# Patient Record
Sex: MALE | Race: WHITE | Employment: FULL TIME | URBAN - METROPOLITAN AREA
[De-identification: names, ages, dates, MRNs, and addresses within clinical notes are randomized per-mention and may not be internally consistent; named-entity substitution may affect disease eponyms.]

---

## 2018-12-17 ENCOUNTER — OFFICE VISIT (OUTPATIENT)
Dept: FAMILY MEDICINE | Facility: CLINIC | Age: 28
End: 2018-12-17
Payer: COMMERCIAL

## 2018-12-17 VITALS
OXYGEN SATURATION: 96 % | WEIGHT: 283 LBS | DIASTOLIC BLOOD PRESSURE: 88 MMHG | HEART RATE: 65 BPM | RESPIRATION RATE: 16 BRPM | BODY MASS INDEX: 34.46 KG/M2 | TEMPERATURE: 97.6 F | HEIGHT: 76 IN | SYSTOLIC BLOOD PRESSURE: 158 MMHG

## 2018-12-17 DIAGNOSIS — J40 COMPLICATED BRONCHITIS: Primary | ICD-10-CM

## 2018-12-17 PROCEDURE — 99204 OFFICE O/P NEW MOD 45 MIN: CPT | Performed by: PHYSICIAN ASSISTANT

## 2018-12-17 RX ORDER — AZITHROMYCIN 250 MG/1
TABLET, FILM COATED ORAL
Qty: 6 TABLET | Refills: 0 | Status: SHIPPED | OUTPATIENT
Start: 2018-12-17 | End: 2020-01-13

## 2018-12-17 RX ORDER — PREDNISONE 20 MG/1
20 TABLET ORAL DAILY
Qty: 5 TABLET | Refills: 0 | Status: SHIPPED | OUTPATIENT
Start: 2018-12-17 | End: 2018-12-22

## 2018-12-17 RX ORDER — ALBUTEROL SULFATE 90 UG/1
AEROSOL, METERED RESPIRATORY (INHALATION)
Qty: 1 INHALER | Refills: 0 | Status: SHIPPED | OUTPATIENT
Start: 2018-12-17 | End: 2020-01-13

## 2018-12-17 ASSESSMENT — ENCOUNTER SYMPTOMS
VOMITING: 0
HEADACHES: 0
COUGH: 1
NAUSEA: 0
SORE THROAT: 0
PALPITATIONS: 0
ABDOMINAL PAIN: 0
MYALGIAS: 0
SHORTNESS OF BREATH: 0
EYE DISCHARGE: 0
PSYCHIATRIC NEGATIVE: 1
NEUROLOGICAL NEGATIVE: 1
WHEEZING: 0
DIARRHEA: 0
CHILLS: 0
EYE REDNESS: 0
BLURRED VISION: 0
FEVER: 0

## 2018-12-17 ASSESSMENT — MIFFLIN-ST. JEOR: SCORE: 2360.18

## 2018-12-17 NOTE — PROGRESS NOTES
SUBJECTIVE:   Rogelio Noel is a 28 year old male who presents to clinic today for the following health issues:      Cough       Duration: 1 month     Description (location/character/radiation): Cough     Intensity:  Mild to Moderate     Accompanying signs and symptoms: for 3 weeks the cough was worse at night, no runny nose or congestion, the cough felt deep like it was coming from his chest, no sob or wheezing.     History (similar episodes/previous evaluation): None    Precipitating or alleviating factors: None    Therapies tried and outcome: None     Cough worse in the past week. Dad was recently diagnosed with walking pneumonia and he lives in the same house.dad was treated with antibiotic and inhaler. No history of asthma or reactive airways.     Patient is generally healthy with no significant past medical history, surgical history, or family history.      Problem list and histories reviewed & adjusted, as indicated.  Additional history: as documented    There is no problem list on file for this patient.    History reviewed. No pertinent surgical history.    Social History     Tobacco Use     Smoking status: Never Smoker     Smokeless tobacco: Never Used   Substance Use Topics     Alcohol use: Yes     Comment: Beer once or twice a month      Family History   Problem Relation Age of Onset     Cancer Mother      Diabetes Mother      Hypertension Mother      Diabetes Father      Hypertension Father      Hypertension Brother          Current Outpatient Medications   Medication Sig Dispense Refill     albuterol (PROAIR HFA/PROVENTIL HFA/VENTOLIN HFA) 108 (90 Base) MCG/ACT inhaler Inhale 2 puffs every 4-6 hours as needed for cough, wheezing, or shortness of breath 1 Inhaler 0     azithromycin (ZITHROMAX) 250 MG tablet Two tablets first day, then one tablet daily for four days. 6 tablet 0     predniSONE (DELTASONE) 20 MG tablet Take 20 mg by mouth daily for 5 days. 5 tablet 0     No Known Allergies  Labs reviewed  "in EPIC    Reviewed and updated as needed this visit by clinical staff  Tobacco  Allergies  Meds  Problems  Med Hx  Surg Hx  Fam Hx  Soc Hx        Reviewed and updated as needed this visit by Provider  Tobacco  Allergies  Meds  Problems  Med Hx  Surg Hx  Fam Hx         ROS:  Review of Systems   Constitutional: Negative for chills, fever and malaise/fatigue.   HENT: Negative for congestion, ear pain and sore throat.    Eyes: Negative for blurred vision, discharge and redness.   Respiratory: Positive for cough. Negative for shortness of breath and wheezing.    Cardiovascular: Negative for chest pain and palpitations.   Gastrointestinal: Negative for abdominal pain, diarrhea, nausea and vomiting.   Genitourinary: Negative.    Musculoskeletal: Negative for joint pain and myalgias.   Skin: Negative for rash.   Neurological: Negative.  Negative for headaches.   Endo/Heme/Allergies: Negative.    Psychiatric/Behavioral: Negative.          OBJECTIVE:     /88   Pulse 65   Temp 97.6  F (36.4  C) (Tympanic)   Resp 16   Ht 1.93 m (6' 4\")   Wt 128.4 kg (283 lb)   SpO2 96%   BMI 34.45 kg/m    Body mass index is 34.45 kg/m .    Physical Exam   Constitutional: He appears well-developed and well-nourished. No distress.   HENT:   Head: Normocephalic and atraumatic.   Right Ear: Tympanic membrane and ear canal normal.   Left Ear: Tympanic membrane and ear canal normal.   Mouth/Throat: Oropharynx is clear and moist.   Eyes: Conjunctivae are normal. Pupils are equal, round, and reactive to light.   Neck: Neck supple.   Cardiovascular: Normal rate and regular rhythm.   Pulmonary/Chest: Effort normal and breath sounds normal.   Frequent dry reactive cough   Skin: Skin is warm and dry. No rash noted.   Psychiatric: He has a normal mood and affect. His behavior is normal.       Diagnostic Test Results:  none     ASSESSMENT/PLAN:     1. Complicated bronchitis  Given worsening cough and exposure to walking pneumonia, " will treat with azithromycin 500mg once daily x 1 day, then 250mg once daily x 4 days. Also prescribed prednisone 20mg once daily x 5 days and albuterol 2 puffs every 4-6hrs as needed. Discussed how to take/use these medications and what to expect. Get plenty of rest and push fluids. Can use Tylenol and/or ibuprofen as needed for pain and/or fever control. Return to clinic if symptoms worsen or do not improve; otherwise follow up as needed      - azithromycin (ZITHROMAX) 250 MG tablet; Two tablets first day, then one tablet daily for four days.  Dispense: 6 tablet; Refill: 0  - albuterol (PROAIR HFA/PROVENTIL HFA/VENTOLIN HFA) 108 (90 Base) MCG/ACT inhaler; Inhale 2 puffs every 4-6 hours as needed for cough, wheezing, or shortness of breath  Dispense: 1 Inhaler; Refill: 0  - predniSONE (DELTASONE) 20 MG tablet; Take 20 mg by mouth daily for 5 days.  Dispense: 5 tablet; Refill: 0     Kiersten Chavez PA-C  [facility]

## 2018-12-17 NOTE — LETTER
[facility]  [address] [place] [address]  Phone: [phone]  Fax: [phone]    December 17, 2018        Rogelio Noel  [address] [place] [address]          To whom it may concern:    RE: Rogelio Noel    Patient was seen and treated today at our clinic and missed work 12/18/18 and 12/19/18    Please contact me for questions or concerns.      Sincerely,        Kiersten Chavez PA-C

## 2019-01-14 ENCOUNTER — OFFICE VISIT (OUTPATIENT)
Dept: FAMILY MEDICINE | Facility: CLINIC | Age: 29
End: 2019-01-14
Payer: COMMERCIAL

## 2019-01-14 ENCOUNTER — ANCILLARY PROCEDURE (OUTPATIENT)
Dept: GENERAL RADIOLOGY | Facility: CLINIC | Age: 29
End: 2019-01-14
Payer: COMMERCIAL

## 2019-01-14 VITALS
SYSTOLIC BLOOD PRESSURE: 152 MMHG | RESPIRATION RATE: 14 BRPM | DIASTOLIC BLOOD PRESSURE: 80 MMHG | HEART RATE: 79 BPM | OXYGEN SATURATION: 98 % | HEIGHT: 75 IN | BODY MASS INDEX: 34.74 KG/M2 | TEMPERATURE: 96.9 F | WEIGHT: 279.4 LBS

## 2019-01-14 DIAGNOSIS — I10 BENIGN ESSENTIAL HYPERTENSION: ICD-10-CM

## 2019-01-14 DIAGNOSIS — R05.9 COUGH: ICD-10-CM

## 2019-01-14 DIAGNOSIS — R05.9 COUGH: Primary | ICD-10-CM

## 2019-01-14 PROCEDURE — 71046 X-RAY EXAM CHEST 2 VIEWS: CPT

## 2019-01-14 PROCEDURE — 99214 OFFICE O/P EST MOD 30 MIN: CPT | Performed by: PHYSICIAN ASSISTANT

## 2019-01-14 RX ORDER — ALBUTEROL SULFATE 90 UG/1
AEROSOL, METERED RESPIRATORY (INHALATION)
Qty: 1 INHALER | Refills: 0 | Status: SHIPPED | OUTPATIENT
Start: 2019-01-14 | End: 2020-01-13

## 2019-01-14 RX ORDER — PREDNISONE 20 MG/1
20 TABLET ORAL DAILY
Qty: 7 TABLET | Refills: 0 | Status: SHIPPED | OUTPATIENT
Start: 2019-01-14 | End: 2019-01-21

## 2019-01-14 ASSESSMENT — ENCOUNTER SYMPTOMS
EYE REDNESS: 0
VOMITING: 0
MYALGIAS: 0
EYE DISCHARGE: 0
DIARRHEA: 0
SORE THROAT: 0
HEADACHES: 0
BLURRED VISION: 0
SHORTNESS OF BREATH: 0
CHILLS: 0
NAUSEA: 0
PALPITATIONS: 0
FEVER: 0
ABDOMINAL PAIN: 0

## 2019-01-14 ASSESSMENT — MIFFLIN-ST. JEOR: SCORE: 2320.04

## 2019-01-14 NOTE — LETTER
[facility]  [address] [place] [address]  Phone: [phone]  Fax: [phone]    January 14, 2019        Rogelio Noel  [address] [place] [address]          To whom it may concern:    RE: Rogelio Noel    Patient was seen and treated today at our clinic and missed work 01/14/19    Please contact me for questions or concerns.      Sincerely,        LORA SAME DAY PROVIDER

## 2019-01-14 NOTE — PROGRESS NOTES
SUBJECTIVE:   Rogelio Noel is a 28 year old male who presents to clinic today for the following health issues:      Cough       Duration: over a month- started again about 1.5 weeks ago     Description (location/character/radiation): cough     Intensity:  severe    Accompanying signs and symptoms: shortness of breath, struggling to breathe, runny nose, congestion, sore throat, cough is productive with yellow sputum, loss of sleep, fatigue, loss of energy, felt like throat was closing last night.      History (similar episodes/previous evaluation): exposed to father with illness     Precipitating or alleviating factors: None    Therapies tried and outcome: zpak- taken 12/17/18 and it helped but sx's are getting worse again, prednisone, did not take the inhaler. The prednisone and zpak did help the first time.      Patient reports history of hypertension. He was started on blood pressure medication for several years and then came off of the medication. He does not check blood pressure at home.    Problem list and histories reviewed & adjusted, as indicated.  Additional history: as documented    There is no problem list on file for this patient.    History reviewed. No pertinent surgical history.    Social History     Tobacco Use     Smoking status: Never Smoker     Smokeless tobacco: Never Used   Substance Use Topics     Alcohol use: Yes     Comment: Beer once or twice a month      Family History   Problem Relation Age of Onset     Cancer Mother      Diabetes Mother      Hypertension Mother      Diabetes Father      Hypertension Father      Hypertension Brother          Current Outpatient Medications   Medication Sig Dispense Refill     albuterol (PROAIR HFA/PROVENTIL HFA/VENTOLIN HFA) 108 (90 Base) MCG/ACT inhaler Inhale 2 puffs every 4-6 hours as needed for cough, wheezing, or shortness of breath 1 Inhaler 0     predniSONE (DELTASONE) 20 MG tablet Take 20 mg by mouth daily for 7 days. 7 tablet 0     albuterol  "(PROAIR HFA/PROVENTIL HFA/VENTOLIN HFA) 108 (90 Base) MCG/ACT inhaler Inhale 2 puffs every 4-6 hours as needed for cough, wheezing, or shortness of breath (Patient not taking: Reported on 1/14/2019) 1 Inhaler 0     azithromycin (ZITHROMAX) 250 MG tablet Two tablets first day, then one tablet daily for four days. (Patient not taking: Reported on 1/14/2019) 6 tablet 0     No Known Allergies  Labs reviewed in EPIC    Reviewed and updated as needed this visit by clinical staff  Tobacco  Allergies  Meds  Problems  Med Hx  Surg Hx  Fam Hx  Soc Hx        Reviewed and updated as needed this visit by Provider  Tobacco  Allergies  Meds  Problems  Med Hx  Surg Hx  Fam Hx         ROS:  Review of Systems   Constitutional: Negative for chills, fever and malaise/fatigue.   HENT: Negative for congestion, ear pain and sore throat.    Eyes: Negative for blurred vision, discharge and redness.   Respiratory: Positive for cough and wheezing. Negative for shortness of breath.    Cardiovascular: Negative for chest pain and palpitations.   Gastrointestinal: Negative for abdominal pain, diarrhea, nausea and vomiting.   Musculoskeletal: Negative for joint pain and myalgias.   Skin: Negative for rash.   Neurological: Negative for headaches.         OBJECTIVE:     /80   Pulse 79   Temp 96.9  F (36.1  C) (Tympanic)   Resp 14   Ht 1.892 m (6' 2.5\")   Wt 126.7 kg (279 lb 6.4 oz)   SpO2 98%   BMI 35.39 kg/m    Body mass index is 35.39 kg/m .    Physical Exam   Constitutional: He appears well-developed and well-nourished. No distress.   HENT:   Head: Normocephalic and atraumatic.   Right Ear: Tympanic membrane and ear canal normal.   Left Ear: Tympanic membrane and ear canal normal.   Mouth/Throat: Oropharynx is clear and moist.   Eyes: Conjunctivae are normal. Pupils are equal, round, and reactive to light.   Cardiovascular: Normal rate and regular rhythm.   Pulmonary/Chest: Effort normal and breath sounds normal. "   Skin: Skin is warm and dry. No rash noted.   Psychiatric: He has a normal mood and affect. His behavior is normal.       Diagnostic Test Results:  CXR - unremarkable    ASSESSMENT/PLAN:       1. Cough  Reassurance, chest x-ray is clear. Likely postviral/reactive cough. Will try another course of prednisone 20mg x 7 days and albuterol 2 puffs every 4-6 hrs as needed. Return to clinic if symptoms worsen or do not improve; otherwise follow up as needed      - XR Chest 2 Views; Future  - predniSONE (DELTASONE) 20 MG tablet; Take 20 mg by mouth daily for 7 days.  Dispense: 7 tablet; Refill: 0  - albuterol (PROAIR HFA/PROVENTIL HFA/VENTOLIN HFA) 108 (90 Base) MCG/ACT inhaler; Inhale 2 puffs every 4-6 hours as needed for cough, wheezing, or shortness of breath  Dispense: 1 Inhaler; Refill: 0    2. Benign essential hypertension  Would recommend he log blood pressure once daily at home and follow up with primary care provider in 2 weeks with readings. Aim for <2,000mg of sodium/day. He agrees with plan.    EMMY العراقي SAME DAY PROVIDER  [facility]

## 2019-01-16 ASSESSMENT — ENCOUNTER SYMPTOMS
WHEEZING: 1
COUGH: 1

## 2020-01-13 ENCOUNTER — OFFICE VISIT (OUTPATIENT)
Dept: FAMILY MEDICINE | Facility: CLINIC | Age: 30
End: 2020-01-13
Payer: COMMERCIAL

## 2020-01-13 VITALS
OXYGEN SATURATION: 97 % | TEMPERATURE: 96.6 F | HEART RATE: 81 BPM | BODY MASS INDEX: 33.51 KG/M2 | SYSTOLIC BLOOD PRESSURE: 162 MMHG | HEIGHT: 76 IN | WEIGHT: 275.2 LBS | DIASTOLIC BLOOD PRESSURE: 98 MMHG

## 2020-01-13 DIAGNOSIS — I10 BENIGN ESSENTIAL HYPERTENSION: ICD-10-CM

## 2020-01-13 DIAGNOSIS — Z23 NEED FOR PROPHYLACTIC VACCINATION AND INOCULATION AGAINST INFLUENZA: ICD-10-CM

## 2020-01-13 DIAGNOSIS — J20.9 ACUTE BRONCHITIS WITH SYMPTOMS > 10 DAYS: Primary | ICD-10-CM

## 2020-01-13 PROCEDURE — 90471 IMMUNIZATION ADMIN: CPT | Performed by: NURSE PRACTITIONER

## 2020-01-13 PROCEDURE — 99214 OFFICE O/P EST MOD 30 MIN: CPT | Mod: 25 | Performed by: NURSE PRACTITIONER

## 2020-01-13 PROCEDURE — 90686 IIV4 VACC NO PRSV 0.5 ML IM: CPT | Performed by: NURSE PRACTITIONER

## 2020-01-13 RX ORDER — AZITHROMYCIN 250 MG/1
TABLET, FILM COATED ORAL
Qty: 6 TABLET | Refills: 0 | Status: SHIPPED | OUTPATIENT
Start: 2020-01-13 | End: 2020-02-11

## 2020-01-13 ASSESSMENT — MIFFLIN-ST. JEOR: SCORE: 2314.55

## 2020-01-13 NOTE — PROGRESS NOTES
"Subjective     Rogelio Noel is a 29 year old male who presents to clinic today for the following health issues:    HPI   ENT Symptoms             Symptoms: cc Present Absent Comment   Fever/Chills  X   One week ago   Fatigue   X     Muscle Aches   X     Eye Irritation   X     Sneezing   X     Nasal Blaze/Drg  X      Sinus Pressure/Pain   X     Loss of smell   X     Dental pain   X     Sore Throat  X      Swollen Glands   X     Ear Pain/Fullness   X     Cough X    Dry    Wheeze   X     Chest Pain   X     Shortness of breath   X     Rash   X     Other         Symptom duration:  1.5 weeks   Symptom severity:  cough is severe; worse when laying flat and after exercise.    Treatments tried:  cough drops, nyquil, dayquil, ice cream   Contacts:  exposed to walking pneumonia, non-smoker, no history of asthma.     Initially had fever 99     BP Readings from Last 6 Encounters:   01/13/20 (!) 162/98   01/14/19 152/80   12/17/18 158/88               There is no problem list on file for this patient.    No past surgical history on file.    Social History     Tobacco Use     Smoking status: Never Smoker     Smokeless tobacco: Never Used   Substance Use Topics     Alcohol use: Yes     Comment: Beer once or twice a month      Family History   Problem Relation Age of Onset     Cancer Mother      Diabetes Mother      Hypertension Mother      Diabetes Father      Hypertension Father      Hypertension Brother            Reviewed and updated as needed this visit by Provider         Review of Systems   ROS COMP: Constitutional, HEENT, cardiovascular, pulmonary, gi and gu systems are negative, except as otherwise noted.      Objective    BP (!) 162/98 (BP Location: Right arm, Patient Position: Sitting, Cuff Size: Adult Large)   Pulse 81   Temp 96.6  F (35.9  C) (Tympanic)   Ht 1.922 m (6' 3.67\")   Wt 124.8 kg (275 lb 3.2 oz)   SpO2 97%   BMI 33.79 kg/m    Body mass index is 33.79 kg/m .  Physical Exam   GENERAL: healthy, alert and " "no distress  NECK: no adenopathy, no asymmetry, masses, or scars and thyroid normal to palpation  RESP: lungs clear to auscultation - no rales, rhonchi or wheezes  CV: regular rate and rhythm, normal S1 S2, no S3 or S4, no murmur, click or rub, no peripheral edema and peripheral pulses strong  ABDOMEN: soft, nontender, no hepatosplenomegaly, no masses and bowel sounds normal  MS: no gross musculoskeletal defects noted, no edema    Diagnostic Test Results:  Labs reviewed in Epic  none         Assessment & Plan     1. Acute bronchitis with symptoms > 10 days  Not hypoxic   Exam findinh unremarkable   Will treat with a z pack   Advised to call if not improved would get CXR give hx of pneumonia on Friday and consider prednisone     - azithromycin (ZITHROMAX) 250 MG tablet; Two tablets first day, then one tablet daily for four days.  Dispense: 6 tablet; Refill: 0    2. Benign essential hypertension  High today but ill   Discussed the past 3 readings have been high   He has been scheduled for a follow up appointment with me to recheck in 2 weeks and will likely start medication then   He lives a active lifestyle, yoga daily, skates in a men's league twice a week   Recommend low sodium diet   Reports significant family history     3. Need for prophylactic vaccination and inoculation against influenza  - INFLUENZA VACCINE IM > 6 MONTHS VALENT IIV4 [39539]     BMI:   Estimated body mass index is 33.79 kg/m  as calculated from the following:    Height as of this encounter: 1.922 m (6' 3.67\").    Weight as of this encounter: 124.8 kg (275 lb 3.2 oz).   Weight management plan: Discussed healthy diet and exercise guidelines        Patient Instructions     Will treat with a Z Pack for bronchitis   If not improved on Friday call and will order a chest xray (may consider steroid)       Patient Education     Acute Bronchitis  Your healthcare provider has told you that you have acute bronchitis. Bronchitis is infection or " inflammation of the bronchial tubes (airways in the lungs). Normally, air moves easily in and out of the airways. Bronchitis narrows the airways, making it harder for air to flow in and out of the lungs. This causes symptoms such as shortness of breath, coughing up yellow or green mucus, and wheezing. Bronchitis can be acute or chronic. Acute means the condition comes on quickly and goes away in a short time, usually within 3 to 10 days. Chronic means a condition lasts a long time and often comes back.    What causes acute bronchitis?  Acute bronchitis almost always starts as a viral respiratory infection, such as a cold or the flu. Certain factors make it more likely for a cold or flu to turn into bronchitis. These include being very young, being elderly, having a heart or lung problem, or having a weak immune system. Cigarette smoking also makes bronchitis more likely.  When bronchitis develops, the airways become swollen. The airways may also become infected with bacteria. This is known as a secondary infection.  Diagnosing acute bronchitis  Your healthcare provider will examine you and ask about your symptoms and health history. You may also have a sputum culture to test the fluid in your lungs. Chest X-rays may be done to look for infection in the lungs.  Treating acute bronchitis  Bronchitis usually clears up as the cold or flu goes away. You can help feel better faster by doing the following:    Take medicine as directed. You may be told to take ibuprofen or other over-the-counter medicines. These help relieve inflammation in your bronchial tubes. Your healthcare provider may prescribe an inhaler to help open up the bronchial tubes. Most of the time, acute bronchitis is caused by a viral infection. Antibiotics are usually not prescribed for viral infections.    Drink plenty of fluids, such as water, juice, or warm soup. Fluids loosen mucus so that you can cough it up. This helps you breathe more easily.  Fluids also prevent dehydration.    Make sure you get plenty of rest.    Do not smoke. Do not allow anyone else to smoke in your home.  Recovery and follow-up  Follow up with your doctor as you are told. You will likely feel better in a week or two. But a dry cough can linger beyond that time. Let your doctor know if you still have symptoms (other than a dry cough) after 2 weeks, or if you re prone to getting bronchial infections. Take steps to protect yourself from future infections. These steps include stopping smoking and avoiding tobacco smoke, washing your hands often, and getting a yearly flu shot.  When to call your healthcare provider  Call the healthcare provider if you have any of the following:    Fever of 100.4 F (38.0 C) or higher, or as advised    Symptoms that get worse, or new symptoms    Trouble breathing    Symptoms that don t start to improve within a week, or within 3 days of taking antibiotics   Date Last Reviewed: 12/1/2016 2000-2019 [employer]. [address] [place] [address], [place] [id number]. All rights reserved. This information is not intended as a substitute for professional medical care. Always follow your healthcare professional's instructions.               Return in about 3 weeks (around 2/3/2020) for BP Recheck, Routine Visit.    Susy Kapoor NP  [facility]

## 2020-01-13 NOTE — PATIENT INSTRUCTIONS
Will treat with a Z Pack for bronchitis   If not improved on Friday call and will order a chest xray (may consider steroid)       Patient Education     Acute Bronchitis  Your healthcare provider has told you that you have acute bronchitis. Bronchitis is infection or inflammation of the bronchial tubes (airways in the lungs). Normally, air moves easily in and out of the airways. Bronchitis narrows the airways, making it harder for air to flow in and out of the lungs. This causes symptoms such as shortness of breath, coughing up yellow or green mucus, and wheezing. Bronchitis can be acute or chronic. Acute means the condition comes on quickly and goes away in a short time, usually within 3 to 10 days. Chronic means a condition lasts a long time and often comes back.    What causes acute bronchitis?  Acute bronchitis almost always starts as a viral respiratory infection, such as a cold or the flu. Certain factors make it more likely for a cold or flu to turn into bronchitis. These include being very young, being elderly, having a heart or lung problem, or having a weak immune system. Cigarette smoking also makes bronchitis more likely.  When bronchitis develops, the airways become swollen. The airways may also become infected with bacteria. This is known as a secondary infection.  Diagnosing acute bronchitis  Your healthcare provider will examine you and ask about your symptoms and health history. You may also have a sputum culture to test the fluid in your lungs. Chest X-rays may be done to look for infection in the lungs.  Treating acute bronchitis  Bronchitis usually clears up as the cold or flu goes away. You can help feel better faster by doing the following:    Take medicine as directed. You may be told to take ibuprofen or other over-the-counter medicines. These help relieve inflammation in your bronchial tubes. Your healthcare provider may prescribe an inhaler to help open up the bronchial tubes. Most of the  time, acute bronchitis is caused by a viral infection. Antibiotics are usually not prescribed for viral infections.    Drink plenty of fluids, such as water, juice, or warm soup. Fluids loosen mucus so that you can cough it up. This helps you breathe more easily. Fluids also prevent dehydration.    Make sure you get plenty of rest.    Do not smoke. Do not allow anyone else to smoke in your home.  Recovery and follow-up  Follow up with your doctor as you are told. You will likely feel better in a week or two. But a dry cough can linger beyond that time. Let your doctor know if you still have symptoms (other than a dry cough) after 2 weeks, or if you re prone to getting bronchial infections. Take steps to protect yourself from future infections. These steps include stopping smoking and avoiding tobacco smoke, washing your hands often, and getting a yearly flu shot.  When to call your healthcare provider  Call the healthcare provider if you have any of the following:    Fever of 100.4 F (38.0 C) or higher, or as advised    Symptoms that get worse, or new symptoms    Trouble breathing    Symptoms that don t start to improve within a week, or within 3 days of taking antibiotics   Date Last Reviewed: 12/1/2016 2000-2019 [employer]. [address] [place] [address], [place] [address]. All rights reserved. This information is not intended as a substitute for professional medical care. Always follow your healthcare professional's instructions.

## 2020-02-11 ENCOUNTER — OFFICE VISIT (OUTPATIENT)
Dept: FAMILY MEDICINE | Facility: CLINIC | Age: 30
End: 2020-02-11
Payer: COMMERCIAL

## 2020-02-11 VITALS
DIASTOLIC BLOOD PRESSURE: 90 MMHG | OXYGEN SATURATION: 98 % | HEIGHT: 76 IN | WEIGHT: 275 LBS | TEMPERATURE: 98.7 F | BODY MASS INDEX: 33.49 KG/M2 | HEART RATE: 83 BPM | SYSTOLIC BLOOD PRESSURE: 170 MMHG | RESPIRATION RATE: 16 BRPM

## 2020-02-11 DIAGNOSIS — E78.2 MIXED HYPERLIPIDEMIA: ICD-10-CM

## 2020-02-11 DIAGNOSIS — F43.21 GRIEF: ICD-10-CM

## 2020-02-11 DIAGNOSIS — I10 ESSENTIAL HYPERTENSION: ICD-10-CM

## 2020-02-11 DIAGNOSIS — R07.89 ATYPICAL CHEST PAIN: Primary | ICD-10-CM

## 2020-02-11 LAB
ANION GAP SERPL CALCULATED.3IONS-SCNC: 3 MMOL/L (ref 3–14)
BUN SERPL-MCNC: 24 MG/DL (ref 7–30)
CALCIUM SERPL-MCNC: 8.9 MG/DL (ref 8.5–10.1)
CHLORIDE SERPL-SCNC: 108 MMOL/L (ref 94–109)
CHOLEST SERPL-MCNC: 210 MG/DL
CO2 SERPL-SCNC: 28 MMOL/L (ref 20–32)
CREAT SERPL-MCNC: 1.06 MG/DL (ref 0.66–1.25)
GFR SERPL CREATININE-BSD FRML MDRD: >90 ML/MIN/{1.73_M2}
GLUCOSE SERPL-MCNC: 96 MG/DL (ref 70–99)
HDLC SERPL-MCNC: 48 MG/DL
LDLC SERPL CALC-MCNC: 116 MG/DL
NONHDLC SERPL-MCNC: 162 MG/DL
POTASSIUM SERPL-SCNC: 4.2 MMOL/L (ref 3.4–5.3)
SODIUM SERPL-SCNC: 139 MMOL/L (ref 133–144)
TRIGL SERPL-MCNC: 231 MG/DL
TSH SERPL DL<=0.005 MIU/L-ACNC: 1.24 MU/L (ref 0.4–4)

## 2020-02-11 PROCEDURE — 36415 COLL VENOUS BLD VENIPUNCTURE: CPT | Performed by: NURSE PRACTITIONER

## 2020-02-11 PROCEDURE — 84443 ASSAY THYROID STIM HORMONE: CPT | Performed by: NURSE PRACTITIONER

## 2020-02-11 PROCEDURE — 80048 BASIC METABOLIC PNL TOTAL CA: CPT | Performed by: NURSE PRACTITIONER

## 2020-02-11 PROCEDURE — 99214 OFFICE O/P EST MOD 30 MIN: CPT | Performed by: NURSE PRACTITIONER

## 2020-02-11 PROCEDURE — 80061 LIPID PANEL: CPT | Performed by: NURSE PRACTITIONER

## 2020-02-11 PROCEDURE — 93000 ELECTROCARDIOGRAM COMPLETE: CPT | Performed by: NURSE PRACTITIONER

## 2020-02-11 RX ORDER — LISINOPRIL 10 MG/1
10 TABLET ORAL DAILY
Qty: 30 TABLET | Refills: 0 | Status: SHIPPED | OUTPATIENT
Start: 2020-02-11 | End: 2020-03-09

## 2020-02-11 ASSESSMENT — MIFFLIN-ST. JEOR: SCORE: 2308.65

## 2020-02-11 NOTE — LETTER
February 14, 2020      Rogelio Noel  [address] [place] [address]        Dear ,    We are writing to inform you of your test results.    His labs were normal except cholesterol levels were mildly elevated. Recommend low fat diet and exercise to keep these from worsening.       Resulted Orders   Basic metabolic panel   Result Value Ref Range    Sodium 139 133 - 144 mmol/L    Potassium 4.2 3.4 - 5.3 mmol/L    Chloride 108 94 - 109 mmol/L    Carbon Dioxide 28 20 - 32 mmol/L    Anion Gap 3 3 - 14 mmol/L    Glucose 96 70 - 99 mg/dL      Comment:      Non Fasting    Urea Nitrogen 24 7 - 30 mg/dL    Creatinine 1.06 0.66 - 1.25 mg/dL    GFR Estimate >90 >60 mL/min/[1.73_m2]      Comment:      Non  GFR Calc  Starting 12/18/2018, serum creatinine based estimated GFR (eGFR) will be   calculated using the Chronic Kidney Disease Epidemiology Collaboration   (CKD-EPI) equation.      GFR Estimate If Black >90 >60 mL/min/[1.73_m2]      Comment:       GFR Calc  Starting 12/18/2018, serum creatinine based estimated GFR (eGFR) will be   calculated using the Chronic Kidney Disease Epidemiology Collaboration   (CKD-EPI) equation.      Calcium 8.9 8.5 - 10.1 mg/dL   TSH with free T4 reflex   Result Value Ref Range    TSH 1.24 0.40 - 4.00 mU/L   Lipid Profile   Result Value Ref Range    Cholesterol 210 (H) <200 mg/dL      Comment:      Desirable:       <200 mg/dl    Triglycerides 231 (H) <150 mg/dL      Comment:      Borderline high:  150-199 mg/dl  High:             200-499 mg/dl  Very high:       >499 mg/dl  Non Fasting      HDL Cholesterol 48 >39 mg/dL    LDL Cholesterol Calculated 116 (H) <100 mg/dL      Comment:      Above desirable:  100-129 mg/dl  Borderline High:  130-159 mg/dL  High:             160-189 mg/dL  Very high:       >189 mg/dl      Non HDL Cholesterol 162 (H) <130 mg/dL      Comment:      Above Desirable:  130-159 mg/dl  Borderline high:  160-189 mg/dl  High:              190-219 mg/dl  Very high:       >219 mg/dl         If you have any questions or concerns, please call the clinic at the number listed above.       Sincerely,        Susy Kapoor NP/[facility]

## 2020-02-11 NOTE — PATIENT INSTRUCTIONS
Will start lisinopril 10 mg daily for blood pressure  Low sodium diet       Recheck with nurse in 2 weeks     Consider counseling    Patient Education     Established High Blood Pressure    High blood pressure (hypertension) is a chronic disease. Often, healthcare providers don t know what causes it. But it can be caused by certain health conditions and medicines.  If you have high blood pressure, you may not have any symptoms. If you do have symptoms, they may include headache, dizziness, changes in your vision, chest pain, and shortness of breath. But even without symptoms, high blood pressure that s not treated raises your risk for heart attack, heart failure, and stroke. High blood pressure is a serious health risk and shouldn t be ignored.  Blood pressure measurements are given as 2 numbers. Systolic blood pressure is the upper number. This is the pressure when the heart contracts. Diastolic blood pressure is the lower number. This is the pressure when the heart relaxes between beats. You will see your blood pressure readings written together. For example, a person with a systolic pressure of 118 and a diastolic pressure of 78 will have 118/78 written in the medical record.  Blood pressure is categorized as normal, elevated, or stage 1 or stage 2 high blood pressure:    Normal blood pressure is systolic of less than 120 and diastolic of less than 80 (120/80)    Elevated blood pressure is systolic of 120 to 129 and diastolic less than 80    Stage 1 high blood pressure is systolic is 130 to 139 or diastolic between 80 to 89    Stage 2 high blood pressure is when systolic is 140 or higher or the diastolic is 90 or higher  Home care  If you have high blood pressure, follow these home care guidelines to help lower your blood pressure. If you are taking medicines for high blood pressure, these methods may reduce or end your need for medicines in the future.    Start a weight-loss program if you are overweight.    Cut  back on how much salt you get in your diet. Here s how to do this:  ? Don t eat foods that have a lot of salt. These include olives, pickles, smoked meats, and salted potato chips.  ? Don t add salt to your food at the table.  ? Use only small amounts of salt when cooking.    Start an exercise program. Talk with your healthcare provider about the type of exercise program that would be best for you. It doesn't have to be hard. Even brisk walking for 20 minutes 3 times a week is a good form of exercise.    Don t take medicines that stimulate the heart. This includes many over-the-counter cold and sinus decongestant pills and sprays, as well as diet pills. Check the warnings about high blood pressure on the label. Before buying any over-the-counter medicines or supplements, always ask the pharmacist about the product's potential interaction with your high blood pressure and your high blood pressure medicines.    Stimulants such as amphetamine or cocaine could be deadly for someone with high blood pressure. Never take these.    Limit how much caffeine you get in your diet. Switch to caffeine-free products.    Stop smoking. If you are a long-time smoker, this can be hard. Talk to your healthcare provider about medicines and nicotine replacement options to help you. Also, enroll in a stop-smoking program to make it more likely that you will quit for good.    Learn how to handle stress. This is an important part of any program to lower blood pressure. Learn about relaxation methods like meditation, yoga, or biofeedback.    If your provider prescribed medicines, take them exactly as directed. Missing doses may cause your blood pressure get out of control.    If you miss a dose or doses, check with your healthcare provider or pharmacist about what to do.    Consider buying an automatic blood pressure machine to check your blood pressure at home. Ask your provider for a recommendation. You can get one of these at most  pharmacies.     The American Heart Association recommends the following guidelines for home blood pressure monitoring:    Don't smoke or drink coffee for 30 minutes before taking your blood pressure.    Go to the bathroom before the test.    Relax for 5 minutes before taking the measurement.    Sit with your back supported (don't sit on a couch or soft chair); keep your feet on the floor uncrossed. Place your arm on a solid flat surface (like a table) with the upper part of the arm at heart level. Place the middle of the cuff directly above the bend of the elbow. Check the monitor's instruction manual for an illustration.    Take multiple readings. When you measure, take 2 to 3 readings one minute apart and record all of the results.    Take your blood pressure at the same time every day, or as your healthcare provider recommends.    Record the date, time, and blood pressure reading.    Take the record with you to your next medical appointment. If your blood pressure monitor has a built-in memory, simply take the monitor with you to your next appointment.    Call your provider if you have several high readings. Don't be frightened by a single high blood pressure reading, but if you get several high readings, check in with your healthcare provider.    Note: When blood pressure reaches a systolic (top number) of 180 or higher OR diastolic (bottom number) of 110 or higher, seek emergency medical treatment.  Follow-up care  You will need to see your healthcare provider regularly. This is to check your blood pressure and to make changes to your medicines. Make a follow-up appointment as directed. Bring the record of your home blood pressure readings to the appointment.  When to seek medical advice  Call your healthcare provider right away if any of these occur:    Blood pressure reaches a systolic (upper number) of 180 or higher OR a diastolic (bottom number) of 110 or higher    Chest pain or shortness of  breath    Severe headache    Throbbing or rushing sound in the ears    Nosebleed    Sudden severe pain in your belly (abdomen)    Extreme drowsiness, confusion, or fainting    Dizziness or spinning sensation (vertigo)    Weakness of an arm or leg or one side of the face    You have problems speaking or seeing   Date Last Reviewed: 12/1/2016 2000-2019 [employer]. [address] [place] [address]. All rights reserved. This information is not intended as a substitute for professional medical care. Always follow your healthcare professional's instructions.

## 2020-02-11 NOTE — PROGRESS NOTES
"SUBJECTIVE   Rogelio Noel is a 30 year old male who presents with     Hypertension Follow-up      Do you check your blood pressure regularly outside of the clinic? No     Are you following a low salt diet? Yes    Are your blood pressures ever more than 140 on the top number (systolic) OR more   than 90 on the bottom number (diastolic), for example 140/90? Yes      How many servings of fruits and vegetables do you eat daily?  2-3    On average, how many sweetened beverages do you drink each day (Examples: soda, juice, sweet tea, etc.  Do NOT count diet or artificially sweetened beverages)?   0    How many days per week do you exercise enough to make your heart beat faster? 4    How many minutes a day do you exercise enough to make your heart beat faster? 60 or more    How many days per week do you miss taking your medication? 0    Patient is complaining of having on intermittent chest discomfort. Doesn't last long when it happens. Pt is stressed out with work and home life.   Will get \"funky feeling in heart\"    BP Readings from Last 6 Encounters:   02/11/20 (!) 170/90   01/13/20 (!) 162/98   01/14/19 152/80   12/17/18 158/88     He reports that he is under a lot of stress as living with his Dad and they do not get along   Will be moving soon in with his fiance   He has is still having a hard dealing with death of mom 4 years ago from endometrial cancer   Denies feeling depression, no thoughts of suicide     Has not done any sort of counseling   Stress at work-        PCP   Physician No Ref-Primary None    Health Maintenance        Health Maintenance Due   Topic Date Due     PREVENTIVE CARE VISIT  1991     DTAP/TDAP/TD IMMUNIZATION (1 - Tdap) 02/09/2002     HIV SCREENING  02/09/2006       HPI      There is no problem list on file for this patient.    Current Outpatient Medications   Medication     lisinopril (PRINIVIL/ZESTRIL) 10 MG tablet     No current facility-administered medications for " "this visit.        There is no problem list on file for this patient.    History reviewed. No pertinent surgical history.    Social History     Tobacco Use     Smoking status: Never Smoker     Smokeless tobacco: Never Used   Substance Use Topics     Alcohol use: Yes     Comment: Beer once or twice a month      Family History   Problem Relation Age of Onset     Cancer Mother      Diabetes Mother      Hypertension Mother      Diabetes Father      Hypertension Father      Hypertension Brother            Reviewed and updated:  Tobacco  Allergies  Meds  Med Hx  Surg Hx  Fam Hx  Soc Hx     ROS:  Constitutional, HEENT, cardiovascular, pulmonary, gi and gu systems are negative, except as otherwise noted.    PHYSICAL EXAM   BP (!) 170/90   Pulse 83   Temp 98.7  F (37.1  C) (Tympanic)   Resp 16   Ht 1.922 m (6' 3.67\")   Wt 124.7 kg (275 lb)   SpO2 98%   BMI 33.77 kg/m    Body mass index is 33.77 kg/m .  GENERAL: healthy, alert and no distress  EYES: Eyes grossly normal to inspection, PERRL and conjunctivae and sclerae normal  HENT: ear canals and TM's normal, nose and mouth without ulcers or lesions  NECK: no adenopathy, no asymmetry, masses, or scars and thyroid normal to palpation  RESP: lungs clear to auscultation - no rales, rhonchi or wheezes  CV: regular rate and rhythm, normal S1 S2, no S3 or S4, no murmur, click or rub, no peripheral edema and peripheral pulses strong  ABDOMEN: soft, nontender, no hepatosplenomegaly, no masses and bowel sounds normal  MS: no gross musculoskeletal defects noted, no edema  SKIN: no suspicious lesions or rashes  NEURO: Normal strength and tone, mentation intact and speech normal  PSYCH: mentation appears normal, affect normal/bright  Results for orders placed or performed in visit on 02/11/20   Basic metabolic panel     Status: None   Result Value Ref Range    Sodium 139 133 - 144 mmol/L    Potassium 4.2 3.4 - 5.3 mmol/L    Chloride 108 94 - 109 mmol/L    Carbon Dioxide 28 " "20 - 32 mmol/L    Anion Gap 3 3 - 14 mmol/L    Glucose 96 70 - 99 mg/dL    Urea Nitrogen 24 7 - 30 mg/dL    Creatinine 1.06 0.66 - 1.25 mg/dL    GFR Estimate >90 >60 mL/min/[1.73_m2]    GFR Estimate If Black >90 >60 mL/min/[1.73_m2]    Calcium 8.9 8.5 - 10.1 mg/dL   TSH with free T4 reflex     Status: None   Result Value Ref Range    TSH 1.24 0.40 - 4.00 mU/L   Lipid Profile     Status: Abnormal   Result Value Ref Range    Cholesterol 210 (H) <200 mg/dL    Triglycerides 231 (H) <150 mg/dL    HDL Cholesterol 48 >39 mg/dL    LDL Cholesterol Calculated 116 (H) <100 mg/dL    Non HDL Cholesterol 162 (H) <130 mg/dL     EKG: NSR no ST depression or elevation     Assessment & Plan     1. Atypical chest pain  Unremarkable EKG   Not really pain more hyper aware   \"funky feeling\"  - EKG 12-lead complete w/read - Clinics    2. Essential hypertension  Has history of HTN for years was on medication as an adolescent   Will start lisinopril   Labs today   Recheck in 2 weeks  - lisinopril (PRINIVIL/ZESTRIL) 10 MG tablet; Take 1 tablet (10 mg) by mouth daily  Dispense: 30 tablet; Refill: 0  - Basic metabolic panel  - TSH with free T4 reflex    He follows low sodium diet and exercise regular   Strong family history of HTN     3. Mixed hyperlipidemia  - Lipid Profile    4. Grief  Recommend looking into counseling   Card provided for [facility] intake service          Patient Instructions   Will start lisinopril 10 mg daily for blood pressure  Low sodium diet       Recheck with nurse in 2 weeks     Consider counseling    Patient Education     Established High Blood Pressure    High blood pressure (hypertension) is a chronic disease. Often, healthcare providers don t know what causes it. But it can be caused by certain health conditions and medicines.  If you have high blood pressure, you may not have any symptoms. If you do have symptoms, they may include headache, dizziness, changes in your vision, chest pain, and shortness of breath. But " even without symptoms, high blood pressure that s not treated raises your risk for heart attack, heart failure, and stroke. High blood pressure is a serious health risk and shouldn t be ignored.  Blood pressure measurements are given as 2 numbers. Systolic blood pressure is the upper number. This is the pressure when the heart contracts. Diastolic blood pressure is the lower number. This is the pressure when the heart relaxes between beats. You will see your blood pressure readings written together. For example, a person with a systolic pressure of 118 and a diastolic pressure of 78 will have 118/78 written in the medical record.  Blood pressure is categorized as normal, elevated, or stage 1 or stage 2 high blood pressure:    Normal blood pressure is systolic of less than 120 and diastolic of less than 80 (120/80)    Elevated blood pressure is systolic of 120 to 129 and diastolic less than 80    Stage 1 high blood pressure is systolic is 130 to 139 or diastolic between 80 to 89    Stage 2 high blood pressure is when systolic is 140 or higher or the diastolic is 90 or higher  Home care  If you have high blood pressure, follow these home care guidelines to help lower your blood pressure. If you are taking medicines for high blood pressure, these methods may reduce or end your need for medicines in the future.    Start a weight-loss program if you are overweight.    Cut back on how much salt you get in your diet. Here s how to do this:  ? Don t eat foods that have a lot of salt. These include olives, pickles, smoked meats, and salted potato chips.  ? Don t add salt to your food at the table.  ? Use only small amounts of salt when cooking.    Start an exercise program. Talk with your healthcare provider about the type of exercise program that would be best for you. It doesn't have to be hard. Even brisk walking for 20 minutes 3 times a week is a good form of exercise.    Don t take medicines that stimulate the heart.  This includes many over-the-counter cold and sinus decongestant pills and sprays, as well as diet pills. Check the warnings about high blood pressure on the label. Before buying any over-the-counter medicines or supplements, always ask the pharmacist about the product's potential interaction with your high blood pressure and your high blood pressure medicines.    Stimulants such as amphetamine or cocaine could be deadly for someone with high blood pressure. Never take these.    Limit how much caffeine you get in your diet. Switch to caffeine-free products.    Stop smoking. If you are a long-time smoker, this can be hard. Talk to your healthcare provider about medicines and nicotine replacement options to help you. Also, enroll in a stop-smoking program to make it more likely that you will quit for good.    Learn how to handle stress. This is an important part of any program to lower blood pressure. Learn about relaxation methods like meditation, yoga, or biofeedback.    If your provider prescribed medicines, take them exactly as directed. Missing doses may cause your blood pressure get out of control.    If you miss a dose or doses, check with your healthcare provider or pharmacist about what to do.    Consider buying an automatic blood pressure machine to check your blood pressure at home. Ask your provider for a recommendation. You can get one of these at most pharmacies.     The American Heart Association recommends the following guidelines for home blood pressure monitoring:    Don't smoke or drink coffee for 30 minutes before taking your blood pressure.    Go to the bathroom before the test.    Relax for 5 minutes before taking the measurement.    Sit with your back supported (don't sit on a couch or soft chair); keep your feet on the floor uncrossed. Place your arm on a solid flat surface (like a table) with the upper part of the arm at heart level. Place the middle of the cuff directly above the bend of the  elbow. Check the monitor's instruction manual for an illustration.    Take multiple readings. When you measure, take 2 to 3 readings one minute apart and record all of the results.    Take your blood pressure at the same time every day, or as your healthcare provider recommends.    Record the date, time, and blood pressure reading.    Take the record with you to your next medical appointment. If your blood pressure monitor has a built-in memory, simply take the monitor with you to your next appointment.    Call your provider if you have several high readings. Don't be frightened by a single high blood pressure reading, but if you get several high readings, check in with your healthcare provider.    Note: When blood pressure reaches a systolic (top number) of 180 or higher OR diastolic (bottom number) of 110 or higher, seek emergency medical treatment.  Follow-up care  You will need to see your healthcare provider regularly. This is to check your blood pressure and to make changes to your medicines. Make a follow-up appointment as directed. Bring the record of your home blood pressure readings to the appointment.  When to seek medical advice  Call your healthcare provider right away if any of these occur:    Blood pressure reaches a systolic (upper number) of 180 or higher OR a diastolic (bottom number) of 110 or higher    Chest pain or shortness of breath    Severe headache    Throbbing or rushing sound in the ears    Nosebleed    Sudden severe pain in your belly (abdomen)    Extreme drowsiness, confusion, or fainting    Dizziness or spinning sensation (vertigo)    Weakness of an arm or leg or one side of the face    You have problems speaking or seeing   Date Last Reviewed: 12/1/2016 2000-2019 [employer]. [address] [place] [address], [place] [address]. All rights reserved. This information is not intended as a substitute for professional medical care. Always follow your healthcare professional's  instructions.               Return in about 2 weeks (around 2/25/2020) for BP Recheck.    Susy Kapoor NP  [facility]      Risks, benefits, side effects and rationale for treatment plan fully discussed with the patient and understanding expressed.

## 2020-03-06 ENCOUNTER — ALLIED HEALTH/NURSE VISIT (OUTPATIENT)
Dept: FAMILY MEDICINE | Facility: CLINIC | Age: 30
End: 2020-03-06
Payer: COMMERCIAL

## 2020-03-06 ENCOUNTER — TELEPHONE (OUTPATIENT)
Dept: FAMILY MEDICINE | Facility: CLINIC | Age: 30
End: 2020-03-06

## 2020-03-06 VITALS — DIASTOLIC BLOOD PRESSURE: 78 MMHG | SYSTOLIC BLOOD PRESSURE: 124 MMHG

## 2020-03-06 DIAGNOSIS — I10 ESSENTIAL HYPERTENSION: Primary | ICD-10-CM

## 2020-03-06 DIAGNOSIS — I10 ESSENTIAL HYPERTENSION: ICD-10-CM

## 2020-03-06 PROCEDURE — 99207 ZZC NO CHARGE NURSE ONLY: CPT

## 2020-03-06 NOTE — PROGRESS NOTES
"Rogelio Noel is a 30 year old year old patient who comes in today for a Blood Pressure check because of new medication.  Vital Signs as repeated by RN. Offered to do a second BP and pt declined.    Vitals:    03/06/20 1504   BP: 124/78   BP Location: Right arm   Patient Position: Sitting   Cuff Size: Adult Large     Patient is taking medication as prescribed  Pt noticed he may have a dry cough, but its not every day and only once in while: \"It definitly started after I started taking the medication.\"   Patient is monitoring Blood Pressure at home. Pt says he waited three days before starting the Rx and his BP was regularly 120/80. Says he has checked it only a couple times since starting the lisinopril. He says his BP tends to run higher when he is at the clinic.     Current complaints: Intermittent feeling persists in chest. Description of what he is feeling is non-specific and vague. Is not painful, but uncomfortable. Notices it when he is at rest, not with exertion. EKG was normal at 2/11/2020 appt when this was also discussed.     Did sign up for grief counseling. First session on Monday.     Pharmacy is: [facility] - [address]     Disposition:  Routing to PCP to review and advise.    Denita DIXON RN        "

## 2020-03-06 NOTE — TELEPHONE ENCOUNTER
"Rogelio Noel is a 30 year old year old patient who comes in today for a Blood Pressure check because of new medication.  Vital Signs as repeated by RN. Offered to do a second BP and pt declined.    Vitals:    03/06/20 1504   BP: 124/78   BP Location: Right arm   Patient Position: Sitting   Cuff Size: Adult Large     Patient is taking medication as prescribed  Pt noticed he may have a dry cough, but its not every day and only once in while: \"It definitly started after I started taking the medication.\"   Patient is monitoring Blood Pressure at home. Pt says he waited three days before starting the Rx and his BP was regularly 120/80. Says he has checked it only a couple times since starting the lisinopril. He says his BP tends to run higher when he is at the clinic.     Current complaints: Intermittent feeling persists in chest. Description of what he is feeling is non-specific and vague. Is not painful, but uncomfortable. Notices it when he is at rest, not with exertion. EKG was normal at 2/11/2020 appt when this was also discussed.     Did sign up for grief counseling. First session on Monday.     Pharmacy is: [facility] - [address]     Disposition:  Routing to PCP to review and advise.    Denita DIXON RN      "

## 2020-03-09 RX ORDER — LISINOPRIL 10 MG/1
10 TABLET ORAL DAILY
Qty: 90 TABLET | Refills: 0 | Status: SHIPPED | OUTPATIENT
Start: 2020-03-09 | End: 2020-06-01

## 2020-03-09 NOTE — TELEPHONE ENCOUNTER
Reviewed. No change in POC.  If cough becomes more bothersome let us know- would consider changing to different medications.     See me back in 3 months for a recheck.  Emmie ROMAN

## 2020-03-09 NOTE — TELEPHONE ENCOUNTER
Pt informed/ advised as noted per Emmie.   Lisinopril refilled. To contact clinic nurse if cough becomes more bothersome.  F/U appt scheduled 06-26-20.  CHARLIE Martinez RN

## 2020-06-01 ENCOUNTER — VIRTUAL VISIT (OUTPATIENT)
Dept: FAMILY MEDICINE | Facility: CLINIC | Age: 30
End: 2020-06-01
Payer: COMMERCIAL

## 2020-06-01 VITALS
DIASTOLIC BLOOD PRESSURE: 74 MMHG | SYSTOLIC BLOOD PRESSURE: 112 MMHG | WEIGHT: 272 LBS | TEMPERATURE: 98.1 F | HEART RATE: 84 BPM | BODY MASS INDEX: 33.4 KG/M2

## 2020-06-01 DIAGNOSIS — I10 BENIGN ESSENTIAL HYPERTENSION: ICD-10-CM

## 2020-06-01 PROCEDURE — 99213 OFFICE O/P EST LOW 20 MIN: CPT | Mod: TEL | Performed by: NURSE PRACTITIONER

## 2020-06-01 NOTE — PROGRESS NOTES
"Rogelio Noel is a 30 year old male who is being evaluated via a billable telephone visit.      The patient has been notified of following:     \"This telephone visit will be conducted via a call between you and your physician/provider. We have found that certain health care needs can be provided without the need for a physical exam.  This service lets us provide the care you need with a short phone conversation.  If a prescription is necessary we can send it directly to your pharmacy.  If lab work is needed we can place an order for that and you can then stop by our lab to have the test done at a later time.    Telephone visits are billed at different rates depending on your insurance coverage. During this emergency period, for some insurers they may be billed the same as an in-person visit.  Please reach out to your insurance provider with any questions.    If during the course of the call the physician/provider feels a telephone visit is not appropriate, you will not be charged for this service.\"    Patient has given verbal consent for Telephone visit?  Yes    What phone number would you like to be contacted at? [phone]    How would you like to obtain your AVS? Mail a copy    Subjective     Rogelio Noel is a 30 year old male who presents via phone visit today for the following health issues:    HPI     Hypertension Follow-up      Do you check your blood pressure regularly outside of the clinic? Yes - NOT CONSISTENTLY    Are you following a low salt diet? Yes    Are your blood pressures ever more than 140 on the top number (systolic) OR more   than 90 on the bottom number (diastolic), for example 140/90? No      How many servings of fruits and vegetables do you eat daily?  2-3    On average, how many sweetened beverages do you drink each day (Examples: soda, juice, sweet tea, etc.  Do NOT count diet or artificially sweetened beverages)?   0    How many days per week do you exercise enough to make your heart " beat faster? 7    How many minutes a day do you exercise enough to make your heart beat faster? 30 - 60    How many days per week do you miss taking your medication? 0    Feels well  No concerns  Stress levels are definitely better       Patient Active Problem List   Diagnosis     Benign essential hypertension     History reviewed. No pertinent surgical history.    Social History     Tobacco Use     Smoking status: Never Smoker     Smokeless tobacco: Never Used   Substance Use Topics     Alcohol use: Yes     Comment: Beer once or twice a month      Family History   Problem Relation Age of Onset     Cancer Mother      Diabetes Mother      Hypertension Mother      Diabetes Father      Hypertension Father      Hypertension Brother          Current Outpatient Medications   Medication Sig Dispense Refill     lisinopril (ZESTRIL) 10 MG tablet Take 1 tablet (10 mg) by mouth daily 90 tablet 1     No Known Allergies    Reviewed and updated as needed this visit by Provider  Tobacco  Allergies  Meds  Problems  Med Hx  Surg Hx  Fam Hx         Review of Systems   Constitutional, HEENT, cardiovascular, pulmonary, gi and gu systems are negative, except as otherwise noted.       Objective   Reported vitals:  /74   Pulse 84   Temp 98.1  F (36.7  C) (Tympanic)   Wt 123.4 kg (272 lb)   BMI 33.40 kg/m     healthy, alert and no distress  PSYCH: Alert and oriented times 3; coherent speech, normal   rate and volume, able to articulate logical thoughts, able   to abstract reason, no tangential thoughts, no hallucinations   or delusions  His affect is normal and pleasant  RESP: No cough, no audible wheezing, able to talk in full sentences  Remainder of exam unable to be completed due to telephone visits    Diagnostic Test Results:  Labs reviewed in Epic  none         Assessment/Plan:  1. Benign essential hypertension  Chronic, stable. No changes in medications. Refill put through today for 6 months. Follow-up in 6 months  for recheck and labs.       Return in about 6 months (around 12/1/2020) for Recheck.      Phone call duration:  5 minutes    JM Estes CNP

## 2020-06-01 NOTE — PATIENT INSTRUCTIONS
No changes in medications     Refill in to pharmacy     No labs needed today     Return to clinic in 6 months to see primary care provider for recheck and refill

## 2020-07-22 ENCOUNTER — OFFICE VISIT (OUTPATIENT)
Dept: FAMILY MEDICINE | Facility: CLINIC | Age: 30
End: 2020-07-22
Payer: COMMERCIAL

## 2020-07-22 VITALS
OXYGEN SATURATION: 97 % | DIASTOLIC BLOOD PRESSURE: 72 MMHG | WEIGHT: 273 LBS | RESPIRATION RATE: 18 BRPM | BODY MASS INDEX: 33.52 KG/M2 | HEART RATE: 80 BPM | SYSTOLIC BLOOD PRESSURE: 130 MMHG | TEMPERATURE: 97.3 F

## 2020-07-22 DIAGNOSIS — M25.571 PAIN IN JOINT INVOLVING ANKLE AND FOOT, RIGHT: Primary | ICD-10-CM

## 2020-07-22 PROCEDURE — 99213 OFFICE O/P EST LOW 20 MIN: CPT | Performed by: PHYSICIAN ASSISTANT

## 2020-07-22 ASSESSMENT — PAIN SCALES - GENERAL: PAINLEVEL: NO PAIN (0)

## 2020-07-22 NOTE — PATIENT INSTRUCTIONS
Patient Education     RICE     Rest an injury, elevate it, and use ice and compression as directed.   RICE stands for rest, ice, compression, and elevation. These can limit pain and swelling after an injury. RICE may be recommended to help treat breaks (fractures), sprains, strains, and bruises or bumps.   Home care  Here are the details of RICE:    Rest. Limit the use of the injured body part. This helps prevent further damage to the body part and gives it time to heal. In some cases, you may need a sling, brace, splint, or cast to help keep the body part still until it has healed.    Ice. Applying ice right after an injury helps relieve pain and swelling. To make an ice pack, put ice cubes in a plastic bag that seals at the top. Wrap the bag in a clean, thin towel or cloth. Then place it over the injured area. Do this for 10 to 15 minutes every 3 to 4 hours. Continue for the next 1 to 3 days or until your symptoms improve. Never put ice directly on your skin. Don't ice an area longer than 15 minutes at a time.    Compression. Putting pressure on an injury helps reduce swelling and provides support. Wrap the injured area firmly with an elastic bandage or wrap. Make sure not to wrap the bandage too tightly or you will cut off blood flow to the injured area. If your bandage loosens, rewrap it.    Elevation. Keeping an injury raised or elevated above the level of your heart reduces swelling, pain, and throbbing. For instance, if you have a broken leg, it may help to rest your leg on several pillows when sitting or lying down. Try to keep the injured area elevated as often as possible.  Follow-up care  Follow up with your healthcare provider, or as advised.  When to seek medical advice  Call your healthcare provider right away if any of these occur:    Fever of 100.4 F (38 C) or higher, or as directed by your healthcare provider    Chills    Increased pain or swelling in the injured body part    Injured body part  becomes cold, blue, numb, or tingly    Signs of infection. These include warmth in the skin, redness, drainage, or bad smell coming from the injured body part.  Date Last Reviewed: 6/1/2018 2000-2019 [employer]. [address] [place] [address]. All rights reserved. This information is not intended as a substitute for professional medical care. Always follow your healthcare professional's instructions.

## 2020-07-22 NOTE — PROGRESS NOTES
Subjective     Rogelio Noel is a 30 year old male who presents to clinic today for the following health issues:    HPI       Musculoskeletal problem/pain    Duration: Since last night. No pain today. Pt does note a hx of recurrent pain several times per year.     Description  Location: Right foot-primarily the heel to the right side of the foot     Intensity:  moderate, severe    Accompanying signs and symptoms: none    History  Previous similar problem: YES: 5-6 years ago had some foot pain and was told it was planters fasciitis. In that process with MRI he was told that he had cartilage issues and bone spurs   Previous evaluation:  x-ray and MRI    Precipitating or alleviating factors:  Trauma or overuse: YES  Aggravating factors include: walking    Therapies tried and outcome: rest/inactivity and Ibuprofen    Patient Active Problem List   Diagnosis     Benign essential hypertension     History reviewed. No pertinent surgical history.    Social History     Tobacco Use     Smoking status: Never Smoker     Smokeless tobacco: Never Used   Substance Use Topics     Alcohol use: Yes     Comment: Beer once or twice a month      Family History   Problem Relation Age of Onset     Cancer Mother      Diabetes Mother      Hypertension Mother      Diabetes Father      Hypertension Father      Hypertension Brother          Current Outpatient Medications   Medication Sig Dispense Refill     lisinopril (ZESTRIL) 10 MG tablet Take 1 tablet (10 mg) by mouth daily 90 tablet 1     No Known Allergies    Reviewed and updated as needed this visit by Provider         Review of Systems   Constitutional, msk, neuro, skin systems are negative, except as otherwise noted.      Objective    /72 (BP Location: Right arm, Patient Position: Sitting, Cuff Size: Adult Large)   Pulse 80   Temp 97.3  F (36.3  C) (Tympanic)   Resp 18   Wt 123.8 kg (273 lb)   SpO2 97%   BMI 33.52 kg/m    Body mass index is 33.52 kg/m .  Physical Exam    Constitutional: healthy, alert, and no distress  Head: Normocephalic. Atraumatic  Eyes: No conjunctival injection, sclera anicteric  Respiratory: No resp distress.  Musculoskeletal: extremities normal- no gross deformities noted, and normal muscle tone. There is no tenderness over the R calcaneus or achilles tendon. FROM of the ankle without reproducible tenderness.   Neurologic: Gait normal. CN 2-12 grossly intact. Able to wiggle toes. 5/5 strength in plantar and dorsiflexion.   Psychiatric: mentation appears normal and affect normal/bright     Diagnostic Test Results:  None        Assessment & Plan   (M25.571) Pain in joint involving ankle and foot, right  (primary encounter diagnosis)  Comment: Recurrent symptoms over 5-6 years. Reviewed XR and MRI in Care Everywhere. Pt has multiple bone spurs of the talus with possible anterior ankle impingement. His symptoms are not really consistent with this today, but he hasn't been evaluated for this since his MRI by an Orthopedist. No evidence of plantar fasciitis on exam today. Given the duration and recurrent nature of his symptoms, he was referred to Ortho for their evaluation and work up. In the meantime, use RICE therapy, and he was given a home ankle exercise program.   Plan: Orthopedic & Spine  Referral    Return in about 4 weeks (around 8/19/2020), or if symptoms worsen or fail to improve, for In-Clinic Visit.    Aiden Estevez PA-C  [facility]

## 2020-07-29 ENCOUNTER — OFFICE VISIT (OUTPATIENT)
Dept: PODIATRY | Facility: CLINIC | Age: 30
End: 2020-07-29
Payer: COMMERCIAL

## 2020-07-29 ENCOUNTER — ANCILLARY PROCEDURE (OUTPATIENT)
Dept: GENERAL RADIOLOGY | Facility: CLINIC | Age: 30
End: 2020-07-29
Attending: PODIATRIST
Payer: COMMERCIAL

## 2020-07-29 VITALS
WEIGHT: 273 LBS | BODY MASS INDEX: 33.24 KG/M2 | DIASTOLIC BLOOD PRESSURE: 78 MMHG | SYSTOLIC BLOOD PRESSURE: 132 MMHG | HEART RATE: 78 BPM | HEIGHT: 76 IN

## 2020-07-29 DIAGNOSIS — M25.571 PAIN IN JOINT INVOLVING ANKLE AND FOOT, RIGHT: ICD-10-CM

## 2020-07-29 DIAGNOSIS — M77.31 POSTERIOR CALCANEAL SPUR OF RIGHT FOOT: ICD-10-CM

## 2020-07-29 DIAGNOSIS — M77.51 BONE SPUR OF RIGHT ANKLE: Primary | ICD-10-CM

## 2020-07-29 PROCEDURE — 73610 X-RAY EXAM OF ANKLE: CPT | Mod: RT

## 2020-07-29 PROCEDURE — 99203 OFFICE O/P NEW LOW 30 MIN: CPT | Performed by: PODIATRIST

## 2020-07-29 ASSESSMENT — MIFFLIN-ST. JEOR: SCORE: 2299.58

## 2020-07-29 ASSESSMENT — PAIN SCALES - GENERAL: PAINLEVEL: MILD PAIN (3)

## 2020-07-29 NOTE — LETTER
7/29/2020         RE: Rogelio Noel  [id number] [facility] [address]        Dear Colleague,    Thank you for referring your patient, Rogelio Noel, to the [facility]. Please see a copy of my visit note below.    PATIENT HISTORY:  Rogelio Noel is a 30 year old male who presents to clinic for a painful right foot .  The patient describes the pain as sharp stabbing.  The patient relates the pain level is moderate.  The patient relates pain is located mostly to the back of the right heel but also the front of the right ankle.  The patient relates the pain has been present for the past several months.  The patient relates pain with ambulation.  The patient has tried different shoes with little relief.       REVIEW OF SYSTEMS:  Constitutional, HEENT, cardiovascular, pulmonary, GI, , musculoskeletal, neuro, skin, endocrine and psych systems are negative, except as otherwise noted.     PAST MEDICAL HISTORY: No past medical history on file.     PAST SURGICAL HISTORY: No past surgical history on file.     MEDICATIONS:   Current Outpatient Medications:      lisinopril (ZESTRIL) 10 MG tablet, Take 1 tablet (10 mg) by mouth daily, Disp: 90 tablet, Rfl: 1     ALLERGIES:  No Known Allergies     SOCIAL HISTORY:   Social History     Socioeconomic History     Marital status: Single     Spouse name: Not on file     Number of children: Not on file     Years of education: Not on file     Highest education level: Not on file   Occupational History     Not on file   Social Needs     Financial resource strain: Not on file     Food insecurity     Worry: Not on file     Inability: Not on file     Transportation needs     Medical: Not on file     Non-medical: Not on file   Tobacco Use     Smoking status: Never Smoker     Smokeless tobacco: Never Used   Substance and Sexual Activity     Alcohol use: Yes     Comment: Beer once or twice a month      Drug use: No     Sexual activity: Yes     Partners:  "Female   Lifestyle     Physical activity     Days per week: Not on file     Minutes per session: Not on file     Stress: Not on file   Relationships     Social connections     Talks on phone: Not on file     Gets together: Not on file     Attends Anabaptism service: Not on file     Active member of club or organization: Not on file     Attends meetings of clubs or organizations: Not on file     Relationship status: Not on file     Intimate partner violence     Fear of current or ex partner: Not on file     Emotionally abused: Not on file     Physically abused: Not on file     Forced sexual activity: Not on file   Other Topics Concern     Not on file   Social History Narrative     Not on file        FAMILY HISTORY:   Family History   Problem Relation Age of Onset     Cancer Mother      Diabetes Mother      Hypertension Mother      Diabetes Father      Hypertension Father      Hypertension Brother         EXAM:Vitals: /78   Pulse 78   Ht 1.922 m (6' 3.67\")   Wt 123.8 kg (273 lb)   BMI 33.52 kg/m    BMI= Body mass index is 33.52 kg/m .    Weight management plan: Patient was referred to their PCP to discuss a diet and exercise plan.    General appearance: Patient is alert and fully cooperative with history & exam.  No sign of distress is noted during the visit.     Psychiatric: Affect is pleasant & appropriate.  Patient appears motivated to improve health.     Respiratory: Breathing is regular & unlabored while sitting.     HEENT: Hearing is intact to spoken word.  Speech is clear.  No gross evidence of visual impairment that would impact ambulation.     Dermatologic: Skin is intact to right lower extremities without significant lesions, rash or abrasion.  No paronychia or evidence of soft tissue infection is noted.     Vascular: DP & PT pulses are intact & regular on the right.  No significant edema or varicosities noted.  CFT and skin temperature is normal to the right lower extremities.     Neurologic: Lower " extremity sensation is intact to light touch.  No evidence of weakness or contracture in the right lower extremities.  No evidence of neuropathy.     Musculoskeletal: Patient is ambulatory without assistive device or brace.  No gross ankle deformity noted.  No foot or ankle joint effusion is noted.  Noted pain on palpation of the posterior aspect of the right heel at the insertion point of the Achilles tendon.  No surrounding erythema or edema noted.  Has a tight gastroc complex on the right lower extremity.  1 notes pain on palpation of the anterior aspect of the right ankle with pain on maximum dorsiflexion of the right ankle noted.  No surrounding erythema noted.    Radiographs were evaluated including AP, lateral and medial oblique views of the ankle foot reveals a rather large anterior spur.  One also notes a posterior calcaneal spur.  No cortical erosions or periosteal elevation.  All joint margins appear stable.  There is no apparent fracture or tumor formation noted.  There is no evidence of foreign body.    ASSESSMENT / PLAN:     ICD-10-CM    1. Bone spur of right ankle  M77.51    2. Pain in joint involving ankle and foot, right  M25.571 Orthopedic & Spine  Referral     XR Ankle Right G/E 3 Views   3. Posterior calcaneal spur of right foot  M77.31        I have explained to Rogelio  about the conditions.  We discussed the nature of the condition as well as the treatment plan and expected length of recovery.  At this time, the patient was instructed on icing, stretching, tissue massage and support.  The patient was fitted with 1/4 inch heel lift that will aid in offloading the tension forces to the soft tissues and prevent further inflammation.    The patient will return in four weeks for reevaluation if the symptoms do not resolve.      Rogelio verbalized agreement with and understanding of the rational for the diagnosis and treatment plan.  All questions were answered to best of my ability and the  patient's satisfaction. The patient was advised to contact the clinic with any questions that may arise after the clinic visit.      Disclaimer: This note consists of symbols derived from keyboarding, dictation and/or voice recognition software. As a result, there may be errors in the script that have gone undetected. Please consider this when interpreting information found in this chart.       MALINA Melendez D.P.M., F.[place].C.F.A.S.        Again, thank you for allowing me to participate in the care of your patient.        Sincerely,        Eduardo Melendez DPM

## 2020-07-29 NOTE — PROGRESS NOTES
PATIENT HISTORY:  Rogelio Noel is a 30 year old male who presents to clinic for a painful right foot .  The patient describes the pain as sharp stabbing.  The patient relates the pain level is moderate.  The patient relates pain is located mostly to the back of the right heel but also the front of the right ankle.  The patient relates the pain has been present for the past several months.  The patient relates pain with ambulation.  The patient has tried different shoes with little relief.       REVIEW OF SYSTEMS:  Constitutional, HEENT, cardiovascular, pulmonary, GI, , musculoskeletal, neuro, skin, endocrine and psych systems are negative, except as otherwise noted.     PAST MEDICAL HISTORY: No past medical history on file.     PAST SURGICAL HISTORY: No past surgical history on file.     MEDICATIONS:   Current Outpatient Medications:      lisinopril (ZESTRIL) 10 MG tablet, Take 1 tablet (10 mg) by mouth daily, Disp: 90 tablet, Rfl: 1     ALLERGIES:  No Known Allergies     SOCIAL HISTORY:   Social History     Socioeconomic History     Marital status: Single     Spouse name: Not on file     Number of children: Not on file     Years of education: Not on file     Highest education level: Not on file   Occupational History     Not on file   Social Needs     Financial resource strain: Not on file     Food insecurity     Worry: Not on file     Inability: Not on file     Transportation needs     Medical: Not on file     Non-medical: Not on file   Tobacco Use     Smoking status: Never Smoker     Smokeless tobacco: Never Used   Substance and Sexual Activity     Alcohol use: Yes     Comment: Beer once or twice a month      Drug use: No     Sexual activity: Yes     Partners: Female   Lifestyle     Physical activity     Days per week: Not on file     Minutes per session: Not on file     Stress: Not on file   Relationships     Social connections     Talks on phone: Not on file     Gets together: Not on file     Attends  "Anabaptism service: Not on file     Active member of club or organization: Not on file     Attends meetings of clubs or organizations: Not on file     Relationship status: Not on file     Intimate partner violence     Fear of current or ex partner: Not on file     Emotionally abused: Not on file     Physically abused: Not on file     Forced sexual activity: Not on file   Other Topics Concern     Not on file   Social History Narrative     Not on file        FAMILY HISTORY:   Family History   Problem Relation Age of Onset     Cancer Mother      Diabetes Mother      Hypertension Mother      Diabetes Father      Hypertension Father      Hypertension Brother         EXAM:Vitals: /78   Pulse 78   Ht 1.922 m (6' 3.67\")   Wt 123.8 kg (273 lb)   BMI 33.52 kg/m    BMI= Body mass index is 33.52 kg/m .    Weight management plan: Patient was referred to their PCP to discuss a diet and exercise plan.    General appearance: Patient is alert and fully cooperative with history & exam.  No sign of distress is noted during the visit.     Psychiatric: Affect is pleasant & appropriate.  Patient appears motivated to improve health.     Respiratory: Breathing is regular & unlabored while sitting.     HEENT: Hearing is intact to spoken word.  Speech is clear.  No gross evidence of visual impairment that would impact ambulation.     Dermatologic: Skin is intact to right lower extremities without significant lesions, rash or abrasion.  No paronychia or evidence of soft tissue infection is noted.     Vascular: DP & PT pulses are intact & regular on the right.  No significant edema or varicosities noted.  CFT and skin temperature is normal to the right lower extremities.     Neurologic: Lower extremity sensation is intact to light touch.  No evidence of weakness or contracture in the right lower extremities.  No evidence of neuropathy.     Musculoskeletal: Patient is ambulatory without assistive device or brace.  No gross ankle " deformity noted.  No foot or ankle joint effusion is noted.  Noted pain on palpation of the posterior aspect of the right heel at the insertion point of the Achilles tendon.  No surrounding erythema or edema noted.  Has a tight gastroc complex on the right lower extremity.  1 notes pain on palpation of the anterior aspect of the right ankle with pain on maximum dorsiflexion of the right ankle noted.  No surrounding erythema noted.    Radiographs were evaluated including AP, lateral and medial oblique views of the ankle foot reveals a rather large anterior spur.  One also notes a posterior calcaneal spur.  No cortical erosions or periosteal elevation.  All joint margins appear stable.  There is no apparent fracture or tumor formation noted.  There is no evidence of foreign body.    ASSESSMENT / PLAN:     ICD-10-CM    1. Bone spur of right ankle  M77.51    2. Pain in joint involving ankle and foot, right  M25.571 Orthopedic & Spine  Referral     XR Ankle Right G/E 3 Views   3. Posterior calcaneal spur of right foot  M77.31        I have explained to Rogelio  about the conditions.  We discussed the nature of the condition as well as the treatment plan and expected length of recovery.  At this time, the patient was instructed on icing, stretching, tissue massage and support.  The patient was fitted with 1/4 inch heel lift that will aid in offloading the tension forces to the soft tissues and prevent further inflammation.    The patient will return in four weeks for reevaluation if the symptoms do not resolve.      Rogelio verbalized agreement with and understanding of the rational for the diagnosis and treatment plan.  All questions were answered to best of my ability and the patient's satisfaction. The patient was advised to contact the clinic with any questions that may arise after the clinic visit.      Disclaimer: This note consists of symbols derived from keyboarding, dictation and/or voice recognition  software. As a result, there may be errors in the script that have gone undetected. Please consider this when interpreting information found in this chart.       MALINA Melendez D.P.M., JOSE.F.[employer].

## 2020-07-29 NOTE — NURSING NOTE
"Chief Complaint   Patient presents with     Consult     MRI 5 years ago-right ankle and joint pain       Initial /78   Pulse 78   Ht 1.922 m (6' 3.67\")   Wt 123.8 kg (273 lb)   BMI 33.52 kg/m   Estimated body mass index is 33.52 kg/m  as calculated from the following:    Height as of this encounter: 1.922 m (6' 3.67\").    Weight as of this encounter: 123.8 kg (273 lb).  Medications and allergies reviewed.      Verónica LYLES MA    "

## 2020-10-20 ENCOUNTER — VIRTUAL VISIT (OUTPATIENT)
Dept: FAMILY MEDICINE | Facility: CLINIC | Age: 30
End: 2020-10-20
Payer: COMMERCIAL

## 2020-10-20 DIAGNOSIS — Z20.822 ENCOUNTER FOR LABORATORY TESTING FOR COVID-19 VIRUS: Primary | ICD-10-CM

## 2020-10-20 DIAGNOSIS — R05.9 COUGH: ICD-10-CM

## 2020-10-20 PROCEDURE — 99213 OFFICE O/P EST LOW 20 MIN: CPT | Mod: 95 | Performed by: NURSE PRACTITIONER

## 2020-10-20 NOTE — PATIENT INSTRUCTIONS
"Discharge Instructions for COVID-19 Patients  You have--or may have--COVID-19. Please follow the instructions listed below.   If you have a weakened immune system, discuss with your doctor any other actions you need to take.  How can I protect others?  If you have symptoms (fever, cough, body aches or trouble breathing):    Stay home and away from others (self-isolate) until:  ? At least 10 days have passed since your symptoms started, And   ? You've had no fever--and no medicine that reduces fever--for 1 full day (24 hours), And    ? Your other symptoms have resolved (gotten better).  If you don't show symptoms, but testing showed that you have COVID-19:    Stay home and away from others (self-isolate). Follow the tips under \"How do I self-isolate?\" below for 10 days (20 days if you have a weak immune system).    You don't need to be retested for COVID-19 before going back to school or work. As long as you're fever-free and feeling better, you can go back to school, work and other activities after waiting the 10 or 20 days.   How do I self-isolate?    Stay in your own room, even for meals. Use your own bathroom if you can.    Stay away from others in your home. No hugging, kissing or shaking hands. No visitors.    Don't go to work, school or anywhere else.    Clean \"high touch\" surfaces often (doorknobs, counters, handles). Use household cleaning spray or wipes. You'll find a full list of  on the EPA website: www.epa.gov/pesticide-registration/list-n-disinfectants-use-against-sars-cov-2.    Cover your mouth and nose with a mask or other face covering to avoid spreading germs.    Wash your hands and face often. Use soap and water.    Caregivers in these groups are at risk for severe illness due to COVID-19:  ? People 65 years and older  ? People who live in a nursing home or long-term care facility  ? People with chronic disease (lung, heart, cancer, diabetes, kidney, liver, immunologic)  ? People who have a " weakened immune system, including those who:    Are in cancer treatment    Take medicine that weakens the immune system, such as corticosteroids    Had a bone marrow or organ transplant    Have an immune deficiency    Have poorly controlled HIV or AIDS    Are obese (body mass index of 40 or higher)    Smoke regularly    Caregivers should wear gloves while washing dishes, handling laundry and cleaning bedrooms and bathrooms.    Use caution when washing and drying laundry: Don't shake dirty laundry and use the warmest water setting that you can.    For more tips on managing your health at home, go to www.cdc.gov/coronavirus/2019-ncov/downloads/10Things.pdf.  How can I take care of myself at home?  1. Get lots of rest. Drink extra fluids (unless a doctor has told you not to).    2. Take Tylenol (acetaminophen) for fever or pain. If you have liver or kidney problems, ask your family doctor if it's okay to take Tylenol.     Adults can take either:  ? 650 mg (two 325 mg pills) every 4 to 6 hours, or   ? 1,000 mg (two 500 mg pills) every 8 hours as needed.  ? Note: Don't take more than 3,000 mg in one day. Acetaminophen is found in many medicines (both prescribed and over-the-counter medicines). Read all labels to be sure you don't take too much.   For children, check the Tylenol bottle for the right dose. The dose is based on the child's age or weight.  3. If you have other health problems (like cancer, heart failure, an organ transplant or severe kidney disease): Call your specialty clinic if you don't feel better in the next 2 days.    4. Know when to call 911. Emergency warning signs include:  ? Trouble breathing or shortness of breath  ? Pain or pressure in the chest that doesn't go away  ? Feeling confused like you haven't felt before, or not being able to wake up  ? Bluish-colored lips or face    5. Your doctor may have prescribed a blood thinner medicine. Follow their instructions.  Where can I get more  information?    [facility] - About COVID-19: [email or web address]    CDC - What to Do If You're Sick: www.cdc.gov/coronavirus/2019-ncov/about/steps-when-sick.html    CDC - Ending Home Isolation: www.cdc.gov/coronavirus/2019-ncov/hcp/disposition-in-home-patients.html    CDC - Caring for Someone: www.cdc.gov/coronavirus/2019-ncov/if-you-are-sick/care-for-someone.html    [facility] - Interim Guidance for Hospital Discharge to Home: www.health.[facility].[place]./diseases/coronavirus/hcp/hospdischarge.pdf    [facility] clinical trials (COVID-19 research studies): clinicalaffairs.[facility]-clinical-trials    Below are the COVID-19 hotlines at the [place] Department of Health ([facility]). Interpreters are available.  ? For health questions: Call 984-412-9024 or 1-939.232.5596 (7 a.m. to 7 p.m.)  ? For questions about schools and childcare: Call 517-783-3111 or 1-896.245.5852 (7 a.m. to 7 p.m.)    For informational purposes only. Not to replace the advice of your health care provider. Clinically reviewed by the Infection Prevention Team. Copyright   2020 [employer]. All rights reserved. [employer] [id number] - REV 08/04/20.

## 2020-10-20 NOTE — PROGRESS NOTES
"Rogelio Noel is a 30 year old male who is being evaluated via a billable video visit.      The patient has been notified of following:     \"This video visit will be conducted via a call between you and your physician/provider. We have found that certain health care needs can be provided without the need for an in-person physical exam.  This service lets us provide the care you need with a video conversation.  If a prescription is necessary we can send it directly to your pharmacy.  If lab work is needed we can place an order for that and you can then stop by our lab to have the test done at a later time.    Video visits are billed at different rates depending on your insurance coverage.  Please reach out to your insurance provider with any questions.    If during the course of the call the physician/provider feels a video visit is not appropriate, you will not be charged for this service.\"    Patient has given verbal consent for Video visit? Yes  How would you like to obtain your AVS? Mail a copy  If you are dropped from the video visit, the video invite should be resent to: Text to cell phone: [phone]  Will anyone else be joining your video visit? No      Subjective     Rogelio Noel is a 30 year old male who presents today via video visit for the following health issues:    HPI       Concern for COVID-19  About how many days ago did these symptoms start? STARTED Friday   Is this your first visit for this illness? Yes  In the 14 days before your symptoms started, have you had close contact with someone with COVID-19 (Coronavirus)? I do not know.  Do you have a fever or chills? Yes, I felt feverish or had chills - started yesterday   Are you having new or worsening difficulty breathing? No  Do you have new or worsening cough? Yes, it's a dry cough.  Hurts sometimes   Have you had any new or unexplained body aches? No    Have you experienced any of the following NEW symptoms?    Headache: YES - all night last " night     Sore throat: YES    Loss of taste or smell: No    Chest pain: No    Diarrhea: No    Rash: No  What treatments have you tried? ESSENTIAL OILS   Who do you live with? WIFE   Are you, or a household member, a healthcare worker or a ? No  Do you live in a nursing home, group home, or shelter? No  Do you have a way to get food/medications if quarantined? Yes             Video Start Time: 11:04 AM    Review of Systems   Constitutional, HEENT, cardiovascular, pulmonary, gi and gu systems are negative, except as otherwise noted.      Objective           Vitals:  No vitals were obtained today due to virtual visit.    Physical Exam     GENERAL: Healthy, alert and no distress  EYES: Eyes grossly normal to inspection.  No discharge or erythema, or obvious scleral/conjunctival abnormalities.  RESP: No audible wheeze, cough, or visible cyanosis.  No visible retractions or increased work of breathing.    SKIN: Visible skin clear. No significant rash, abnormal pigmentation or lesions.  NEURO: Cranial nerves grossly intact.  Mentation and speech appropriate for age.  PSYCH: Mentation appears normal, affect normal/bright, judgement and insight intact, normal speech and appearance well-groomed.      Diagnostic Test Results:  COVID pending          Assessment & Plan     1. Encounter for laboratory testing for COVID-19 virus  Acute symptoms of cough, chills, sore throat, and congestion since Friday. Possible fever. Given symptoms would recommend COVID testing. Discussed quarantine guidelines with patient per patient instructions.   - Symptomatic COVID-19 Virus (Coronavirus) by PCR; Future    2. Cough    - Symptomatic COVID-19 Virus (Coronavirus) by PCR; Future         See Patient Instructions    Return in about 1 week (around 10/27/2020), or if symptoms worsen or fail to improve.    JM Puentes [facility]      Video-Visit Details    Type of service:  Video Visit    Video End  Time:11:16 AM    Originating Location (pt. Location): Home    Distant Location (provider location):  [facility]     Platform used for Video Visit: Rain

## 2020-10-21 ENCOUNTER — OFFICE VISIT (OUTPATIENT)
Dept: FAMILY MEDICINE | Facility: CLINIC | Age: 30
End: 2020-10-21
Payer: COMMERCIAL

## 2020-10-21 DIAGNOSIS — Z20.822 ENCOUNTER FOR LABORATORY TESTING FOR COVID-19 VIRUS: ICD-10-CM

## 2020-10-21 DIAGNOSIS — R05.9 COUGH: ICD-10-CM

## 2020-10-21 PROCEDURE — 99207 PR NO CHARGE LOS: CPT

## 2020-10-21 PROCEDURE — U0003 INFECTIOUS AGENT DETECTION BY NUCLEIC ACID (DNA OR RNA); SEVERE ACUTE RESPIRATORY SYNDROME CORONAVIRUS 2 (SARS-COV-2) (CORONAVIRUS DISEASE [COVID-19]), AMPLIFIED PROBE TECHNIQUE, MAKING USE OF HIGH THROUGHPUT TECHNOLOGIES AS DESCRIBED BY CMS-2020-01-R: HCPCS | Performed by: NURSE PRACTITIONER

## 2020-10-22 LAB
SARS-COV-2 RNA SPEC QL NAA+PROBE: NOT DETECTED
SPECIMEN SOURCE: NORMAL

## 2020-11-23 ENCOUNTER — VIRTUAL VISIT (OUTPATIENT)
Dept: FAMILY MEDICINE | Facility: CLINIC | Age: 30
End: 2020-11-23
Payer: COMMERCIAL

## 2020-11-23 DIAGNOSIS — I10 ESSENTIAL HYPERTENSION: ICD-10-CM

## 2020-11-23 PROCEDURE — 99213 OFFICE O/P EST LOW 20 MIN: CPT | Mod: 95 | Performed by: NURSE PRACTITIONER

## 2020-11-23 RX ORDER — LISINOPRIL 20 MG/1
20 TABLET ORAL DAILY
Qty: 90 TABLET | Refills: 3 | Status: SHIPPED | OUTPATIENT
Start: 2020-11-23 | End: 2021-11-24

## 2020-11-23 RX ORDER — LISINOPRIL 10 MG/1
10 TABLET ORAL DAILY
Qty: 90 TABLET | Refills: 1 | Status: CANCELLED | OUTPATIENT
Start: 2020-11-23

## 2020-11-23 NOTE — PATIENT INSTRUCTIONS
Elevated blood pressure is defined as blood pressure greater then 140/80.   Continue to monitor your blood pressure  If it remains elevated follow-up with me.         Patient Education     Discharge Instructions for High Blood Pressure (Hypertension)  You have been diagnosed with high blood pressure (hypertension). This means the force of blood against your artery walls is too strong. It also means your heart is working hard to move blood. High blood pressure usually has no symptoms, but over time, it can cause serious health problems. High blood pressure raises your risk for heart attack, stroke, heart disease, heart failure, kidney disease, and vision loss. With help from your doctor, you can manage your blood pressure and protect your health.   Blood pressure measurements are given as 2 numbers. Systolic blood pressure is the upper number. This is the pressure when the heart contracts or pumps. Diastolic blood pressure is the lower number. This is the pressure when the heart relaxes between beats.   Blood pressure is categorized as normal, elevated, or stage 1 or stage 2 high blood pressure:     Normal blood pressure is systolic of less than 120 and diastolic of less than 80 (120/80) at rest    Elevated blood pressure is systolic of 120 to 129 and diastolic less than 80 at rest    Stage 1 high blood pressure is systolic is 130 to 139 or diastolic between 80 to 89 at rest    Stage 2 high blood pressure is when systolic is 140 or higher or the diastolic is 90 or higher at rest  Taking medicine    Learn to measure your own blood pressure. Keep a record of your results. Ask your doctor which readings mean that you need medical attention.    Take your blood pressure medicine exactly as directed. Don t skip doses. Missing doses can cause your blood pressure to get out of control.    If you do miss a dose (or doses) check with your healthcare provider about what to do.    Don't take medicines that contain heart  stimulants, including over-the-counter medicines. Check for warnings about high blood pressure on the label. Ask the pharmacist before purchasing something you haven't used before    Check with your doctor or pharmacist before taking a decongestant such as pseudoephedrine or phenylephrine. Some decongestants can worsen high blood pressure.    Lifestyle changes    Stay at a healthy weight. Get help to lose any extra pounds. Often times meeting with a dietitian can help you identify changes that can be made to your diet to help with weight loss.    Cut back on salt.  ? Limit canned, dried, packaged, and fast foods.  ? Don t add salt to your food at the table.  ? Season foods with herbs instead of salt when you cook.  ? Request no added salt when you go to a restaurant.  ? The American Heart Association (AHA) says the ideal amount of sodium is no more than 1,500 mg a day.  But because Americans eat so much salt, you can make a positive change by cutting back to even 2,300 mg of sodium a day (1 teaspoon).     Follow the DASH (Dietary Approaches to Stop Hypertension) eating plan. This plan recommends vegetables, fruits, whole gains, and other heart healthy foods.    Eat food rich in potassium.    Begin an exercise program. Ask your healthcare provider how to get started. The AHA recommends aerobic exercise 3 to 4 times a week for an average of 40 minutes at a time to lower blood pressure, with your provider's approval. Simple activities such as walking or gardening can help.    Break the smoking habit. Enroll in a stop-smoking program to improve your chances of success. Ask your healthcare provider about programs and medicines to help you stop smoking.    Limit drinks that contain caffeine such as coffee, black or green tea, and cola to 2 per day.    Never take stimulants such as amphetamines or cocaine. These drugs can be deadly for someone with high blood pressure.    Control your stress. Learn ways to manage  stress.    Limit alcohol to no more than 1 drink a day for women and 2 drinks a day for men.    Follow-up care  Make a follow-up appointment as directed.  When to call your healthcare provider  Call your healthcare provider right away if you have any of the following:    Chest pain or shortness of breath ( call 911)    Moderate to severe headache    Weakness in the muscles of your face, arms, or legs    Trouble speaking    Extreme drowsiness    Confusion    Fainting or dizziness    Pulsating or rushing sound in your ears    Unexplained nosebleed    Weakness, tingling, or numbness of your face, arms, or legs    Change in vision    Blood pressure measured at home that is greater than 180/110  [employer] last reviewed this educational content on 8/1/2019 2000-2020 [employer]. [address] [place] [address], [place] [address]. All rights reserved. This information is not intended as a substitute for professional medical care. Always follow your healthcare professional's instructions.

## 2020-11-23 NOTE — PROGRESS NOTES
"Rogelio Noel is a 30 year old male who is being evaluated via a billable video visit.      The patient has been notified of following:     \"This video visit will be conducted via a call between you and your physician/provider. We have found that certain health care needs can be provided without the need for an in-person physical exam.  This service lets us provide the care you need with a video conversation.  If a prescription is necessary we can send it directly to your pharmacy.  If lab work is needed we can place an order for that and you can then stop by our lab to have the test done at a later time.    Video visits are billed at different rates depending on your insurance coverage.  Please reach out to your insurance provider with any questions.    If during the course of the call the physician/provider feels a video visit is not appropriate, you will not be charged for this service.\"    Patient has given verbal consent for Video visit? Yes  How would you like to obtain your AVS? Mail a copy  If you are dropped from the video visit, the video invite should be resent to: Text to cell phone: [phone]  Will anyone else be joining your video visit? No      Subjective     Rogelio Noel is a 30 year old male who presents today via video visit for the following health issues:    HPI     Hypertension Follow-up      Do you check your blood pressure regularly outside of the clinic? Yes     Are you following a low salt diet? Yes    Are your blood pressures ever more than 140 on the top number (systolic) OR more   than 90 on the bottom number (diastolic), for example 140/90? Yes      How many servings of fruits and vegetables do you eat daily?  0-1    On average, how many sweetened beverages do you drink each day (Examples: soda, juice, sweet tea, etc.  Do NOT count diet or artificially sweetened beverages)?   0    How many days per week do you exercise enough to make your heart beat faster? 4    How many " minutes a day do you exercise enough to make your heart beat faster? 30 - 60    How many days per week do you miss taking your medication? 0         Video Start Time: 3:34 PM        Review of Systems   Constitutional, HEENT, cardiovascular, pulmonary, GI, , musculoskeletal, neuro, skin, endocrine and psych systems are negative, except as otherwise noted.      Objective           Vitals:  No vitals were obtained today due to virtual visit.    Physical Exam     GENERAL: Healthy, alert and no distress  EYES: Eyes grossly normal to inspection.  No discharge or erythema, or obvious scleral/conjunctival abnormalities.  RESP: No audible wheeze, cough, or visible cyanosis.  No visible retractions or increased work of breathing.    SKIN: Visible skin clear. No significant rash, abnormal pigmentation or lesions.  NEURO: Cranial nerves grossly intact.  Mentation and speech appropriate for age.  PSYCH: Mentation appears normal, affect normal/bright, judgement and insight intact, normal speech and appearance well-groomed.      No results found for any visits on 11/23/20.        Assessment & Plan     Essential hypertension  Uncontrolled will increase lisinopril to 20 mg and patient to monitor his blood pressure   - lisinopril (ZESTRIL) 20 MG tablet; Take 1 tablet (20 mg) by mouth daily          Return in about 1 year (around 11/23/2021).    JM Moses CNP  [facility]      Video-Visit Details    Type of service:  Video Visit    Video End Time:3:38 PM    Originating Location (pt. Location): Home    Distant Location (provider location):  [facility]     Platform used for Video Visit: Juan

## 2021-04-06 ENCOUNTER — OFFICE VISIT (OUTPATIENT)
Dept: FAMILY MEDICINE | Facility: CLINIC | Age: 31
End: 2021-04-06
Payer: COMMERCIAL

## 2021-04-06 VITALS
TEMPERATURE: 97 F | HEART RATE: 62 BPM | SYSTOLIC BLOOD PRESSURE: 124 MMHG | WEIGHT: 277 LBS | HEIGHT: 75 IN | DIASTOLIC BLOOD PRESSURE: 80 MMHG | RESPIRATION RATE: 14 BRPM | BODY MASS INDEX: 34.44 KG/M2

## 2021-04-06 DIAGNOSIS — M54.50 LEFT LOW BACK PAIN, UNSPECIFIED CHRONICITY, UNSPECIFIED WHETHER SCIATICA PRESENT: Primary | ICD-10-CM

## 2021-04-06 DIAGNOSIS — M79.18 GLUTEAL PAIN: ICD-10-CM

## 2021-04-06 PROCEDURE — 99214 OFFICE O/P EST MOD 30 MIN: CPT | Performed by: FAMILY MEDICINE

## 2021-04-06 ASSESSMENT — MIFFLIN-ST. JEOR: SCORE: 2302.09

## 2021-04-06 NOTE — PROGRESS NOTES
Assessment & Plan     Left low back pain, unspecified chronicity, unspecified whether sciatica present  History of lumbar radiculopathy, experiencing left gluteal pain, worse for last 2 weeks.  Physical examination as described.  Will repeat MRI lumbar spine considering previous history of lumbar radiculopathy/disc herniation and worsening symptoms.  Suggested ice/heat, over-the-counter analgesia and activity as tolerated.  Orthopedic referral placed for further review and recommendations.  All questions answered.  - MR Lumbar Spine w/o Contrast; Future  - Orthopedic & Spine  Referral; Future    Gluteal pain  As above  - MR Lumbar Spine w/o Contrast; Future  - Orthopedic & Spine  Referral; Future       Patient Instructions     Patient Education     Back Care Tips     Caring for your back  These are things you can do to prevent a recurrence of acute back pain and to reduce symptoms from chronic back pain:    Stay at a healthy weight. If you are overweight, losing weight will help most types of back pain.    Exercise is an important part of recovery from most types of back pain. The muscles behind and in front of the spine support the back. This means strengthening both the back muscles and the abdominal muscles will provide better support for your spine.     Swimming and brisk walking are good overall exercises to improve your fitness level.    Practice safe lifting methods (see below).    Practice good posture when sitting, standing, and walking. Don't sit for a long time. This puts more stress on the lower back than standing or walking.    Wear quality shoes with good arch support. Foot and ankle alignment can affect back symptoms. Don't wear high heels.    Therapeutic massage can help relax the back muscles without stretching them.    During the first 24 to 72 hours after an acute injury or flare-up of chronic back pain, put an ice pack on the painful area for 20 minutes and then remove it for  20 minutes. Do thisover a period of 60 to 90 minutes, or several times a day. As a safety precaution, don't use a heating pad at bedtime. Sleeping on a heating pad can lead to skin burns or tissue damage.    You can alternate using ice and heat.  Medicines  Talk with your healthcare provider before using medicines, especially if you have other health problems or are taking other medicines.    You may use over-the-counter medicines, such as acetaminophen, ibuprofen, or naprosyn to control pain, unless your healthcare provider prescribed other pain medicine. Talk with your healthcare provider before taking any medicines if you have a chronic condition such as diabetes, liver or kidney disease, stomach ulcers, or digestive bleeding, or are taking blood thinners.    Be careful if you are given prescription pain medicines, opioids, or medicine for muscle spasm. They can cause drowsiness, and affect your coordination, reflexes, and judgment. Don't drive or operate heavy machinery while taking these types of medicines. Take prescription pain medicine only as prescribed by your healthcare provider.  Lumbar stretch  This simple stretch will help relax muscle spasm and keep your back more limber. If exercise makes your back pain worse, don t do it.    Lie on your back with your knees bent and both feet on the ground.    Slowly raise your left knee to your chest as you flatten your lower back against the floor. Hold for 5 seconds.    Relax and repeat the exercise with your right knee.    Do 10 of these exercises for each leg.  Safe lifting method    Don t bend over at the waist to lift an object off the floor.  Instead, bend your knees and hips in a squat.     Keep your back and head upright    Hold the object close to your body, directly in front of you.    Straighten your legs to lift the object.     Lower the object to the floor in the reverse fashion.    If you must slide something across the floor, push it.    Posture  tips  Sitting  Sit in chairs with straight backs or low-back support. Keep your knees lower than your hips, with your feet flat on the floor.  When driving, sit up straight. Adjust the seat forward so you are not leaning toward the steering wheel.  A small pillow or rolled towel behind your lower back may help if you are driving long distances.   Standing  When standing for long periods, shift most of your weight to one leg at a time. Switch legs every few minutes.   Sleeping  The best way to sleep is on your side with your knees bent. Put a low pillow under your head to support your neck in a neutral spine position. Don't use thick pillows that bend your neck to one side. Put a pillow between your legs to further relax your lower back. If you sleep on your back, put pillows under your knees to support your legs in a slightly flexed position. Use a firm mattress. If your mattress sags, replace it, or use a 1/2-inch plywood board under the mattress to add support.  Follow-up care  Follow up with your healthcare provider, or as advised.  If X-rays, a CT scan or an MRI scan were taken, they may be reviewed by a radiologist. You will be told of any new findings that may affect your care.  Call 911  Call 911 if any of the following occur:    Trouble breathing    Confusion    Very drowsy    Fainting or loss of consciousness    Rapid or very slow heart rate    Loss of  bowel or bladder control  When to seek medical advice  Call your healthcare provider right away if any of the following occur:    Pain becomes worse or spreads to your arms or legs    Weakness or numbness in one or both arms or legs    Numbness in the groin area  [employer] last reviewed this educational content on 11/1/2019 2000-2020 [employer]. All rights reserved. This information is not intended as a substitute for professional medical care. Always follow your healthcare professional's instructions.             MD JENNIFER Cardenas  "[facility]    Stephanie Mercado is a 31 year old who presents for the following health issues     HPI     Back Pain  Onset/Duration: 2 months, worse for last 2 weeks  Description:   Location of pain: sharp left gluteus pain  Character of pain: sharp, dull ache, stabbing and gnawing  Pain radiation: none  New numbness or weakness in legs, not attributed to pain: no   Intensity:  At its worst 7-8/10  Progression of Symptoms: intermittent and waxing and waning  Worse in AM  History:   Specific cause: none  Pain interferes with job: no  History of back problems: yes  Any previous MRI or X-rays: yes  Sees a specialist for back pain: not in long time  Alleviating factors:   Improved by: activity     Precipitating factors:  Worsened by: in the AM  Therapies tried and outcome: none    Accompanying Signs & Symptoms:  Risk of Fracture: None  Risk of Cauda Equina: None  Risk of Infection: None  Risk of Cancer: None  Risk of Ankylosing Spondylitis: Onset at age <35, male, AND morning back stiffness  no     {    Review of Systems   Constitutional, HEENT, cardiovascular, pulmonary, GI, , musculoskeletal, neuro, skin, endocrine and psych systems are negative, except as otherwise noted.      Objective    /80   Pulse 62   Temp 97  F (36.1  C) (Tympanic)   Resp 14   Ht 1.905 m (6' 3\")   Wt 125.6 kg (277 lb)   BMI 34.62 kg/m    Body mass index is 34.62 kg/m .  Physical Exam   GENERAL: alert, no distress and obese  NECK: no adenopathy, no asymmetry, masses, or scars and thyroid normal to palpation  RESP: lungs clear to auscultation - no rales, rhonchi or wheezes  CV: regular rates and rhythm, normal S1 S2, no S3 or S4 and no murmur, click or rub  ABDOMEN: soft, nontender  MS: subjective left gluteal pain, no spinal/paraspinal tenderness, skin discoloration, swelling noted, SLR negative bilaterally lower extremity strength, pedal pulses 3+  SKIN: no suspicious lesions or rashes  NEURO: Normal " strength and tone, mentation intact and speech normal  PSYCH: mentation appears normal, affect normal/bright

## 2021-04-06 NOTE — NURSING NOTE
"Chief Complaint   Patient presents with     Back Pain       Initial /80   Pulse 62   Temp 97  F (36.1  C) (Tympanic)   Resp 14   Ht 1.905 m (6' 3\")   Wt 125.6 kg (277 lb)   BMI 34.62 kg/m   Estimated body mass index is 34.62 kg/m  as calculated from the following:    Height as of this encounter: 1.905 m (6' 3\").    Weight as of this encounter: 125.6 kg (277 lb).    Patient presents to the clinic using     Health Maintenance that is potentially due pending provider review:          Is there anyone who you would like to be able to receive your results?   If yes have patient fill out BILLY    "

## 2021-04-06 NOTE — PATIENT INSTRUCTIONS
Patient Education     Back Care Tips     Caring for your back  These are things you can do to prevent a recurrence of acute back pain and to reduce symptoms from chronic back pain:    Stay at a healthy weight. If you are overweight, losing weight will help most types of back pain.    Exercise is an important part of recovery from most types of back pain. The muscles behind and in front of the spine support the back. This means strengthening both the back muscles and the abdominal muscles will provide better support for your spine.     Swimming and brisk walking are good overall exercises to improve your fitness level.    Practice safe lifting methods (see below).    Practice good posture when sitting, standing, and walking. Don't sit for a long time. This puts more stress on the lower back than standing or walking.    Wear quality shoes with good arch support. Foot and ankle alignment can affect back symptoms. Don't wear high heels.    Therapeutic massage can help relax the back muscles without stretching them.    During the first 24 to 72 hours after an acute injury or flare-up of chronic back pain, put an ice pack on the painful area for 20 minutes and then remove it for 20 minutes. Do thisover a period of 60 to 90 minutes, or several times a day. As a safety precaution, don't use a heating pad at bedtime. Sleeping on a heating pad can lead to skin burns or tissue damage.    You can alternate using ice and heat.  Medicines  Talk with your healthcare provider before using medicines, especially if you have other health problems or are taking other medicines.    You may use over-the-counter medicines, such as acetaminophen, ibuprofen, or naprosyn to control pain, unless your healthcare provider prescribed other pain medicine. Talk with your healthcare provider before taking any medicines if you have a chronic condition such as diabetes, liver or kidney disease, stomach ulcers, or digestive bleeding, or are taking  blood thinners.    Be careful if you are given prescription pain medicines, opioids, or medicine for muscle spasm. They can cause drowsiness, and affect your coordination, reflexes, and judgment. Don't drive or operate heavy machinery while taking these types of medicines. Take prescription pain medicine only as prescribed by your healthcare provider.  Lumbar stretch  This simple stretch will help relax muscle spasm and keep your back more limber. If exercise makes your back pain worse, don t do it.    Lie on your back with your knees bent and both feet on the ground.    Slowly raise your left knee to your chest as you flatten your lower back against the floor. Hold for 5 seconds.    Relax and repeat the exercise with your right knee.    Do 10 of these exercises for each leg.  Safe lifting method    Don t bend over at the waist to lift an object off the floor.  Instead, bend your knees and hips in a squat.     Keep your back and head upright    Hold the object close to your body, directly in front of you.    Straighten your legs to lift the object.     Lower the object to the floor in the reverse fashion.    If you must slide something across the floor, push it.    Posture tips  Sitting  Sit in chairs with straight backs or low-back support. Keep your knees lower than your hips, with your feet flat on the floor.  When driving, sit up straight. Adjust the seat forward so you are not leaning toward the steering wheel.  A small pillow or rolled towel behind your lower back may help if you are driving long distances.   Standing  When standing for long periods, shift most of your weight to one leg at a time. Switch legs every few minutes.   Sleeping  The best way to sleep is on your side with your knees bent. Put a low pillow under your head to support your neck in a neutral spine position. Don't use thick pillows that bend your neck to one side. Put a pillow between your legs to further relax your lower back. If you  sleep on your back, put pillows under your knees to support your legs in a slightly flexed position. Use a firm mattress. If your mattress sags, replace it, or use a 1/2-inch plywood board under the mattress to add support.  Follow-up care  Follow up with your healthcare provider, or as advised.  If X-rays, a CT scan or an MRI scan were taken, they may be reviewed by a radiologist. You will be told of any new findings that may affect your care.  Call 911  Call 911 if any of the following occur:    Trouble breathing    Confusion    Very drowsy    Fainting or loss of consciousness    Rapid or very slow heart rate    Loss of  bowel or bladder control  When to seek medical advice  Call your healthcare provider right away if any of the following occur:    Pain becomes worse or spreads to your arms or legs    Weakness or numbness in one or both arms or legs    Numbness in the groin area  [employer] last reviewed this educational content on 11/1/2019 2000-2020 [employer]. All rights reserved. This information is not intended as a substitute for professional medical care. Always follow your healthcare professional's instructions.

## 2021-04-07 ENCOUNTER — HOSPITAL ENCOUNTER (OUTPATIENT)
Dept: MRI IMAGING | Facility: CLINIC | Age: 31
Discharge: HOME | End: 2021-04-07
Attending: FAMILY MEDICINE | Admitting: FAMILY MEDICINE
Payer: COMMERCIAL

## 2021-04-07 DIAGNOSIS — M54.50 LEFT LOW BACK PAIN, UNSPECIFIED CHRONICITY, UNSPECIFIED WHETHER SCIATICA PRESENT: ICD-10-CM

## 2021-04-07 DIAGNOSIS — M79.18 GLUTEAL PAIN: ICD-10-CM

## 2021-04-07 PROCEDURE — 72148 MRI LUMBAR SPINE W/O DYE: CPT

## 2021-04-19 ENCOUNTER — OFFICE VISIT (OUTPATIENT)
Dept: PALLIATIVE MEDICINE | Facility: CLINIC | Age: 31
End: 2021-04-19
Payer: COMMERCIAL

## 2021-04-19 VITALS — DIASTOLIC BLOOD PRESSURE: 90 MMHG | SYSTOLIC BLOOD PRESSURE: 147 MMHG | HEART RATE: 84 BPM

## 2021-04-19 DIAGNOSIS — M54.16 LUMBAR RADICULOPATHY: Primary | ICD-10-CM

## 2021-04-19 PROCEDURE — 99204 OFFICE O/P NEW MOD 45 MIN: CPT | Performed by: STUDENT IN AN ORGANIZED HEALTH CARE EDUCATION/TRAINING PROGRAM

## 2021-04-19 ASSESSMENT — PAIN SCALES - GENERAL: PAINLEVEL: NO PAIN (0)

## 2021-04-19 NOTE — PROGRESS NOTES
"Rogelio Noel  :  1990  DOS: 2021  MRN: [id number]    Medical Spine Clinic Visit    PCP: Dr. Ellis     Rogelio Noel is a 31 year old male with a history of HTN referred by Jagjit Ellis for: low back pain    He has a history of chronic mid and low back pain.  However, he now has a 2-month history of left-sided buttock pain.  This seemed to start after he and his wife got a new car.  His wife states that the car is small and perhaps sitting in the small car exacerbates his pain.  The pain is in the left buttock and can radiate down the left posterior thigh.  He has substantial pain first thing in the morning.  It is a sharp shooting pain that is present every morning.  Prolonged sitting is also exacerbating.  He has no numbness but can have tingling in his left buttock in the same distribution of his pain first thing in the morning.  He at times he feels his entire left leg is weak and \"goes out from underneath him\".  He thinks this is primarily due to pain rather than true weakness.  For his chronic back pain any kind of physical activity is helpful.  Formerly he was doing a lot of yoga, which is helpful.  Now he is playing hockey several nights a week.      Other evaluation and/or treatments so far consists of:   Weight lifting as a teenager -no longer enjoys this so stopped doing it  Yoga  Hockey  Beach body w/o    Doesn't like taking any medication      Current pain medications:   - ibuprofen 600mg daily - H    Other pertinent medications:  -None    Anticoagulants:  -None    Injections:   -None    Social History: Lives in [place] with wife Camelia.  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    Medical history:  Chronic back pain as above    Family History   Problem " Relation Age of Onset     Cancer Mother      Diabetes Mother      Hypertension Mother      Diabetes Father      Hypertension Father      Hypertension Brother        Objective  BP (!) 147/90   Pulse 84       General: healthy, alert and in no distress      HEENT: no scleral icterus or conjunctival erythema     Skin: no suspicious lesions or rash. No jaundice.     Resp: normal respiratory effort without conversational dyspnea     Psych: normal mood and affect      Gait: nonantalgic, appropriate coordination and balance     Neuro: normal light touch sensory exam of the extremities. Motor strength as noted below     Low back exam:    THORACIC/LUMBAR SPINE  Inspection:    No redness, swelling, overlying skin change, gross deformity/asymmetry  Palpation:    Tender about the left para lumbar muscles and right para lumbar muscles. Otherwise remainder of landmarks are nontender.  Range of Motion:     Lumbar flexion show full range    Lumbar extension show full range    Right rotation show full range    Left rotation show full range  Strength:    able to heel walk, able to toe walk, 5/5 - quadriceps, hamstrings, tibialis anterior, gastrocsoleus, and extensor hallicus longus  Special Tests:    Positive: LEFT femoral stretch test (left)  Negative: BILATERAL straight leg raise (bilateral), slump test (bilateral), facet compression test (low lumbar bilaterally), SI joint compression (bilateral), KI (bilateral), FADIR (bilateral)    Reflexes:       patellar (L3, L4) symmetric normal       achilles tendons (S1) symmetric normal    Sensation:      grossly intact throughout lower extremities    Skin:       well perfused       capillary refill brisk    Radiology:  MRI LUMBAR SPINE WITHOUT CONTRAST   4/7/2021   L4-L5: Normal disc height and signal. Small disc bulge slightly  eccentric to the left. Mild, left greater than right, lateral recess  stenosis with contact of both the ventral and dorsal margins of the  traversing, left  greater than right, L5 nerve roots but no nerve root  displacement or overt compression. No central spinal canal stenosis.  Mild/mild-to-moderate left and mild right neural foraminal stenosis.     L5-S1: Disc desiccation with mild disc height loss. Diffuse disc bulge  slightly eccentric to the left with probable superimposed central disc  protrusion component. Mild--to-moderate facet arthropathy. Moderate  left lateral recess stenosis, including mild mass effect and slight  asymmetric posterior displacement of the traversing left S1 nerve root  (series 5 image 43) mild right lateral recess stenosis. No central  spinal canal stenosis. Mild left and minimal right neural foraminal  stenosis.    IMPRESSION:  1. Multilevel degenerative findings in the lumbar spine superimposed  on developmental upper to mid lumbar spinal canal stenosis, as  described.  2. Multilevel spinal canal stenosis, moderate in degree at L3-L4 and  mild-to-moderate/moderate degree at L1-L2, and mild/mild-to-moderate  in degree at L2-L3. Varying degrees of multilevel lateral recess  stenosis, as described.  3. Mild/mild-to-moderate multilevel neural foraminal stenosis.  Assessment:  1. Lumbar radiculopathy      Left buttock and posterior thigh pain consistent with L5/S1 left eccentric disc bulge and left lateral recess stenosis.     Plan:  Discussed the assessment with the patient.  -Consider gabapentin in the future.  He would like to avoid systemic medications at this time, which I think is quite reasonable.  Epidural Steroid injection done at [facility] -left L5 TFESI  Follow up: As needed, at least 2 weeks post injection    BILLING TIME DOCUMENTATION:   The total TIME spent on this patient on the date of the encounter/appointment was 45 minutes.      TOTAL TIME includes:   Time spent preparing to see the patient (reviewing records and tests) - 5 min  Time spent face to face (or over the phone) with the patient - 29 min  Time spent  ordering tests, medications, procedures and referrals - 1 min  Time spent Referring and communicating with other healthcare professionals - 2 min  Time spent documenting clinical information in Epic - 8 min     Pennie Clark MD  [facility]     Disclaimer: This note consists of symbols derived from keyboarding, dictation and/or voice recognition software. As a result, there may be errors in the script that have gone undetected. Please consider this when interpreting information found in this chart.

## 2021-04-19 NOTE — PATIENT INSTRUCTIONS
I think your left buttock pain is caused by a disc protrustion irritating your nerve.   This is what we will do for it:     1. Schedule a low back corticosteroid injection with me. You will be called for this.     Follow up: as needed, at least 2 weeks post injection    Pennie Clark MD  [facility]    ----------------------------------------------------------------  Clinic Number:  [phone]     Call with any questions about your care and for scheduling assistance.     Calls are returned Monday through Friday between 8 AM and 4:30 PM. We usually get back to you within 2 business days depending on the issue/request.    If we are prescribing your medications:    For opioid medication refills, call the clinic or send a saambaa message 7 days in advance.  Please include:    Name of requested medication    Name of the pharmacy.    For non-opioid medications, call your pharmacy directly to request a refill. Please allow 3-4 days to be processed.     Per [place] State Law:    All controlled substance prescriptions must be filled within 30 days of being written.      For those controlled substances allowing refills, pickup must occur within 30 days of last fill.      We believe regular attendance is key to your success in our program!      Any time you are unable to keep your appointment we ask that you call us at least 24 hours in advance to cancel.This will allow us to offer the appointment time to another patient.     Multiple missed appointments may lead to dismissal from the clinic.

## 2021-04-20 ENCOUNTER — TELEPHONE (OUTPATIENT)
Dept: PALLIATIVE MEDICINE | Facility: CLINIC | Age: 31
End: 2021-04-20

## 2021-04-20 DIAGNOSIS — Z20.822 ENCOUNTER FOR LABORATORY TESTING FOR COVID-19 VIRUS: Primary | ICD-10-CM

## 2021-04-20 NOTE — TELEPHONE ENCOUNTER
Screening Questions for Radiology Injections:    Injection to be done at which interventional clinic site? [facility]    If [facility] location, tell patient that this procedure requires a COVID-19 lab test be done within 4 days of the procedure. Would you still like to move forward with scheduling the procedure?  YES:    If YES, let patient know that someone will call them to schedule the COVID-19 test and that they will only receive a call back if the result is positive. Route to nursing to enter order.     Instruct patient to arrive as directed prior to the scheduled appointment time:    [place] minutes before      [place]: 30 minutes before; if IV needed 1 hour before     Procedure ordered by Eduardo    Procedure ordered? Left L5 TFESI      Transforaminal Cervical CÉSAR - no pain provider currently performing    As a reminder, receiving steroids can decrease your body's ability to fight infection.   Would you still like to move forward with scheduling the injection?  Yes    What insurance would patient like us to bill for this procedure? [employer]      Worker's comp or MVA (motor vehicle accident) -Any injection DO NOT SCHEDULE and route to Radha Richardson.      [employer] insurance - For SI joint injections, DO NOT SCHEDULE and route Radha Richardson.       ALL BCBS, [employer] and [employer]-Route to Radha for review DO NOT SCHEDULE      IF SCHEDULING IN [place] AND NEEDS A PA, IT IS OKAY TO SCHEDULE. [place] HANDLES THEIR OWN PA'S AFTER THE PATIENT IS SCHEDULED. PLEASE SCHEDULE AT LEAST 1 WEEK OUT SO A PA CAN BE OBTAINED.    Any chance of pregnancy? Not Applicable   If YES, do NOT schedule and route to RN Price    Is an  needed? No     Patient has a drive home? (mandatory) YES: INFORMED    Is patient taking any blood thinners (i.e. plavix, coumadin, jantoven, warfarin, heparin, pradaxa or dabigatran, etc)? No   If hold needed, do NOT schedule, route to RN pool     Is patient taking  any aspirin products (includes Excedrin and Fiorinal)? No     If more than 325mg/day, OK to schedule; Instruct pt to decrease to less than 325 mg for 7 days AND route to RN pool    For CERVICAL procedures, hold all aspirin products for 6 days.     Tell pt that if aspirin product is not held for 6 days, the procedure WILL BE cancelled.      Does the patient have a bleeding or clotting disorder? No     If YES, okay to schedule AND route to RN nurse pool    For any patients with platelet count <100, must be forwarded to provider    Is patient diabetic?  No  If YES, instruct them to bring their glucometer.    Does patient have an active infection or treated for one within the past week? No     Is patient currently taking any antibiotics?  No     For patients on chronic, preventative, or prophylactic antibiotics, procedures may be scheduled.     For patients on antibiotics for active or recent infection:antibiotic course must have been completed for 4 days    Is patient currently taking any steroid medications? (i.e. Prednisone, Medrol)  No     For patients on steroid medications, course must have been completed for 4 days    Is patient actively being treated for cancer or immunocompromised? No  If YES, do NOT schedule and route to RN pool     Are you able to get on and off an exam table with minimal or no assistance? Yes  If NO, do NOT schedule and route to RN pool    Are you able to roll over and lay on your stomach with minimal or no assistance? Yes  If NO, do NOT schedule and route to RN pool     Any allergies to contrast dye, iodine, shellfish, or numbing and steroid medications? No  If YES, route to RN pool AND add allergy information to appointment notes    Allergies: Patient has no known allergies.      Has the patient had a flu shot or any other vaccinations within 7 days before or after the procedure.  No     Have you recently had a COVID vaccine or have plans to get it in the near future? No    If yes, explain  that for the vaccine to work best they need to:       wait 1 week before and 1 week after getting Vaccine #1    wait 1 week before and 2 weeks after getting Vaccine #2    If patient has concerns about the timing, send to RN pool     Does patient have an MRI/CT?  YES: 2021  Check Procedure Scheduling Grid to see if required.      Was the MRI done within the last 3 years?  Yes    If yes, where was the MRI done i.e.[facility], [facility], [place], [facility], [facility] etc? [facility]      If no, do not schedule and route to RN pool    If MRI was not done at [place], [facility] or [facility] do NOT schedule and route to RN pool.      If pt has an imaging disc, the injection MAY be scheduled but pt has to bring disc to appt.     If they show up without the disc the injection cannot be done    Procedure Specific Instructions:      If celiac plexus block, informed patient NPO for 6 hours and that it is okay to take medications with sips of water, especially blood pressure medications  Not Applicable         If this is for a cervical procedure, informed patient that aspirin needs to be held for 6 days.   Not Applicable      If IV needed:    Do not schedule procedures requiring IV placement in the first appointment of the day or first appointment after lunch. Do NOT schedule at 0745, 0815 or 1245.     Instructed pt to arrive 30 minutes early for IV start if required. (Check Procedure Scheduling Grid)  Not Applicable    Reminders:      If you are started on any steroids or antibiotics between now and your appointment, you must contact us because the procedure may need to be cancelled.  Yes      For all procedures except radiofrequency ablations (RFAs) and spinal cord stimulator (SCS) trials, informed patient:    IV sedation is not provided for this procedure.  If you feel that an oral anti-anxiety medication is needed, you can discuss this further with your referring provider or primary care provider.  The Pain Clinic provider will  discuss specifics of what the procedure includes at your appointment.  Most procedures last 10-20 minutes.  We use numbing medications to help with any discomfort during the procedure.  Not Applicable      For patients 85 or older we recommend having an adult stay w/ them for the remainder of the day.       Does the patient have any questions?  NO  Deepthi Ocasio  [facility]

## 2021-04-20 NOTE — TELEPHONE ENCOUNTER
Covid test ordered     Garrett Michel, RN  Care Coordinator   [facility] [place]

## 2021-04-22 ENCOUNTER — MYC MEDICAL ADVICE (OUTPATIENT)
Dept: PALLIATIVE MEDICINE | Facility: CLINIC | Age: 31
End: 2021-04-22

## 2021-04-22 NOTE — TELEPHONE ENCOUNTER
"Joel interianog to Pt    Satinder Mercado,      I have one from [employer] that works for me but I am 5'8\". I think it would be too short for you.   I have no direct experience with the company, but I have heard good things about [employer] - [employer]     Pennie Clark MD  [facility]  "

## 2021-04-22 NOTE — TELEPHONE ENCOUNTER
Per patient myChart message:  From: Rogelio Noel      Created: 4/22/2021 12:47 PM      Thank you.      Do you have recommendation for a certain type of desk? I don't think most of them (that I've seen) would work for me due to my height.         Routed to provider.     Eveline RN-BSN  [facility]

## 2021-04-26 DIAGNOSIS — Z20.822 ENCOUNTER FOR LABORATORY TESTING FOR COVID-19 VIRUS: ICD-10-CM

## 2021-04-26 LAB
SARS-COV-2 RNA RESP QL NAA+PROBE: NORMAL
SPECIMEN SOURCE: NORMAL

## 2021-04-26 PROCEDURE — U0005 INFEC AGEN DETEC AMPLI PROBE: HCPCS | Performed by: STUDENT IN AN ORGANIZED HEALTH CARE EDUCATION/TRAINING PROGRAM

## 2021-04-26 PROCEDURE — U0003 INFECTIOUS AGENT DETECTION BY NUCLEIC ACID (DNA OR RNA); SEVERE ACUTE RESPIRATORY SYNDROME CORONAVIRUS 2 (SARS-COV-2) (CORONAVIRUS DISEASE [COVID-19]), AMPLIFIED PROBE TECHNIQUE, MAKING USE OF HIGH THROUGHPUT TECHNOLOGIES AS DESCRIBED BY CMS-2020-01-R: HCPCS | Performed by: STUDENT IN AN ORGANIZED HEALTH CARE EDUCATION/TRAINING PROGRAM

## 2021-04-27 LAB
LABORATORY COMMENT REPORT: NORMAL
SARS-COV-2 RNA RESP QL NAA+PROBE: NEGATIVE
SPECIMEN SOURCE: NORMAL

## 2021-04-28 NOTE — PROGRESS NOTES
"Pre procedure Diagnosis: lumbar radiculopathy   Post procedure Diagnosis: Same  Procedure performed: Left L5 transforaminal epidural steroid injection   Anesthesia: none  Complications: none  Operators: Pennie Clark MD     Needle: 25g 5\"  Injectate: 1ml 0.25% bupivacaine, 10mg of dexamethasone    Indications:   Rogelio Noel is a 31 year old male was sent by me for left L5 TFESI.  he has a 2 month history of left low back, buttock and posterior thigh pain.  Exam shows positive left femoral stretch test and he has tried conservative treatment including yoga and other activities.    MRI was done on 4/7/2021 which showed   L5-S1: Disc desiccation with mild disc height loss. Diffuse disc bulge slightly eccentric to the left with probable superimposed central disc protrusion component. Mild--to-moderate facet arthropathy. Moderate left lateral recess stenosis, including mild mass effect and slight asymmetric posterior displacement of the traversing left S1 nerve root (series 5 image 43) mild right lateral recess stenosis. No central spinal canal stenosis. Mild left and minimal right neural foraminal Stenosis.    Options/alternatives, benefits and risks were discussed with the patient including bleeding, infection, tissue trauma, numbness, weakness, paralysis, spinal cord injury, radiation exposure, headache and reaction to medications. Questions were answered to his satisfaction and he agrees to proceed. Voluntary informed consent was obtained and signed.     Vitals were reviewed: Yes  Allergies were reviewed:  Yes   Medications were reviewed:  Yes   Pre-procedure pain score: 7/10    Procedure:  After getting informed consent, patient was brought into the procedure suite and was placed in a prone position on the procedure table.   A Pause for the Cause was performed.  Patient was prepped and draped in sterile fashion.     After identifying the left L5 neuroforamen, the C-arm was rotated to a left lateral oblique " angle. A 25 gauge 5 inch spinal needle was placed coaxial with the fluoroscopy beam and overriding the superior aspect of the neuroforamen. The spinal needle was advanced under intermittent fluoroscopy until it entered the foramen superiorly.    The position was then inspected from anteroposterior and lateral views, and the needle adjusted appropriately.  A total of 1ml of Omnipaque-180 was injected, confirming appropriate position, with spread into the epidural space, with no intravascular uptake. 9ml was wasted    1ml of 0.25% bupivacaine and 10mg of dexamethasone were injected from separate syringes.  The needle was flushed with lidocaine and removed.    During the procedure, there was not a paresthesia.   Hemostasis was achieved, the area was cleaned, and bandaids were placed when appropriate.  The patient tolerated the procedure well, and was taken to the recovery room.    Images were saved to PACS.    Post-procedure pain score: 1/10  Follow-up includes:   -f/u with referring provider    Pennie Clark MD  [facility]

## 2021-04-29 ENCOUNTER — HOSPITAL ENCOUNTER (OUTPATIENT)
Dept: PALLIATIVE MEDICINE | Facility: CLINIC | Age: 31
Discharge: HOME | End: 2021-04-29
Attending: FAMILY MEDICINE | Admitting: FAMILY MEDICINE
Payer: COMMERCIAL

## 2021-04-29 VITALS
HEART RATE: 79 BPM | TEMPERATURE: 96.7 F | SYSTOLIC BLOOD PRESSURE: 158 MMHG | DIASTOLIC BLOOD PRESSURE: 96 MMHG | RESPIRATION RATE: 16 BRPM

## 2021-04-29 DIAGNOSIS — M54.16 LUMBAR RADICULOPATHY: ICD-10-CM

## 2021-04-29 PROCEDURE — 255N000002 HC RX 255 OP 636: Performed by: STUDENT IN AN ORGANIZED HEALTH CARE EDUCATION/TRAINING PROGRAM

## 2021-04-29 PROCEDURE — 250N000011 HC RX IP 250 OP 636: Performed by: STUDENT IN AN ORGANIZED HEALTH CARE EDUCATION/TRAINING PROGRAM

## 2021-04-29 PROCEDURE — 64483 NJX AA&/STRD TFRM EPI L/S 1: CPT | Mod: LT | Performed by: STUDENT IN AN ORGANIZED HEALTH CARE EDUCATION/TRAINING PROGRAM

## 2021-04-29 RX ORDER — IOPAMIDOL 408 MG/ML
10 INJECTION, SOLUTION INTRATHECAL ONCE
Status: COMPLETED | OUTPATIENT
Start: 2021-04-29 | End: 2021-04-29

## 2021-04-29 RX ORDER — BUPIVACAINE HYDROCHLORIDE 2.5 MG/ML
10 INJECTION, SOLUTION INFILTRATION; PERINEURAL ONCE
Status: COMPLETED | OUTPATIENT
Start: 2021-04-29 | End: 2021-04-29

## 2021-04-29 RX ORDER — DEXAMETHASONE SODIUM PHOSPHATE 10 MG/ML
10 INJECTION, SOLUTION INTRAMUSCULAR; INTRAVENOUS ONCE
Status: COMPLETED | OUTPATIENT
Start: 2021-04-29 | End: 2021-04-29

## 2021-04-29 RX ADMIN — DEXAMETHASONE SODIUM PHOSPHATE 10 MG: 10 INJECTION, SOLUTION INTRAMUSCULAR; INTRAVENOUS at 08:03

## 2021-04-29 RX ADMIN — BUPIVACAINE HYDROCHLORIDE 1 ML: 2.5 INJECTION, SOLUTION EPIDURAL; INFILTRATION; INTRACAUDAL; PERINEURAL at 08:03

## 2021-04-29 RX ADMIN — IOPAMIDOL 1 ML: 408 INJECTION, SOLUTION INTRATHECAL at 08:03

## 2021-04-29 NOTE — IP AVS SNAPSHOT
After Visit Summary Template Not Found    This Print Group is only intended to be used in the After Visit Summary and can only be used in a report that uses a released After Visit Summary Template.                       MRN:[id number]                      After Visit Summary   4/29/2021    Rogelio Noel    MRN: [id number]           Visit Information        Provider Department      4/29/2021  7:45 AM Pennie Clark MD; [address] [facility] [place]           Review of your medicines      UNREVIEWED medicines. Ask your doctor about these medicines       Dose / Directions   lisinopril 20 MG tablet  Commonly known as: ZESTRIL  Used for: Essential hypertension      Dose: 20 mg  Take 1 tablet (20 mg) by mouth daily  Quantity: 90 tablet  Refills: 3        CONTINUE these medicines which have NOT CHANGED       Dose / Directions   order for DME  Used for: Pain in joint involving ankle and foot, right, Posterior calcaneal spur of right foot      Equipment being ordered: quarter inch heel lift  Quantity: 1 Units  Refills: 0              Protect others around you: Learn how to safely use, store and throw away your medicines at www.disposemymeds.org.       Follow-ups after your visit       Care Instructions       Further instructions from your care team       [facility]   Procedure Discharge Instructions    Today you saw:    Dr. Pennie Clark    You had a:  Left L5 Transforaminal Epidural Steroid Injection    Medications used:  Bupivacaine   Dexamethasone   Isovue          Be cautious when walking. Numbness and/or weakness in the lower extremities may occur for up to 6-8 hours after the procedure due to effect of the local anesthetic    Do not drive for 6 hours. The effect of the local anesthetic could slow your reflexes.     You may resume your regular activities after 24 hours    Avoid strenuous activity for the first 24 hours    You may shower, however avoid  swimming, tub baths or hot tubs for 24 hours following your procedure    You may have a mild to moderate increase in pain for several days following the injection.    It may take up to 14 days for the steroid medication to start working although you may feel the effect as early as a few days after the procedure.       You may use ice packs for 10-15 minutes, 3 to 4 times a day at the injection site for comfort    Do not use heat to painful areas for 6 to 8 hours. This will give the local anesthetic time to wear off and prevent you from accidentally burning your skin.     Unless you have been directed to avoid the use of anti-inflammatory medications (NSAIDS), you may use medications such as ibuprofen, Aleve or Tylenol for pain control if needed.     If you were fasting, you may resume your normal diet and medications after the procedure    Possible side effects of steroids that you may experience include flushing, elevated blood pressure, increased appetite, mild headaches and restlessness.  All of these symptoms will get better with time.    If you experience any of the following, call the Pain Clinic during work hours (Mon-Friday 8-4:30 pm) at [phone] or the Provider Line after hours at [phone]:  -Fever over 100 degree F  -Swelling, bleeding, redness, drainage, warmth at the injection site  -Progressive weakness or numbness in your legs  -Loss of bowel or bladder function  -Unusual headache that is not relieved by Tylenol or other pain reliever  -Unusual new onset of pain that is not improving          Additional Information About Your Visit       howsimple Information    howsimple gives you secure access to your electronic health record. If you see a primary care provider, you can also send messages to your care team and make appointments. If you have questions, please call your primary care clinic.  If you do not have a primary care provider, please call [phone] and they will assist you.       Care  EveryWhere ID    This is your Care EveryWhere ID. This could be used by other organizations to access your [place] medical records  [id number]       Your Vitals Were  Most recent update: 4/29/2021  8:07 AM    Blood Pressure   150/93      Pulse   74    Temperature   96.7  F (35.9  C) (Tympanic)    Respirations   16           Primary Care Provider Fax #    Physician No Ref-Primary [phone]      Equal Access to Services    MAURICIOTICO JAHAIRA : Hadii aad ku hadasho Soomaali, waaxda luqadaha, qaybta kaalmada adeegyada, waxay idiin hayaan adeeg khluz lamichellen ah. So [facility] [phone].    ATENCIÓN: Si habla español, tiene a sandoval disposición servicios gratuitos de asistencia lingüística. Llame al [phone].    We comply with applicable federal and state civil rights laws, including the [place] Human Rights Act. We do not discriminate on the basis of race, color, creed, Mormon, national origin, marital status, age, disability, sex, sexual orientation, or gender identity.    If you would like an itemization of your charges they will now be available in [employer] 30 days after discharge. To access the itemized statements in [employer] go to billing/billing summary. From there select view account. There will be multiple tabs showing an overview of your account, detail, payments, and communications. From the communications tab you can see your monthly statements, your itemized statements, and any billing letters generated for your account. If you do not have a [employer] account and need help getting access please contact [employer] support at [phone].  If you would prefer to have your itemized statements mailed please contact our automated itemized bill request line at [phone] option  2.       Thank you!    Thank you for choosing [place] for your care. Our goal is always to provide you with excellent care. Hearing back from our patients is one way we can continue to improve our services. Please take a few minutes to  complete the written survey that you may receive in the mail after you visit with us. Thank you!            Medication List      Medications          Morning Afternoon Evening Bedtime As Needed    order for DME  INSTRUCTIONS: Equipment being ordered: quarter inch heel lift                       ASK your doctor about these medications          Morning Afternoon Evening Bedtime As Needed    lisinopril 20 MG tablet  Also known as: ZESTRIL  INSTRUCTIONS: Take 1 tablet (20 mg) by mouth daily

## 2021-04-29 NOTE — DISCHARGE INSTRUCTIONS
[facility]   Procedure Discharge Instructions    Today you saw:    Dr. Pennie Clark    You had a:  Left L5 Transforaminal Epidural Steroid Injection    Medications used:  Bupivacaine   Dexamethasone   Isovue          Be cautious when walking. Numbness and/or weakness in the lower extremities may occur for up to 6-8 hours after the procedure due to effect of the local anesthetic    Do not drive for 6 hours. The effect of the local anesthetic could slow your reflexes.     You may resume your regular activities after 24 hours    Avoid strenuous activity for the first 24 hours    You may shower, however avoid swimming, tub baths or hot tubs for 24 hours following your procedure    You may have a mild to moderate increase in pain for several days following the injection.    It may take up to 14 days for the steroid medication to start working although you may feel the effect as early as a few days after the procedure.       You may use ice packs for 10-15 minutes, 3 to 4 times a day at the injection site for comfort    Do not use heat to painful areas for 6 to 8 hours. This will give the local anesthetic time to wear off and prevent you from accidentally burning your skin.     Unless you have been directed to avoid the use of anti-inflammatory medications (NSAIDS), you may use medications such as ibuprofen, Aleve or Tylenol for pain control if needed.     If you were fasting, you may resume your normal diet and medications after the procedure    Possible side effects of steroids that you may experience include flushing, elevated blood pressure, increased appetite, mild headaches and restlessness.  All of these symptoms will get better with time.    If you experience any of the following, call the Pain Clinic during work hours (Mon-Friday 8-4:30 pm) at [phone] or the Provider Line after hours at [phone]:  -Fever over 100 degree F  -Swelling, bleeding, redness, drainage, warmth  at the injection site  -Progressive weakness or numbness in your legs  -Loss of bowel or bladder function  -Unusual headache that is not relieved by Tylenol or other pain reliever  -Unusual new onset of pain that is not improving

## 2021-04-29 NOTE — PROGRESS NOTES
Pre-procedure Intake    Have you been fasting? No     If yes, for how long?     Are you taking a prescribed blood thinner such as coumadin, Plavix, Xarelto?    No    If yes, when did you take your last dose?     Do you take aspirin?  No    If cervical procedure, have you held aspirin for 6 days?   NA    Do you have any allergies to contrast dye, iodine, steroid and/or numbing medications?  NO    Are you currently taking antibiotics or have an active infection?  NO    Have you had a fever/elevated temperature within the past week? NO    Are you currently taking oral steroids? NO    Do you have a ? Yes       Are you pregnant or breastfeeding?  Not Applicable    Have you received the COVID-19 vaccine? No        If yes, was it your 1st, 2nd or only dose needed?     Date of most recent vaccine:     Are the vital signs normal?  Yes

## 2021-04-29 NOTE — IP AVS SNAPSHOT
[facility]  [address] [place] [address]  Phone: [phone]  Fax: [phone]                                    After Visit Summary   4/29/2021    Rogelio Noel    MRN: [id number]           After Visit Summary Signature Page    I have received my discharge instructions, and my questions have been answered. I have discussed any challenges I see with this plan with the nurse or doctor.    ..........................................................................................................................................  Patient/Patient Representative Signature      ..........................................................................................................................................  Patient Representative Print Name and Relationship to Patient    ..................................................               ................................................  Date                                   Time    ..........................................................................................................................................  Reviewed by Signature/Title    ...................................................              ..............................................  Date                                               Time          22EPIC Rev 08/18

## 2021-06-06 ENCOUNTER — HEALTH MAINTENANCE LETTER (OUTPATIENT)
Age: 31
End: 2021-06-06

## 2021-07-15 ENCOUNTER — MYC MEDICAL ADVICE (OUTPATIENT)
Dept: PALLIATIVE MEDICINE | Facility: CLINIC | Age: 31
End: 2021-07-15

## 2021-07-15 DIAGNOSIS — M54.16 LUMBAR RADICULOPATHY: Primary | ICD-10-CM

## 2021-07-15 NOTE — TELEPHONE ENCOUNTER
From: Garrett Michel RN      Created: 7/15/2021 1:07 PM        Rogelio     You had a  Left L5 transforaminal epidural steroid injection on 4/29/21, Is this what you would like repeated and if yes.       What percentage of pain relief did you get and for how long did you get it with that injection?     Thanks     Garrett Michel RN  Patient Care Supervisor   [facility]         Garrett Michel RN  Patient Care Supervisor   [facility] [place]

## 2021-07-16 NOTE — TELEPHONE ENCOUNTER
From: Rogelio Noel      Created: 7/15/2021 1:42 PM        *-*-*This message has not been handled.*-*-*    I had no pain until last week. The pain isn't as frequent as it was prior to the last shot, but it steadily got worse over time previously.        Pt is requesting a repeat Left L5 transforaminal epidural steroid injection.      Pt got 100% pain relief for 2.5 months     Will forward to Dr. Clark to review and sign if appropriate.    Garrett Michel RN  Patient Care Supervisor   [facility]

## 2021-07-19 ENCOUNTER — TELEPHONE (OUTPATIENT)
Dept: PALLIATIVE MEDICINE | Facility: CLINIC | Age: 31
End: 2021-07-19

## 2021-07-19 DIAGNOSIS — Z20.822 ENCOUNTER FOR LABORATORY TESTING FOR COVID-19 VIRUS: Primary | ICD-10-CM

## 2021-07-19 NOTE — TELEPHONE ENCOUNTER
Screening Questions for Radiology Injections:    Injection to be done at which interventional clinic site? [facility]    If [facility] location, tell patient that this procedure requires a COVID-19 lab test be done within 4 days of the procedure. Would you still like to move forward with scheduling the procedure?  YES:    If YES, let patient know that someone will call them to schedule the COVID-19 test and that they will only receive a call back if the result is positive. Route to nursing to enter order.     Instruct patient to arrive as directed prior to the scheduled appointment time:    [place] minutes before      [place]: 30 minutes before; if IV needed 1 hour before     Procedure ordered by Eduardo    Procedure ordered? Left L5 transforaminal epidural steroid injection      Transforaminal Cervical CÉSAR - no pain provider currently performing    As a reminder, receiving steroids can decrease your body's ability to fight infection.   Would you still like to move forward with scheduling the injection?  Yes    What insurance would patient like us to bill for this procedure? [employer]      Worker's comp or MVA (motor vehicle accident) -Any injection DO NOT SCHEDULE and route to Radha Richardson.      [employer] insurance - For SI joint injections, DO NOT SCHEDULE and route Radha Richardson.       ALL BCBS, [employer] and [employer]-Route to Radha for review DO NOT SCHEDULE      IF SCHEDULING IN [place] AND NEEDS A PA, IT IS OKAY TO SCHEDULE. [place] HANDLES THEIR OWN PA'S AFTER THE PATIENT IS SCHEDULED. PLEASE SCHEDULE AT LEAST 1 WEEK OUT SO A PA CAN BE OBTAINED.    Any chance of pregnancy? Not Applicable   If YES, do NOT schedule and route to RN pool    Is an  needed? No     Patient has a drive home? (mandatory) YES: INFORMED    Is patient taking any blood thinners (i.e. plavix, coumadin, jantoven, warfarin, heparin, pradaxa or dabigatran, etc)? No   If hold needed, do NOT  schedule, route to RN pool     Is patient taking any aspirin products (includes Excedrin and Fiorinal)? No     If more than 325mg/day, OK to schedule; Instruct pt to decrease to less than 325 mg for 7 days AND route to RN pool    For CERVICAL procedures, hold all aspirin products for 6 days.     Tell pt that if aspirin product is not held for 6 days, the procedure WILL BE cancelled.      Does the patient have a bleeding or clotting disorder? No     If YES, okay to schedule AND route to RN nurse pool    For any patients with platelet count <100, must be forwarded to provider    Any allergies to contrast dye, iodine, shellfish, or numbing and steroid medications? No    If YES, add allergy information to appointment notes AND route to the RN pool     If CÉSAR and Contrast Dye / Iodine Allergy? DO NOT SCHEDULE, route to RN pool    Allergies: Patient has no known allergies.     Is patient diabetic?  No  If YES, instruct them to bring their glucometer.    Does patient have an active infection or treated for one within the past week? No     Is patient currently taking any antibiotics?  No     For patients on chronic, preventative, or prophylactic antibiotics, procedures may be scheduled.     For patients on antibiotics for active or recent infection:antibiotic course must have been completed for 4 days    Is patient currently taking any steroid medications? (i.e. Prednisone, Medrol)  No     For patients on steroid medications, course must have been completed for 4 days    Is patient actively being treated for cancer or immunocompromised? No  If YES, do NOT schedule and route to RN pool     Are you able to get on and off an exam table with minimal or no assistance? Yes  If NO, do NOT schedule and route to RN pool    Are you able to roll over and lay on your stomach with minimal or no assistance? Yes  If NO, do NOT schedule and route to RN pool     Has the patient had a flu shot or any other vaccinations within 7 days before  or after the procedure.  No     Have you recently had a COVID vaccine or have plans to get it in the near future? No    If yes, explain that for the vaccine to work best they need to:       wait 1 week before and 1 week after getting Vaccine #1    wait 1 week before and 2 weeks after getting Vaccine #2    If patient has concerns about the timing, send to RN pool     Does patient have an MRI/CT?  YES: 2021  Check Procedure Scheduling Grid to see if required.      Was the MRI done within the last 3 years?  Yes    If yes, where was the MRI done i.e.[facility], [facility], [place], [facility] etc? [facility]      If no, do not schedule and route to RN pool    If MRI was not done at [place], [facility] or [facility] do NOT schedule and route to RN pool.      If pt has an imaging disc, the injection MAY be scheduled but pt has to bring disc to appt.     If they show up without the disc the injection cannot be done    Procedure Specific Instructions:      If celiac plexus block, informed patient NPO for 6 hours and that it is okay to take medications with sips of water, especially blood pressure medications  Not Applicable         If this is for a cervical procedure, informed patient that aspirin needs to be held for 6 days.   Not Applicable      If IV needed:    Do not schedule procedures requiring IV placement in the first appointment of the day or first appointment after lunch. Do NOT schedule at 0745, 0815 or 1245.     Instructed pt to arrive 30 minutes early for IV start if required. (Check Procedure Scheduling Grid)  Not Applicable    Reminders:      If you are started on any steroids or antibiotics between now and your appointment, you must contact us because the procedure may need to be cancelled.  Yes      For all procedures except radiofrequency ablations (RFAs) and spinal cord stimulator (SCS) trials, informed patient:    IV sedation is not provided for this procedure.  If you feel that an oral anti-anxiety  medication is needed, you can discuss this further with your referring provider or primary care provider.  The Pain Clinic provider will discuss specifics of what the procedure includes at your appointment.  Most procedures last 10-20 minutes.  We use numbing medications to help with any discomfort during the procedure.  Not Applicable      For patients 85 or older we recommend having an adult stay w/ them for the remainder of the day.       Does the patient have any questions?  NO  Deepthi Ocasio  [facility]

## 2021-07-19 NOTE — TELEPHONE ENCOUNTER
COVID test ordered.    Eveline Aguilar, RN-BSN  [facility]

## 2021-07-23 ENCOUNTER — LAB (OUTPATIENT)
Dept: LAB | Facility: CLINIC | Age: 31
End: 2021-07-23
Attending: STUDENT IN AN ORGANIZED HEALTH CARE EDUCATION/TRAINING PROGRAM
Payer: COMMERCIAL

## 2021-07-23 DIAGNOSIS — Z20.822 ENCOUNTER FOR LABORATORY TESTING FOR COVID-19 VIRUS: ICD-10-CM

## 2021-07-23 PROCEDURE — U0003 INFECTIOUS AGENT DETECTION BY NUCLEIC ACID (DNA OR RNA); SEVERE ACUTE RESPIRATORY SYNDROME CORONAVIRUS 2 (SARS-COV-2) (CORONAVIRUS DISEASE [COVID-19]), AMPLIFIED PROBE TECHNIQUE, MAKING USE OF HIGH THROUGHPUT TECHNOLOGIES AS DESCRIBED BY CMS-2020-01-R: HCPCS

## 2021-07-23 PROCEDURE — U0005 INFEC AGEN DETEC AMPLI PROBE: HCPCS

## 2021-07-24 LAB — SARS-COV-2 RNA RESP QL NAA+PROBE: NEGATIVE

## 2021-07-27 ENCOUNTER — HOSPITAL ENCOUNTER (OUTPATIENT)
Dept: PALLIATIVE MEDICINE | Facility: CLINIC | Age: 31
Discharge: HOME | End: 2021-07-27
Attending: STUDENT IN AN ORGANIZED HEALTH CARE EDUCATION/TRAINING PROGRAM | Admitting: STUDENT IN AN ORGANIZED HEALTH CARE EDUCATION/TRAINING PROGRAM
Payer: COMMERCIAL

## 2021-07-27 VITALS
HEART RATE: 67 BPM | RESPIRATION RATE: 17 BRPM | SYSTOLIC BLOOD PRESSURE: 141 MMHG | DIASTOLIC BLOOD PRESSURE: 83 MMHG | TEMPERATURE: 96.5 F

## 2021-07-27 DIAGNOSIS — M54.16 LUMBAR RADICULOPATHY: ICD-10-CM

## 2021-07-27 PROCEDURE — 250N000011 HC RX IP 250 OP 636: Performed by: STUDENT IN AN ORGANIZED HEALTH CARE EDUCATION/TRAINING PROGRAM

## 2021-07-27 PROCEDURE — 255N000002 HC RX 255 OP 636: Performed by: STUDENT IN AN ORGANIZED HEALTH CARE EDUCATION/TRAINING PROGRAM

## 2021-07-27 PROCEDURE — 64483 NJX AA&/STRD TFRM EPI L/S 1: CPT | Mod: LT | Performed by: STUDENT IN AN ORGANIZED HEALTH CARE EDUCATION/TRAINING PROGRAM

## 2021-07-27 RX ORDER — IOPAMIDOL 408 MG/ML
10 INJECTION, SOLUTION INTRATHECAL ONCE
Status: COMPLETED | OUTPATIENT
Start: 2021-07-27 | End: 2021-07-27

## 2021-07-27 RX ORDER — BUPIVACAINE HYDROCHLORIDE 2.5 MG/ML
10 INJECTION, SOLUTION INFILTRATION; PERINEURAL ONCE
Status: COMPLETED | OUTPATIENT
Start: 2021-07-27 | End: 2021-07-27

## 2021-07-27 RX ORDER — DEXAMETHASONE SODIUM PHOSPHATE 10 MG/ML
10 INJECTION, SOLUTION INTRAMUSCULAR; INTRAVENOUS ONCE
Status: COMPLETED | OUTPATIENT
Start: 2021-07-27 | End: 2021-07-27

## 2021-07-27 RX ADMIN — BUPIVACAINE HYDROCHLORIDE 1 ML: 2.5 INJECTION, SOLUTION EPIDURAL; INFILTRATION; INTRACAUDAL; PERINEURAL at 07:51

## 2021-07-27 RX ADMIN — IOPAMIDOL 1 ML: 408 INJECTION, SOLUTION INTRATHECAL at 07:50

## 2021-07-27 RX ADMIN — DEXAMETHASONE SODIUM PHOSPHATE 10 MG: 10 INJECTION, SOLUTION INTRAMUSCULAR; INTRAVENOUS at 07:51

## 2021-07-27 NOTE — PROGRESS NOTES
Pre-procedure Intake    Have you been fasting? Yes    If yes, for how long?     Are you taking a prescribed blood thinner such as coumadin, Plavix, Xarelto?    No    If yes, when did you take your last dose?     Do you take aspirin?  No    If cervical procedure, have you held aspirin for 6 days?   NA    Do you have any allergies to contrast dye, iodine, steroid and/or numbing medications?  NO    Are you currently taking antibiotics or have an active infection?  NO    Have you had a fever/elevated temperature within the past week? NO    Are you currently taking oral steroids? NO    Do you have a ? Yes       Are you pregnant or breastfeeding?  Not Applicable    Have you received the COVID-19 vaccine? No       If yes, was it your 1st, 2nd or only dose needed?     Date of most recent vaccine:     Notify provider and RNs if systolic BP >170, diastolic BP >100, P >100 or O2 sats < 90%

## 2021-07-27 NOTE — DISCHARGE INSTRUCTIONS
[facility]   Procedure Discharge Instructions    Today you saw:        Dr. Pennie Clark    You had an:  Epidural steroid injection        Medications used:   Bupivacaine   Dexamethasone  IsoVue            Be cautious when walking. Numbness and/or weakness in the lower extremities may occur for up to 6-8 hours after the procedure due to effect of the local anesthetic    Do not drive for 6 hours. The effect of the local anesthetic could slow your reflexes.     You may resume your regular activities after 24 hours    Avoid strenuous activity for the first 24 hours    You may shower, however avoid swimming, tub baths or hot tubs for 24 hours following your procedure    You may have a mild to moderate increase in pain for several days following the injection.    It may take up to 14 days for the steroid medication to start working although you may feel the effect as early as a few days after the procedure.       You may use ice packs for 10-15 minutes, 3 to 4 times a day at the injection site for comfort    Do not use heat to painful areas for 6 to 8 hours. This will give the local anesthetic time to wear off and prevent you from accidentally burning your skin.     Unless you have been directed to avoid the use of anti-inflammatory medications (NSAIDS), you may use medications such as ibuprofen, Aleve or Tylenol for pain control if needed.     If you were fasting, you may resume your normal diet and medications after the procedure    Possible side effects of steroids that you may experience include flushing, elevated blood pressure, increased appetite, mild headaches and restlessness.  All of these symptoms will get better with time.    If you experience any of the following, call the Pain Clinic during work hours (Mon-Friday 8-4:30 pm) at [phone] or the Provider Line after hours at [phone]:  -Fever over 100 degree F  -Swelling, bleeding, redness, drainage, warmth at the  injection site  -Progressive weakness or numbness in your legs.  -Loss of bowel or bladder function  -Unusual headache that is not relieved by Tylenol or other pain reliever  -Unusual new onset of pain that is not improving

## 2021-07-27 NOTE — PROGRESS NOTES
"Pre procedure Diagnosis: lumbar radiculopathy   Post procedure Diagnosis: Same  Procedure performed: Left L5 transforaminal epidural steroid injection   Anesthesia: none  Complications: none  Operators: Pennie Clark MD     Needle: 25g 5\"  Injectate: 1ml 0.25% bupivacaine, 10mg of dexamethasone    Indications:   Rogelio Noel is a 31 year old male was sent by me for left L5 TFESI.  he has a 2 month history of left low back, buttock and posterior thigh pain.  Exam shows positive left femoral stretch test and he has tried conservative treatment including yoga and other activities.    MRI was done on 4/7/2021 which showed   L5-S1: Disc desiccation with mild disc height loss. Diffuse disc bulge slightly eccentric to the left with probable superimposed central disc protrusion component. Mild--to-moderate facet arthropathy. Moderate left lateral recess stenosis, including mild mass effect and slight asymmetric posterior displacement of the traversing left S1 nerve root (series 5 image 43) mild right lateral recess stenosis. No central spinal canal stenosis. Mild left and minimal right neural foraminal Stenosis.    Options/alternatives, benefits and risks were discussed with the patient including bleeding, infection, tissue trauma, numbness, weakness, paralysis, spinal cord injury, radiation exposure, headache and reaction to medications. Questions were answered to his satisfaction and he agrees to proceed. Voluntary informed consent was obtained and signed.     Vitals were reviewed: Yes  Allergies were reviewed:  Yes   Medications were reviewed:  Yes   Pre-procedure pain score: 7/10    Procedure:  After getting informed consent, patient was brought into the procedure suite and was placed in a prone position on the procedure table.   A Pause for the Cause was performed.  Patient was prepped and draped in sterile fashion.     After identifying the left L5 neuroforamen, the C-arm was rotated to a left lateral oblique " angle. A 25 gauge 5 inch spinal needle was placed coaxial with the fluoroscopy beam and overriding the superior aspect of the neuroforamen. The spinal needle was advanced under intermittent fluoroscopy until it entered the foramen superiorly.    The position was then inspected from anteroposterior and lateral views, and the needle adjusted appropriately.  A total of 1ml of Isovue 200 was injected, confirming appropriate position, with spread into the epidural space, with no intravascular uptake. 9ml was wasted    1ml of 0.25% bupivacaine and 10mg of dexamethasone were injected from separate syringes.  The needle was flushed with lidocaine and removed.    During the procedure, there was not a paresthesia.   Hemostasis was achieved, the area was cleaned, and bandaids were placed when appropriate.  The patient tolerated the procedure well, and was taken to the recovery room.    Images were saved to PACS.    Post-procedure pain score: 3/10  Follow-up includes:   -f/u with referring provider  -if Pt does not obtain sufficient or long-lasting relief from this injection, would consider a left S1 Transforaminal epidural steroid injection    Pennie Clark MD  [facility]

## 2021-09-26 ENCOUNTER — HEALTH MAINTENANCE LETTER (OUTPATIENT)
Age: 31
End: 2021-09-26

## 2021-09-27 ENCOUNTER — OFFICE VISIT (OUTPATIENT)
Dept: PODIATRY | Facility: CLINIC | Age: 31
End: 2021-09-27
Payer: COMMERCIAL

## 2021-09-27 VITALS
SYSTOLIC BLOOD PRESSURE: 143 MMHG | WEIGHT: 277 LBS | BODY MASS INDEX: 34.44 KG/M2 | DIASTOLIC BLOOD PRESSURE: 78 MMHG | HEART RATE: 84 BPM | HEIGHT: 75 IN

## 2021-09-27 DIAGNOSIS — M77.51 BONE SPUR OF RIGHT ANKLE: ICD-10-CM

## 2021-09-27 DIAGNOSIS — M77.31 POSTERIOR CALCANEAL SPUR OF RIGHT FOOT: ICD-10-CM

## 2021-09-27 DIAGNOSIS — M25.571 PAIN IN JOINT INVOLVING ANKLE AND FOOT, RIGHT: Primary | ICD-10-CM

## 2021-09-27 DIAGNOSIS — M54.16 LUMBAR RADICULOPATHY: ICD-10-CM

## 2021-09-27 PROCEDURE — 99213 OFFICE O/P EST LOW 20 MIN: CPT | Performed by: PODIATRIST

## 2021-09-27 ASSESSMENT — MIFFLIN-ST. JEOR: SCORE: 2302.09

## 2021-09-27 NOTE — PROGRESS NOTES
"Rogelio returns to the office for reevaluation of the bilateral foot.  The patient relates following the instructions given at the last visit with noted continued pain to both feet right greater than left.  The patient relates no other problems.    Vitals: BP (!) 143/78   Pulse 84   Ht 1.905 m (6' 3\")   Wt 125.6 kg (277 lb)   BMI 34.62 kg/m    BMI= Body mass index is 34.62 kg/m .    Lower Extremity Physical Exam:      Neurovascular status remains unchanged.  Muscular exam is within normal limits to major muscle groups.  Integument is intact.      Noted pain on palpation over the posterior lateral aspect of the rear foot on the right.  Noted pain with maximum dorsiflexion of the right ankle.       Assessment:     ICD-10-CM    1. Pain in joint involving ankle and foot, right  M25.571 [facility]   2. Bone spur of right ankle  M77.51 [facility]   3. Posterior calcaneal spur of right foot  M77.31    4. Lumbar radiculopathy  M54.16 [facility]       Plan:    I have explained to Rogelio about the progress of the conditions.  At this time, the patient was educated on the importance of offloading supportive shoes and other devices.  I demonstrated to the patient calf stretches to perform every hour daily until symptoms resolve.  After symptoms resolve, the patient was advised to perform the stretches 3 times daily to prevent future recurrence.  The patient was instructed to perform warm soaks with Epson salt after which to also apply over-the-counter Voltaren gel to deeply massage the injured tissue.  The patient was instructed to do this on a daily basis until symptoms resolve.  The patient may also take over-the-counter NSAID medication, if tolerated, to help further reduce the inflammation tissue.   The patient was advised to take this type of medication with food to prevent stomach irritation and to stop taking the medication if stomach irritation occurs.  The " patient was referred to [facility] for custom orthotics that will aid in offloading the tension forces to the soft tissues and prevent further inflammation.   The patient will return in four weeks for reevaluation if the symptoms do not resolve.      Rogelio verbalized agreement with and understanding of the rational for the diagnosis and treatment plan.  All questions were answered to best of my ability and the patient's satisfaction. The patient was advised to contact the clinic with any questions that may arise after the clinic visit.      Disclaimer: This note consists of symbols derived from keyboarding, dictation and/or voice recognition software. As a result, there may be errors in the script that have gone undetected. Please consider this when interpreting information found in this chart.       MALINA Melendez D.P.M., F.[place].C.F.A.S.

## 2021-09-27 NOTE — LETTER
"    9/27/2021         RE: Rogelio Noel  [id number] [place] [address]        Dear Colleague,    Thank you for referring your patient, Rogelio Noel, to the [facility] [place]. Please see a copy of my visit note below.    Rogelio returns to the office for reevaluation of the bilateral foot.  The patient relates following the instructions given at the last visit with noted continued pain to both feet right greater than left.  The patient relates no other problems.    Vitals: BP (!) 143/78   Pulse 84   Ht 1.905 m (6' 3\")   Wt 125.6 kg (277 lb)   BMI 34.62 kg/m    BMI= Body mass index is 34.62 kg/m .    Lower Extremity Physical Exam:      Neurovascular status remains unchanged.  Muscular exam is within normal limits to major muscle groups.  Integument is intact.      Noted pain on palpation over the posterior lateral aspect of the rear foot on the right.  Noted pain with maximum dorsiflexion of the right ankle.       Assessment:     ICD-10-CM    1. Pain in joint involving ankle and foot, right  M25.571 [facility]   2. Bone spur of right ankle  M77.51 [facility]   3. Posterior calcaneal spur of right foot  M77.31    4. Lumbar radiculopathy  M54.16 [facility]       Plan:    I have explained to Rogelio about the progress of the conditions.  At this time, the patient was educated on the importance of offloading supportive shoes and other devices.  I demonstrated to the patient calf stretches to perform every hour daily until symptoms resolve.  After symptoms resolve, the patient was advised to perform the stretches 3 times daily to prevent future recurrence.  The patient was instructed to perform warm soaks with Epson salt after which to also apply over-the-counter Voltaren gel to deeply massage the injured tissue.  The patient was instructed to do this on a daily basis until symptoms resolve.  The patient may also take " over-the-counter NSAID medication, if tolerated, to help further reduce the inflammation tissue.   The patient was advised to take this type of medication with food to prevent stomach irritation and to stop taking the medication if stomach irritation occurs.  The patient was referred to [facility] for custom orthotics that will aid in offloading the tension forces to the soft tissues and prevent further inflammation.   The patient will return in four weeks for reevaluation if the symptoms do not resolve.      Rogelio verbalized agreement with and understanding of the rational for the diagnosis and treatment plan.  All questions were answered to best of my ability and the patient's satisfaction. The patient was advised to contact the clinic with any questions that may arise after the clinic visit.      Disclaimer: This note consists of symbols derived from keyboarding, dictation and/or voice recognition software. As a result, there may be errors in the script that have gone undetected. Please consider this when interpreting information found in this chart.       RAFAL Kong.P.M., F.A.C.F.A.S.        Again, thank you for allowing me to participate in the care of your patient.        Sincerely,        Eduardo Melendez DPM

## 2021-09-27 NOTE — NURSING NOTE
"Chief Complaint   Patient presents with     Consult     discuss inserts       Initial BP (!) 143/78   Pulse 84   Ht 1.905 m (6' 3\")   Wt 125.6 kg (277 lb)   BMI 34.62 kg/m   Estimated body mass index is 34.62 kg/m  as calculated from the following:    Height as of this encounter: 1.905 m (6' 3\").    Weight as of this encounter: 125.6 kg (277 lb).  Medications and allergies reviewed.      Verónica LYLES MA    "

## 2021-09-27 NOTE — PATIENT INSTRUCTIONS
Next Steps:      1. Support:  a. Wear supportive shoes, sandals, boots and/or inserts that have a rigid supportive sole.    i. This will offload the majority of tension forces that travel through your feet every step you take.    1. SkCinemur Max Cushioning Elite/Premier   2. Skechers Relax Fit D'Lux Walker  3. Superfeet inserts (www.superfeet.com)  b. It is important that you also wear supportive shoe wear in the house to continue providing support to your feet.    c. You may always use a cushioned liner for your shoes if that makes your feet feel better.  2. Stretching  a. Calf stretching is essential to offload the tension forces that travel through your feet every step you take  b. Preferred calf stretch is the Runner's Stretch  i. Place one foot behind the other foot, flat against the ground (it is important to keep the heel on the ground).  The back leg is the one that will be stretched.  1. Start with the knee straight and lean your hips into the wall, counter or whatever you are leaning into - count to ten.  2. Next, bend the knee.  You should feel the stretch lower in the calf muscle - count to ten.  c. Repeat this stretch once an hour to start off with.  When symptoms subside, I recommend performing the stretch 3 times daily to prevent any future problems.                3. Tissue Massage  a. It is important that you physically loosen the inflammation tissue to help your body heal the injured tissue.  b. I recommend soaking your foot in warm water to increase the microcirculation to the soft tissues.  You may add Epson salt to the water if you prefer.  c. You may apply an over-the-counter muscle rub, such as Voltaren gel, and deeply massage the injured tissue.  4. Reduce Inflammation  a. You can ice the injured tissue with an ice pack with a light cloth covering or soaking in ice water 20 minutes to reduce any acute inflammation, typically at the end of the day.  b. If tolerated, you may take a  Non-Steroidal Antiinflammatory medication (NSAID), such as Advil or Aleve, to help reduce the inflammation tissue.  This can help the overall healing of the injured tissue.  i. It is important to take food with any NSAID medication as the most common side effect is stomach irritation.  If you encounter any problems when taking NSAID, it is recommended that you stop taking the medication and notify your provider.    It is important to understand that most problems that develop in the foot and ankle are caused by excessive tension that cause microinjury to the soft tissues and inflammation in the foot and ankle.  By addressing the underlying causes with support and stretching as well as treating the current inflammatory conditions with tissue massage and anti-inflammatory treatments, most foot and ankle musculoskeletal conditions will resolve.  This may take time to heal.  However, if symptoms persist past 4 weeks you should return to the office for reevaluation to determine further treatment options.    Patient Education     Understanding Lumbar Radiculopathy    Lumbar radiculopathy is irritation or inflammation of a nerve root in the low back. It causes symptoms that spread out from the back down one or both legs. To understand this condition, it helps to understand the parts of the spine:    Vertebrae. These are bones that stack to form the spine. The lumbar spine contains 5 vertebrae near the bottom of your spine.    Disks. These are soft pads of tissue between the vertebrae. They act as shock absorbers for the spine.    Spinal canal. This is a tunnel formed within the stacked vertebrae. In the lumbar spine, nerves run through this canal.    Nerves. These branch off and leave the spinal canal, traveling out to parts of the body. As they leave the spinal canal, nerves pass through openings between the vertebrae. The nerve root is the part of the nerve that is closest to the spinal canal.    Sciatic nerve. This is a  large nerve formed from several nerve roots in the low back. This nerve extends down the back of the leg to the foot.  With lumbar radiculopathy, nerve roots in the low back become irritated. This leads to pain and symptoms. The sciatic nerve is commonly involved, so the condition is often called sciatica.  What causes lumbar radiculopathy?  Aging, injury, poor posture, extra body weight, and other issues can lead to problems in the low back. These problems may then irritate nerve roots. They include:    Damage to a disk in the lumbar spine. The damaged disk may then press on nearby nerve roots.    Degeneration from wear and tear, and aging. This can lead to narrowing (stenosis) of the openings between the vertebrae. The narrowed openings press on nerve roots as they leave the spinal canal.    Unstable spine. This is when a vertebra slips forward. It can then press on a nerve root.  Other, less common things can put pressure on nerves in the low back. These include diabetes, infection, or a tumor.  Symptoms of lumbar radiculopathy  These include:    Pain in the low back    Pain, numbness, tingling, or weakness that travels into the buttocks, hip, groin, or leg    Muscle spasms in the low back, or leg  Treatment for lumbar radiculopathy  In most cases, your healthcare provider will first try treatments that help relieve symptoms. These may include:    Prescription and over-the-counter pain medicines. These help relieve pain, swelling, and irritation.    Limits on positions and activities that increase pain. But lying in bed or avoiding all movement is only recommended for a short period of time.    Physical therapy, including exercises and stretches. This helps decrease pain and increase movement and function.    Steroid shots into the lower back. This may help relieve symptoms for a time.    Weight-loss program. If you are overweight, losing extra pounds (kilograms) may help relieve symptoms.  In some cases, you may  need surgery to fix the underlying problem. This depends on the cause, the symptoms, and how long the pain has lasted.  Possible complications  Over time, an irritated and inflamed nerve may become damaged. This may lead to long-lasting (permanent) numbness or weakness in your legs and feet. If symptoms change suddenly or get worse, be sure to let your healthcare provider know.  When to call your healthcare provider  Call your healthcare provider right away if you have any of these:    New pain or pain that gets worse    New or increasing weakness, tingling, or numbness in your leg or foot    Problems controlling your bladder or bowel  [facility] last reviewed this educational content on 2/1/2020 2000-2021 [employer]. All rights reserved. This information is not intended as a substitute for professional medical care. Always follow your healthcare professional's instructions.

## 2021-10-18 ENCOUNTER — OFFICE VISIT (OUTPATIENT)
Dept: FAMILY MEDICINE | Facility: CLINIC | Age: 31
End: 2021-10-18
Payer: COMMERCIAL

## 2021-10-18 VITALS
DIASTOLIC BLOOD PRESSURE: 68 MMHG | SYSTOLIC BLOOD PRESSURE: 136 MMHG | BODY MASS INDEX: 35.93 KG/M2 | OXYGEN SATURATION: 97 % | TEMPERATURE: 97.1 F | HEIGHT: 75 IN | WEIGHT: 289 LBS | RESPIRATION RATE: 15 BRPM | HEART RATE: 80 BPM

## 2021-10-18 DIAGNOSIS — E66.812 CLASS 2 OBESITY DUE TO EXCESS CALORIES WITHOUT SERIOUS COMORBIDITY WITH BODY MASS INDEX (BMI) OF 36.0 TO 36.9 IN ADULT: Primary | ICD-10-CM

## 2021-10-18 DIAGNOSIS — E66.01 MORBID OBESITY (H): ICD-10-CM

## 2021-10-18 DIAGNOSIS — E66.09 CLASS 2 OBESITY DUE TO EXCESS CALORIES WITHOUT SERIOUS COMORBIDITY WITH BODY MASS INDEX (BMI) OF 36.0 TO 36.9 IN ADULT: Primary | ICD-10-CM

## 2021-10-18 PROCEDURE — 99213 OFFICE O/P EST LOW 20 MIN: CPT | Performed by: FAMILY MEDICINE

## 2021-10-18 ASSESSMENT — PAIN SCALES - GENERAL: PAINLEVEL: NO PAIN (0)

## 2021-10-18 ASSESSMENT — MIFFLIN-ST. JEOR: SCORE: 2348.59

## 2021-10-18 NOTE — NURSING NOTE
"Chief Complaint   Patient presents with     Weight Problem     States would like a referral to nutritionist for weight loss       Initial /68 (BP Location: Right arm, Patient Position: Chair, Cuff Size: Adult Large)   Pulse 80   Temp 97.1  F (36.2  C) (Tympanic)   Resp 15   Ht 1.892 m (6' 2.5\")   Wt 131.1 kg (289 lb)   SpO2 97%   BMI 36.61 kg/m   Estimated body mass index is 36.61 kg/m  as calculated from the following:    Height as of this encounter: 1.892 m (6' 2.5\").    Weight as of this encounter: 131.1 kg (289 lb).    Patient presents to the clinic using No DME    Health Maintenance that is potentially due pending provider review:  Physical, Covid, and flu    "

## 2021-10-18 NOTE — PROGRESS NOTES
"Chief Complaint   Patient presents with     Weight Problem     States would like a referral to nutritionist for weight loss             s ;Rogelio LYLES Sadie is a 31 year old male with obesity, morbid.  36 BMI with htn.      Wants to get some formal nutrition counseling.  Obesity has run in the family, hasn't had great modeling regarding meals, healthy eating, and goals.     O:Blood pressure 136/68, pulse 80, temperature 97.1  F (36.2  C), temperature source Tympanic, resp. rate 15, height 1.892 m (6' 2.5\"), weight 131.1 kg (289 lb), SpO2 97 %.  .s  GEN: Alert and oriented, in no acute distress  CV: RRR, no murmur  RESP: lungs clear bilaterally, good effort  EXT: no edema or lesions     A: morbid obesity      P: encouraged his decision to make changes.  Nutrition consult given.  He is motivated, and should do well if he can channel that motivation into action.     Weight management plan: diet/exercise     "

## 2021-11-13 ENCOUNTER — OFFICE VISIT (OUTPATIENT)
Dept: URGENT CARE | Facility: URGENT CARE | Age: 31
End: 2021-11-13
Payer: COMMERCIAL

## 2021-11-13 VITALS
OXYGEN SATURATION: 97 % | SYSTOLIC BLOOD PRESSURE: 146 MMHG | DIASTOLIC BLOOD PRESSURE: 72 MMHG | BODY MASS INDEX: 36.1 KG/M2 | WEIGHT: 285 LBS | TEMPERATURE: 97.6 F | RESPIRATION RATE: 20 BRPM | HEART RATE: 76 BPM

## 2021-11-13 DIAGNOSIS — H60.503 ACUTE OTITIS EXTERNA OF BOTH EARS, UNSPECIFIED TYPE: Primary | ICD-10-CM

## 2021-11-13 DIAGNOSIS — I10 BENIGN ESSENTIAL HYPERTENSION: ICD-10-CM

## 2021-11-13 PROCEDURE — 99213 OFFICE O/P EST LOW 20 MIN: CPT | Performed by: FAMILY MEDICINE

## 2021-11-13 RX ORDER — POLYMYXIN B SULFATE AND TRIMETHOPRIM 1; 10000 MG/ML; [USP'U]/ML
2 SOLUTION OPHTHALMIC 3 TIMES DAILY
Qty: 10 ML | Refills: 0 | Status: SHIPPED | OUTPATIENT
Start: 2021-11-13 | End: 2021-11-18

## 2021-11-13 RX ORDER — LISINOPRIL 20 MG/1
20 TABLET ORAL DAILY
Qty: 30 TABLET | Refills: 0 | Status: SHIPPED | OUTPATIENT
Start: 2021-11-13 | End: 2021-11-24

## 2021-11-13 RX ORDER — AZITHROMYCIN 250 MG/1
TABLET, FILM COATED ORAL
Qty: 6 TABLET | Refills: 0 | Status: SHIPPED | OUTPATIENT
Start: 2021-11-13 | End: 2022-03-10

## 2021-11-13 NOTE — PROGRESS NOTES
SUBJECTIVE:  Rogelio Noel is a 31 year old male who presents with both ear pain, fullness and hearing loss for 1 day(s).   Severity: severe   Timing:gradual onset, still present and worsening  Additional symptoms include congestion and cough.    Also out of BP med and seeing pcp next week.  History of recurrent otitis: no    No past medical history on file.  Current Outpatient Medications   Medication Sig Dispense Refill     azithromycin (ZITHROMAX Z-ROLAND) 250 MG tablet 2 tabs day one, then 1 tab daily 6 tablet 0     lisinopril (ZESTRIL) 20 MG tablet Take 1 tablet (20 mg) by mouth daily 30 tablet 0     lisinopril (ZESTRIL) 20 MG tablet Take 1 tablet (20 mg) by mouth daily 90 tablet 3     trimethoprim-polymyxin b (POLYTRIM) 00373-7.1 UNIT/ML-% ophthalmic solution Place 2 drops into both ears 3 times daily for 5 days 10 mL 0     order for DME Equipment being ordered: quarter inch heel lift 1 Units 0     History   Smoking Status     Current Some Day Smoker     Types: Cigarettes   Smokeless Tobacco     Never Used       ROS:   Review of systems negative except as stated above.    OBJECTIVE:  BP (!) 146/72 (BP Location: Right arm, Patient Position: Sitting, Cuff Size: Adult Regular)   Pulse 76   Temp 97.6  F (36.4  C) (Tympanic)   Resp 20   Wt 129.3 kg (285 lb)   SpO2 97%   BMI 36.10 kg/m    The right TM is distorted light reflex, erythematous and immobile with insufflation     The right auditory canal is erythematous with, edema   The left TM is distorted light reflex, erythematous and immobile with insufflation  The left auditory canal is normal and without drainage, edema or erythema  Oropharynx exam is normal: no lesions, erythema, adenopathy or exudate.  GENERAL: no acute distress  EYES: EOMI,  PERRL, conjunctiva clear  NECK: supple, non-tender to palpation, no adenopathy noted  RESP: lungs clear to auscultation - no rales, rhonchi or wheezes  CV: regular rates and rhythm, normal S1 S2, no murmur  noted  SKIN: no suspicious lesions or rashes     ASSESSMENT: otitis externa rt  Acute otitis media, bilateral  hypertension  PLAN:  Short rx for his bp med, Lisinopril.  Zithromax and polytrim for ears  Follow up with primary care provider if no improvement.

## 2021-11-15 ENCOUNTER — TELEPHONE (OUTPATIENT)
Dept: FAMILY MEDICINE | Facility: CLINIC | Age: 31
End: 2021-11-15
Payer: COMMERCIAL

## 2021-11-15 NOTE — TELEPHONE ENCOUNTER
Patient Quality Outreach      Summary:    Patient has the following on his problem list/HM:     Hypertension   Last three blood pressure readings:  BP Readings from Last 3 Encounters:   11/13/21 (!) 146/72   10/18/21 136/68   09/27/21 (!) 143/78     Blood pressure: Failed    HTN Guidelines:  ? 139/89     Patient is due/failing the following:   Hypertension -  Hypertension follow-up visit    Type of outreach:    Phone, left message for patient/parent to call back.    Questions for provider review:    None                                                                                                                                     Giana Lopez MA       Chart routed to .

## 2021-11-24 ENCOUNTER — OFFICE VISIT (OUTPATIENT)
Dept: FAMILY MEDICINE | Facility: CLINIC | Age: 31
End: 2021-11-24
Payer: COMMERCIAL

## 2021-11-24 VITALS
OXYGEN SATURATION: 100 % | DIASTOLIC BLOOD PRESSURE: 84 MMHG | RESPIRATION RATE: 16 BRPM | HEIGHT: 75 IN | SYSTOLIC BLOOD PRESSURE: 136 MMHG | WEIGHT: 293 LBS | BODY MASS INDEX: 36.43 KG/M2

## 2021-11-24 DIAGNOSIS — E66.01 MORBID OBESITY (H): ICD-10-CM

## 2021-11-24 DIAGNOSIS — Z00.00 WELL ADULT EXAM: Primary | ICD-10-CM

## 2021-11-24 DIAGNOSIS — I10 BENIGN ESSENTIAL HYPERTENSION: ICD-10-CM

## 2021-11-24 LAB
ANION GAP SERPL CALCULATED.3IONS-SCNC: 3 MMOL/L (ref 3–14)
BUN SERPL-MCNC: 28 MG/DL (ref 7–30)
CALCIUM SERPL-MCNC: 9.2 MG/DL (ref 8.5–10.1)
CHLORIDE BLD-SCNC: 106 MMOL/L (ref 94–109)
CO2 SERPL-SCNC: 31 MMOL/L (ref 20–32)
CREAT SERPL-MCNC: 1.03 MG/DL (ref 0.66–1.25)
GFR SERPL CREATININE-BSD FRML MDRD: >90 ML/MIN/1.73M2
GLUCOSE BLD-MCNC: 118 MG/DL (ref 70–99)
POTASSIUM BLD-SCNC: 4.6 MMOL/L (ref 3.4–5.3)
SODIUM SERPL-SCNC: 140 MMOL/L (ref 133–144)

## 2021-11-24 PROCEDURE — 80048 BASIC METABOLIC PNL TOTAL CA: CPT | Performed by: FAMILY MEDICINE

## 2021-11-24 PROCEDURE — 36415 COLL VENOUS BLD VENIPUNCTURE: CPT | Performed by: FAMILY MEDICINE

## 2021-11-24 PROCEDURE — 99395 PREV VISIT EST AGE 18-39: CPT | Performed by: FAMILY MEDICINE

## 2021-11-24 PROCEDURE — 99213 OFFICE O/P EST LOW 20 MIN: CPT | Mod: 25 | Performed by: FAMILY MEDICINE

## 2021-11-24 RX ORDER — LISINOPRIL 20 MG/1
20 TABLET ORAL DAILY
Qty: 90 TABLET | Refills: 3 | Status: SHIPPED | OUTPATIENT
Start: 2021-11-24 | End: 2022-12-07

## 2021-11-24 ASSESSMENT — ENCOUNTER SYMPTOMS: HYPERTENSION: 1

## 2021-11-24 ASSESSMENT — MIFFLIN-ST. JEOR: SCORE: 2366.73

## 2021-11-24 NOTE — PROGRESS NOTES
SUBJECTIVE:   CC: Roeglio Noel is an 31 year old male who presents for preventative health visit.       Patient has been advised of split billing requirements and indicates understanding: Yes  Hypertension     Healthy Habits:     Getting at least 3 servings of Calcium per day:  Yes    Bi-annual eye exam:  Yes    Dental care twice a year:  Yes    Sleep apnea or symptoms of sleep apnea:  None    Diet:  Regular (no restrictions)    Frequency of exercise:  4-5 days/week    Duration of exercise:  Greater than 60 minutes    Barriers to taking medications:  Not applicable    Medication side effects:  Not applicable    PHQ-2 Total Score: 0    Additional concerns today:  Yes          Hypertension- med refills.  Borderline, knows to watch values.  20mg lisinopril.  Labs and fills for the year  Morbid obesity: 37 BMI with htn.  Working on diet changes.  Was going to see dietician, but the insurance didn't cover, so he didn't go.      Today's PHQ-2 Score:   PHQ-2 ( 1999 Pfizer) 11/24/2021   Q1: Little interest or pleasure in doing things 0   Q2: Feeling down, depressed or hopeless 0   PHQ-2 Score 0   PHQ-2 Total Score (12-17 Years)- Positive if 3 or more points; Administer PHQ-A if positive -   Q1: Little interest or pleasure in doing things Not at all   Q2: Feeling down, depressed or hopeless Not at all   PHQ-2 Score 0       Abuse: Current or Past(Physical, Sexual or Emotional)- No  Do you feel safe in your environment? Yes    Have you ever done Advance Care Planning? (For example, a Health Directive, POLST, or a discussion with a medical provider or your loved ones about your wishes)    Social History     Tobacco Use     Smoking status: Current Some Day Smoker     Types: Cigarettes     Smokeless tobacco: Never Used   Substance Use Topics     Alcohol use: Yes     Comment: Beer once or twice a month      If you drink alcohol do you typically have >3 drinks per day or >7 drinks per week? Yes      No flowsheet data found.No  "flowsheet data found.    Last PSA: No results found for: PSA    Reviewed orders with patient. Reviewed health maintenance and updated orders accordingly - Yes      Reviewed and updated as needed this visit by clinical staff  Tobacco  Allergies  Meds   Med Hx  Surg Hx  Fam Hx  Soc Hx       Reviewed and updated as needed this visit by Provider                Review of Systems  CONSTITUTIONAL: NEGATIVE for fever, chills, change in weight  INTEGUMENTARY/SKIN: NEGATIVE for worrisome rashes, moles or lesions  EYES: NEGATIVE for vision changes or irritation  ENT: NEGATIVE for ear, mouth and throat problems  RESP: NEGATIVE for significant cough or SOB  CV: NEGATIVE for chest pain, palpitations or peripheral edema  GI: NEGATIVE for nausea, abdominal pain, heartburn, or change in bowel habits   male: negative for dysuria, hematuria, decreased urinary stream, erectile dysfunction, urethral discharge  MUSCULOSKELETAL: NEGATIVE for significant arthralgias or myalgia  NEURO: NEGATIVE for weakness, dizziness or paresthesias  PSYCHIATRIC: NEGATIVE for changes in mood or affect    OBJECTIVE:   Resp 16   Ht 1.892 m (6' 2.5\")   Wt 132.9 kg (293 lb)   SpO2 100%   BMI 37.12 kg/m      Physical Exam  Gen: alert and oriented, in no acute distress, affect within normal limits  Neck: supple with no masses or nodes  Throat: oropharynx clear, no exudate or tonsillar/palate asymmetry.    CV: RRR, no murmur  Lungs: clear bilaterally with good effort  Abd: nontender, no mass  Ext: no edema or lesions   Neuro: moving all extremities, gait normal, no focal deficts noted    ASSESSMENT/PLAN:   Well exam  Htn, stable  Morbid obesity, ongoing    Fills.  Labs.  Counseled on diet, healthy choices, etc.  He is motivated          COUNSELING:   Reviewed preventive health counseling, as reflected in patient instructions       Regular exercise       Healthy diet/nutrition    Estimated body mass index is 37.12 kg/m  as calculated from the " "following:    Height as of this encounter: 1.892 m (6' 2.5\").    Weight as of this encounter: 132.9 kg (293 lb).     Weight management plan: diet/exercise    He reports that he has been smoking cigarettes. He has never used smokeless tobacco.  Tobacco Cessation Action Plan:   rrare use, cigar.            Mitesh Bai MD  [facility]  "

## 2021-11-24 NOTE — NURSING NOTE
"Chief Complaint   Patient presents with     Hypertension     Physical       Initial /84   Resp 16   Ht 1.892 m (6' 2.5\")   Wt 132.9 kg (293 lb)   SpO2 100%   BMI 37.12 kg/m   Estimated body mass index is 37.12 kg/m  as calculated from the following:    Height as of this encounter: 1.892 m (6' 2.5\").    Weight as of this encounter: 132.9 kg (293 lb).    Patient presents to the clinic using No DME    Health Maintenance that is potentially due pending provider review:  Flu shot- declined         Is there anyone who you would like to be able to receive your results? No  If yes have patient fill out BILLY    "

## 2022-03-10 ENCOUNTER — OFFICE VISIT (OUTPATIENT)
Dept: FAMILY MEDICINE | Facility: CLINIC | Age: 32
End: 2022-03-10
Payer: COMMERCIAL

## 2022-03-10 VITALS
DIASTOLIC BLOOD PRESSURE: 85 MMHG | WEIGHT: 288.8 LBS | HEIGHT: 75 IN | SYSTOLIC BLOOD PRESSURE: 145 MMHG | TEMPERATURE: 98 F | BODY MASS INDEX: 35.91 KG/M2 | RESPIRATION RATE: 14 BRPM

## 2022-03-10 DIAGNOSIS — H10.9 BACTERIAL CONJUNCTIVITIS OF RIGHT EYE: Primary | ICD-10-CM

## 2022-03-10 PROCEDURE — 99213 OFFICE O/P EST LOW 20 MIN: CPT | Performed by: FAMILY MEDICINE

## 2022-03-10 RX ORDER — ERYTHROMYCIN 5 MG/G
0.5 OINTMENT OPHTHALMIC 4 TIMES DAILY
Qty: 14 G | Refills: 0 | Status: SHIPPED | OUTPATIENT
Start: 2022-03-10 | End: 2022-03-17

## 2022-03-10 ASSESSMENT — PAIN SCALES - GENERAL: PAINLEVEL: NO PAIN (0)

## 2022-03-10 NOTE — PATIENT INSTRUCTIONS
Patient Education     Bacterial Conjunctivitis    You have an infection in the membranes covering the white part of the eye. This part of the eye is called the conjunctiva. The infection is called conjunctivitis. The most common symptoms of conjunctivitis include a thick, pus-like discharge from the eye, swollen eyelids, redness, eyelids sticking together upon awakening, and a gritty or scratchy feeling in the eye. Your infection was caused by bacteria. It may be treated with medicine. With treatment, the infection takes about 7 to 10 days to resolve.   Home care    Use prescribed antibiotic eye drops or ointment as directed to treat the infection.    Apply a warm compress (towel soaked in warm water) to the affected eye 3 to 4 times a day. Do this just before applying medicine to the eye.    Use a warm, wet cloth to wipe away crusting of the eyelids in the morning. This is caused by mucus drainage during the night. You may also use saline irrigating solution or artificial tears to rinse away mucus in the eye. Do not put a patch over the eye.    Wash your hands before and after touching the infected eye. This is to prevent spreading the infection to the other eye, and to other people. Don't share your towels or washcloths with others.    You may use acetaminophen or ibuprofen to control pain, unless another medicine was prescribed. Talk with your healthcare provider before using these medicines if you have chronic liver or kidney disease. Also talk with your provider if you have ever had a stomach ulcer or digestive bleeding.    Don't wear contact lenses until your eyes have healed and all symptoms are gone.    Follow-up care  Follow up with your healthcare provider, or as advised.  When to seek medical advice  Call your healthcare provider right away if any of these occur:    Worsening vision    Increasing pain in the eye    Increasing swelling or redness of the eyelid    Redness spreading around the  eye  [facility] last reviewed this educational content on 4/1/2020 2000-2021 [employer]. All rights reserved. This information is not intended as a substitute for professional medical care. Always follow your healthcare professional's instructions.

## 2022-03-10 NOTE — PROGRESS NOTES
Assessment & Plan     Bacterial conjunctivitis of right eye  Work note provided. Encouraged warm compresses, avoid contact with other eye, clean pillow case and linens. Please be re-evaluated if no imrpovement  - erythromycin (ROMYCIN) 5 MG/GM ophthalmic ointment  Dispense: 14 g; Refill: 0        No follow-ups on file.    MARILU NOLAND DO  [facility]    Stephanie Mercado is a 32 year old who presents for the following health issues.    HPI     Eye(s) Problem  Onset/Duration: 2 day of redness, discharge started yesterday  Description:   Location: right eye.  Was working out the other night and laid down on the yoga mat, dog came up and licked his forehead and eye  Pain: no  Redness: YES  Accompanying Signs & Symptoms:  Discharge/mattering: YES  Swelling: YES  Visual changes: YES- blurry off/on  Fever: no  Nasal Congestion: no  Bothered by bright lights: no  History:  Trauma: no  Foreign body exposure: no  Wearing contacts: no  Precipitating or alleviating factors: None  Therapies tried and outcome: None    Dog was licking patient eye after working out. No other systemic symtpoms.    Review of Systems   Constitutional, HEENT, cardiovascular, pulmonary, gi and gu systems are negative, except as otherwise noted.      Objective    There were no vitals taken for this visit.  There is no height or weight on file to calculate BMI.  Physical Exam  Constitutional:       Appearance: Normal appearance.   HENT:      Head: Normocephalic.      Nose: Nose normal.      Mouth/Throat:      Mouth: Mucous membranes are moist.   Eyes:      General:         Right eye: Discharge present.      Extraocular Movements: Extraocular movements intact.      Pupils: Pupils are equal, round, and reactive to light.      Comments: Right eye red conjunctiva     Pulmonary:      Effort: Pulmonary effort is normal.   Skin:     General: Skin is warm and dry.   Neurological:      Mental Status: He is alert.   Psychiatric:          Thought Content: Thought content normal.         Judgment: Judgment normal.

## 2022-03-10 NOTE — LETTER
March 10, 2022      Rogelio Noel  [address] [facility] [address]        To Whom It May Concern:    Rogelio Noel  was seen on 3/10/22.  Please excuse him  until 3/14/22 due to illness.        Sincerely,        MARILU NOLAND, DO

## 2022-03-14 ENCOUNTER — TELEPHONE (OUTPATIENT)
Dept: FAMILY MEDICINE | Facility: CLINIC | Age: 32
End: 2022-03-14
Payer: COMMERCIAL

## 2022-03-14 NOTE — TELEPHONE ENCOUNTER
Patient Quality Outreach    Patient is due for the following:   Hypertension -  BP check    NEXT STEPS:   checking bp at home? if not needs bp check     Type of outreach:    Phone, left message for patient/parent to call back.      Questions for provider review:    None     Giana Lopez MA

## 2022-05-04 ENCOUNTER — LAB (OUTPATIENT)
Dept: FAMILY MEDICINE | Facility: CLINIC | Age: 32
End: 2022-05-04
Attending: FAMILY MEDICINE
Payer: COMMERCIAL

## 2022-05-04 DIAGNOSIS — Z20.822 SUSPECTED 2019 NOVEL CORONAVIRUS INFECTION: ICD-10-CM

## 2022-05-04 LAB — SARS-COV-2 RNA RESP QL NAA+PROBE: NEGATIVE

## 2022-05-04 PROCEDURE — 99207 PR NO CHARGE LOS: CPT

## 2022-05-04 PROCEDURE — U0003 INFECTIOUS AGENT DETECTION BY NUCLEIC ACID (DNA OR RNA); SEVERE ACUTE RESPIRATORY SYNDROME CORONAVIRUS 2 (SARS-COV-2) (CORONAVIRUS DISEASE [COVID-19]), AMPLIFIED PROBE TECHNIQUE, MAKING USE OF HIGH THROUGHPUT TECHNOLOGIES AS DESCRIBED BY CMS-2020-01-R: HCPCS

## 2022-05-04 PROCEDURE — U0005 INFEC AGEN DETEC AMPLI PROBE: HCPCS

## 2022-05-05 ENCOUNTER — VIRTUAL VISIT (OUTPATIENT)
Dept: INTERNAL MEDICINE | Facility: CLINIC | Age: 32
End: 2022-05-05
Payer: COMMERCIAL

## 2022-05-05 DIAGNOSIS — J98.9 RESPIRATORY ILLNESS: Primary | ICD-10-CM

## 2022-05-05 DIAGNOSIS — Z11.59 NEED FOR HEPATITIS C SCREENING TEST: ICD-10-CM

## 2022-05-05 DIAGNOSIS — Z11.4 SCREENING FOR HIV (HUMAN IMMUNODEFICIENCY VIRUS): ICD-10-CM

## 2022-05-05 PROCEDURE — 99203 OFFICE O/P NEW LOW 30 MIN: CPT | Mod: GT | Performed by: INTERNAL MEDICINE

## 2022-05-05 NOTE — PROGRESS NOTES
Rogelio is a 32 year old who is being evaluated via a billable video visit.      How would you like to obtain your AVS? MyChart  If the video visit is dropped, the invitation should be resent by: Send to e-mail at: [email or web address]Charge Payment  Will anyone else be joining your video visit? No    Video Start Time: 9:31am    Assessment & Plan     (J98.9) Respiratory illness  (primary encounter diagnosis)  Comment: sinus vs other viral illness  Plan: amoxicillin-clavulanate (AUGMENTIN) 875-125 MG         tablet        Sx for the last 4 days, if no improvement, then okay to start in on abx. Discussed in detail plan of care. OTC remedies for now.  Letter for work composed. He will print off at home.      Return in about 1 week (around 5/12/2022), or if symptoms worsen or fail to improve.    Luis Antonio Helton MD  [facility]   Rogelio is a 32 year old who presents for the following health issues   HPI   Pt visits virtually due to congestion, cough, low grade fever.  Negative covid test yesterday  Sx for the last 3-4 days now.   Has been off of work for 3 days now.      Objective           Vitals:  No vitals were obtained today due to virtual visit.    Physical Exam   GENERAL: sounds congestion/nasally. Intermittent cough.  EYES: Eyes grossly normal to inspection.  No discharge or erythema, or obvious scleral/conjunctival abnormalities.  RESP: No audible wheeze, cough, or visible cyanosis.  No visible retractions or increased work of breathing.    SKIN: Visible skin clear. No significant rash, abnormal pigmentation or lesions.  NEURO: Cranial nerves grossly intact.  Mentation and speech appropriate for age.  PSYCH: Mentation appears normal, affect normal/bright, judgement and insight intact, normal speech and appearance well-groomed.          Video-Visit Details    Type of service:  Video Visit    Video End Time:9:46am    Originating Location (pt. Location): Home    Distant Location (provider  location):  [facility]     Platform used for Video Visit: Rain

## 2022-05-05 NOTE — LETTER
[facility]  [address] [place] [address]  [phone]          May 5, 2022    RE:  Rogelio Noel                                                                                                                                                       [id number] [address] [facility] [address]            To whom it may concern:    Rogelio Noel is under my professional care for a respiratory illness. He has been unable to work starting May 3, 2022. He may return to work on Monday, May 9, 2022.    Sincerely,        Luis Antonio Helton MD

## 2022-05-09 ENCOUNTER — TELEPHONE (OUTPATIENT)
Dept: FAMILY MEDICINE | Facility: CLINIC | Age: 32
End: 2022-05-09
Payer: COMMERCIAL

## 2022-05-09 NOTE — TELEPHONE ENCOUNTER
Patient Quality Outreach    Patient is due for the following:   Hypertension -  BP check    NEXT STEPS:   No follow up needed at this time.    Type of outreach:    Phone, left message for patient/parent to call back.      Questions for provider review:    None     Giana Lopez MA

## 2022-05-10 ENCOUNTER — TELEPHONE (OUTPATIENT)
Dept: FAMILY MEDICINE | Facility: CLINIC | Age: 32
End: 2022-05-10
Payer: COMMERCIAL

## 2022-05-10 NOTE — TELEPHONE ENCOUNTER
05-05-22 VV-       (J98.9) Respiratory illness  (primary encounter diagnosis)  Comment: sinus vs other viral illness  Plan: amoxicillin-clavulanate (AUGMENTIN) 875-125 MG         tablet        Sx for the last 4 days, if no improvement, then okay to start in on abx. Discussed in detail plan of care. OTC remedies for now.  Letter for work composed. He will print off at home.       States started Augmentin, sx improved. Has been fever free since 05-06-22 without tyl/ ibu. Lingering dry cough, no chest congestion. Feeling better, appetite improved, worked out yesterday. Has worked from home past 2 days. Asking if OK to return to in person work tomorrow.  Advised based on improved sx, fever free OK to return to work. Advise to follow infection control precautions.  CHARLIE Martinez RN

## 2022-08-22 ENCOUNTER — OFFICE VISIT (OUTPATIENT)
Dept: PALLIATIVE MEDICINE | Facility: CLINIC | Age: 32
End: 2022-08-22
Payer: COMMERCIAL

## 2022-08-22 VITALS — DIASTOLIC BLOOD PRESSURE: 81 MMHG | HEART RATE: 93 BPM | SYSTOLIC BLOOD PRESSURE: 164 MMHG

## 2022-08-22 DIAGNOSIS — M54.16 LUMBAR RADICULOPATHY: Primary | ICD-10-CM

## 2022-08-22 PROCEDURE — 99213 OFFICE O/P EST LOW 20 MIN: CPT | Performed by: STUDENT IN AN ORGANIZED HEALTH CARE EDUCATION/TRAINING PROGRAM

## 2022-08-22 ASSESSMENT — PAIN SCALES - GENERAL: PAINLEVEL: NO PAIN (0)

## 2022-08-22 NOTE — PROGRESS NOTES
Rogelio Noel  :  1990  DOS: 2022  MRN: [id number]    Pain Procedure Consult Note    PCP: Mitesh Bai     Rogelio Noel is a 32 year old male with a history of HTN referred by No ref. provider found for evaluation of procedure for treatment of low back pain     Bad pain . Bad for 3-4 weeks. Improved. Then worsened in late  to early July when his dad had surgery. Pain was same as at our prior visit. THe radiating pain down the posterior thigh was better, but he had constant pain in L buttock. Worse with prolonged sitting. Noticing it when he sits. No numbness. Left leg can give way when he gets out of bed in the morning or after getting up out of a chair. Occasionally present when he does activities. Still trying to be active, play hockey.     Last seen by me in clinic on 2021 when he described 2 months of left-sided buttock pain.  It radiated down the left posterior thigh.  Worse with prolonged sitting    Other evaluation and/or treatments so far consists of:   Weight lifting as a teenager -no longer enjoys this so stopped doing it  Yoga  Hockey  Beach body w/o  APAP 500 mg #2-3 per day - bad hiccups, ?heart burn      Doesn't like taking any medication  - ibuprofen 600mg daily - H.      Anticoagulants:  - n    Injections:   -Left L5 TFESI 2021, 2021    History of Surgery in the painful area:   - N    Social History: Social History: Lives in [place] with wife Camelia.  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  No past surgical history on file.  Family History   Problem Relation Age of Onset     Cancer Mother      Diabetes Mother      Hypertension Mother      Obesity Mother      Diabetes Father      Hypertension Father      Obesity Father      Hypertension Brother        Objective  BP (!) 164/81   Pulse 93       General: healthy, alert and in no distress      HEENT: no scleral icterus or conjunctival erythema      Skin: no suspicious lesions or rash. No jaundice.     CV: normal rate      Resp: normal respiratory effort without conversational dyspnea     Psych: normal mood and affect      Gait: nonantalgic, appropriate coordination and balance     Radiology:  MRI Lumbar spine 4/7/2021  IMPRESSION:  1. Multilevel degenerative findings in the lumbar spine superimposed  on developmental upper to mid lumbar spinal canal stenosis, as  described.  2. Multilevel spinal canal stenosis, moderate in degree at L3-L4 and  mild-to-moderate/moderate degree at L1-L2, and mild/mild-to-moderate  in degree at L2-L3. Varying degrees of multilevel lateral recess  stenosis, as described.  3. Mild/mild-to-moderate multilevel neural foraminal stenosis.    Assessment:  1. Lumbar radiculopathy         Left buttock and posterior thigh pain consistent with L5/S1 left eccentric disc bulge and left lateral recess stenosis.     Plan:  Discussed the assessment with the patient.  Repeat Epidural Steroid injection Left L5 Transforaminal epidural steroid injection   Follow up: for procedure and then with primary care provider. Repeat procedure up to q3 mo PRN with Dr. Bond    BILLING TIME DOCUMENTATION:   The total TIME spent on this patient on the date of the encounter/appointment was 20 minutes.      TOTAL TIME includes:   Time spent preparing to see the patient (reviewing records and tests) - 2 min  Time spent face to face (or over the phone) with the patient - 14 min  Time spent ordering tests, medications, procedures and referrals - 0 min  Time spent Referring and communicating with other healthcare professionals - 1 min  Time spent documenting clinical information in Epic - 3 min     Pennie Clark MD  [facility]     Disclaimer: This note consists of symbols derived from keyboarding, dictation and/or voice recognition software. As a result, there may be errors in the script that have gone undetected. Please consider this  when interpreting information found in this chart.

## 2022-08-22 NOTE — PATIENT INSTRUCTIONS
We will repeat your epidural steroid injection. You will be called to schedule this.   Do Yoga  If you need a repeat procedure, you can either ask Dr. Bai to place an order for a repeat or you can call our clinic to ask for a repeat procedure. Dr. Bond in [place] is excellent.   Pennie Clark MD  [facility]     ----------------------------------------------------------------  Clinic Number:  [phone]   Call with any questions about your care and for scheduling assistance.   Calls are returned Monday through Friday between 8 AM and 4:30 PM. We usually get back to you within 2 business days depending on the issue/request.    If we are prescribing your medications:  For opioid medication refills, call the clinic or send a Scheduling Employee Scheduling Software message 7 days in advance.  Please include:  Name of requested medication  Name of the pharmacy.  For non-opioid medications, call your pharmacy directly to request a refill. Please allow 3-4 days to be processed.   Per [place] State Law:  All controlled substance prescriptions must be filled within 30 days of being written.    For those controlled substances allowing refills, pickup must occur within 30 days of last fill.      We believe regular attendance is key to your success in our program!    Any time you are unable to keep your appointment we ask that you call us at least 24 hours in advance to cancel.This will allow us to offer the appointment time to another patient.   Multiple missed appointments may lead to dismissal from the clinic.

## 2022-09-26 NOTE — PROGRESS NOTES
"Pre procedure Diagnosis: lumbar radiculopathy   Post procedure Diagnosis: Same  Procedure performed: Left L5 transforaminal epidural steroid injection   Anesthesia: none  Complications: none  Operators: Pennie Clark MD     Needle: 25g 5\"  Injectate: 1ml 0.25% bupivacaine, 10mg of dexamethasone    Indications:   Rogelio Noel is a 31 year old male who is well known to me from previous visits, here for left L5 TFESI.  He has a history of left low back, buttock and posterior thigh pain improved after previous left L5 Transforaminal epidural steroid injection.  Exam shows positive left femoral stretch test and he has tried conservative treatment including yoga and other activities.    MRI was done on 4/7/2021 which showed   L5-S1: Disc desiccation with mild disc height loss. Diffuse disc bulge slightly eccentric to the left with probable superimposed central disc protrusion component. Mild--to-moderate facet arthropathy. Moderate left lateral recess stenosis, including mild mass effect and slight asymmetric posterior displacement of the traversing left S1 nerve root (series 5 image 43) mild right lateral recess stenosis. No central spinal canal stenosis. Mild left and minimal right neural foraminal Stenosis.    Options/alternatives, benefits and risks were discussed with the patient including bleeding, infection, tissue trauma, numbness, weakness, paralysis, spinal cord injury, radiation exposure, headache and reaction to medications. Questions were answered to his satisfaction and he agrees to proceed. Voluntary informed consent was obtained and signed.     Vitals were reviewed: Yes  Allergies were reviewed:  Yes   Medications were reviewed:  Yes   Pre-procedure pain score: 6/10    Procedure:  After getting informed consent, patient was brought into the procedure suite and was placed in a prone position on the procedure table.   A Pause for the Cause was performed.  Patient was prepped and draped in sterile " fashion.     After identifying the left L5 neuroforamen, the C-arm was rotated to a left lateral oblique angle. A 25 gauge 5 inch spinal needle was placed coaxial with the fluoroscopy beam and overriding the superior aspect of the neuroforamen. The spinal needle was advanced under intermittent fluoroscopy until it entered the foramen superiorly.    The position was then inspected from anteroposterior and lateral views, and the needle adjusted appropriately.  A total of 1ml of Isovue 200 was injected, confirming appropriate position, with spread into the epidural space, with no intravascular uptake. 9ml was wasted    1ml of 0.25% bupivacaine and 10mg of dexamethasone were injected from separate syringes.  The needle was flushed with lidocaine and removed.    During the procedure, there was not a paresthesia.   Hemostasis was achieved, the area was cleaned, and bandaids were placed when appropriate.  The patient tolerated the procedure well, and was taken to the recovery room.    Images were saved to PACS.    Post-procedure pain score: 0/10  Follow-up includes:   -f/u with primary care provider   -if Pt does not obtain sufficient or long-lasting relief from this injection, would consider a left S1 Transforaminal epidural steroid injection    Pennie Clark MD  [facility]

## 2022-09-27 ENCOUNTER — HOSPITAL ENCOUNTER (OUTPATIENT)
Dept: PALLIATIVE MEDICINE | Facility: CLINIC | Age: 32
Discharge: HOME | End: 2022-09-27
Attending: STUDENT IN AN ORGANIZED HEALTH CARE EDUCATION/TRAINING PROGRAM | Admitting: STUDENT IN AN ORGANIZED HEALTH CARE EDUCATION/TRAINING PROGRAM
Payer: COMMERCIAL

## 2022-09-27 VITALS
SYSTOLIC BLOOD PRESSURE: 146 MMHG | RESPIRATION RATE: 17 BRPM | HEART RATE: 81 BPM | DIASTOLIC BLOOD PRESSURE: 84 MMHG | TEMPERATURE: 96.8 F

## 2022-09-27 DIAGNOSIS — M54.16 LUMBAR RADICULOPATHY: ICD-10-CM

## 2022-09-27 PROCEDURE — 255N000002 HC RX 255 OP 636: Performed by: STUDENT IN AN ORGANIZED HEALTH CARE EDUCATION/TRAINING PROGRAM

## 2022-09-27 PROCEDURE — 64483 NJX AA&/STRD TFRM EPI L/S 1: CPT | Mod: LT | Performed by: STUDENT IN AN ORGANIZED HEALTH CARE EDUCATION/TRAINING PROGRAM

## 2022-09-27 PROCEDURE — 250N000009 HC RX 250: Performed by: STUDENT IN AN ORGANIZED HEALTH CARE EDUCATION/TRAINING PROGRAM

## 2022-09-27 PROCEDURE — 250N000011 HC RX IP 250 OP 636: Performed by: STUDENT IN AN ORGANIZED HEALTH CARE EDUCATION/TRAINING PROGRAM

## 2022-09-27 RX ORDER — IOPAMIDOL 408 MG/ML
1 INJECTION, SOLUTION INTRATHECAL ONCE
Status: COMPLETED | OUTPATIENT
Start: 2022-09-27 | End: 2022-09-27

## 2022-09-27 RX ORDER — DEXAMETHASONE SODIUM PHOSPHATE 10 MG/ML
10 INJECTION, SOLUTION INTRAMUSCULAR; INTRAVENOUS ONCE
Status: COMPLETED | OUTPATIENT
Start: 2022-09-27 | End: 2022-09-27

## 2022-09-27 RX ORDER — BUPIVACAINE HYDROCHLORIDE 2.5 MG/ML
1 INJECTION, SOLUTION EPIDURAL; INFILTRATION; INTRACAUDAL ONCE
Status: COMPLETED | OUTPATIENT
Start: 2022-09-27 | End: 2022-09-27

## 2022-09-27 RX ORDER — LIDOCAINE HYDROCHLORIDE 10 MG/ML
1 INJECTION, SOLUTION EPIDURAL; INFILTRATION; INTRACAUDAL; PERINEURAL ONCE
Status: COMPLETED | OUTPATIENT
Start: 2022-09-27 | End: 2022-09-27

## 2022-09-27 RX ADMIN — IOPAMIDOL 1 ML: 408 INJECTION, SOLUTION INTRATHECAL at 14:41

## 2022-09-27 RX ADMIN — DEXAMETHASONE SODIUM PHOSPHATE 10 MG: 10 INJECTION, SOLUTION INTRAMUSCULAR; INTRAVENOUS at 14:41

## 2022-09-27 RX ADMIN — LIDOCAINE HYDROCHLORIDE 1 ML: 10 INJECTION, SOLUTION EPIDURAL; INFILTRATION; INTRACAUDAL; PERINEURAL at 14:40

## 2022-09-27 RX ADMIN — SODIUM BICARBONATE 0.5 MEQ: 84 INJECTION, SOLUTION INTRAVENOUS at 14:41

## 2022-09-27 RX ADMIN — BUPIVACAINE HYDROCHLORIDE 2.5 MG: 2.5 INJECTION, SOLUTION EPIDURAL; INFILTRATION; INTRACAUDAL; PERINEURAL at 14:40

## 2022-09-27 NOTE — PROGRESS NOTES
Pre-procedure Intake  If YES to any questions or NO to having a   Please complete laminated checklist and leave on the computer keyboard for Provider, verbally inform provider if able.    For SCS Trial, RFA's or any sedation procedure:  Have you been fasting. NA    If yes, for how long?     Are you taking any any blood thinners such as Coumadin, Warfarin, Jantoven, Pradaxa Xarelto, Eliquis, Edoxaban, Enoxaparin, Lovenox, Heparin, Arixtra, Fondaparinux, or Fragmin? OR Antiplatelet medication such as Plavix, Brilinta, or Effient?   No     If yes, when did you take your last dose?     Do you take aspirin?  No    If cervical procedure, have you held aspirin for 6 days?   NA    Do you have any allergies to contrast dye, iodine, steroid and/or numbing medications?  NO    Are you currently taking antibiotics or have an active infection?  NO    Have you had a fever/elevated temperature within the past week? NO    Are you currently taking oral steroids? NO    Do you have a ? Yes    Are you pregnant or breastfeeding?  Not Applicable    Have you received the COVID-19 vaccine? Yes    If yes, was it your 1st, 2nd or only dose needed? 1st    Date of most recent vaccine: 12/2021    Notify provider and RNs if systolic BP >170, diastolic BP >100, P >100 or O2 sats < 90%

## 2022-09-27 NOTE — DISCHARGE INSTRUCTIONS
[facility]   Procedure Discharge Instructions    Today you saw:     Dr. Pennie Clark    You had an:  Lumbar Epidural steroid injection       Medications used:  Lidocaine   Bupivacaine   Dexamethasone   IsoVue   Sodium Bicarb          Be cautious when walking. Numbness and/or weakness in the lower extremity may occur for up to 6-8 hours after the procedure due to effect of the local anesthetic  Do not drive for 6 hours. The effect of the local anesthetic could slow your reflexes.   You may resume your regular activities after 24 hours  Avoid strenuous activity for the first 24 hours  You may shower, however avoid swimming, tub baths or hot tubs for 24 hours following your procedure  You may have a mild to moderate increase in pain for several days following the injection.  It may take up to 14 days for the steroid medication to start working although you may feel the effect as early as a few days after the procedure.     You may use ice packs for 10-15 minutes, 3 to 4 times a day at the injection site for comfort  Do not use heat to painful areas for 6 to 8 hours. This will give the local anesthetic time to wear off and prevent you from accidentally burning your skin.   Unless you have been directed to avoid the use of anti-inflammatory medications (NSAIDS), you may use medications such as ibuprofen, Aleve or Tylenol for pain control if needed.   Possible side effects of steroids that you may experience include flushing, elevated blood pressure, increased appetite, mild headaches and restlessness.  All of these symptoms will get better with time.  If you experience any of the following, call the Pain Clinic during work hours (Mon-Friday 8-4:30 pm) at [phone] or the Provider Line after hours at [phone]:  -Fever over 100 degree F  -Swelling, bleeding, redness, drainage, warmth at the injection site  -Progressive weakness or numbness in your leg  -Unusual new onset of pain  that is not improving

## 2022-12-07 ENCOUNTER — TELEPHONE (OUTPATIENT)
Dept: FAMILY MEDICINE | Facility: CLINIC | Age: 32
End: 2022-12-07

## 2022-12-07 DIAGNOSIS — M54.50 LEFT LOW BACK PAIN, UNSPECIFIED CHRONICITY, UNSPECIFIED WHETHER SCIATICA PRESENT: Primary | ICD-10-CM

## 2022-12-07 DIAGNOSIS — I10 BENIGN ESSENTIAL HYPERTENSION: ICD-10-CM

## 2022-12-07 RX ORDER — LISINOPRIL 20 MG/1
20 TABLET ORAL DAILY
Qty: 30 TABLET | Refills: 0 | Status: SHIPPED | OUTPATIENT
Start: 2022-12-07 | End: 2022-12-12

## 2022-12-07 NOTE — TELEPHONE ENCOUNTER
Order/Referral Request    Who is requesting: Pt    Orders being requested: Pain Management Referral for injections    Reason service is needed/diagnosis: Pt tried setting up an appt to have an injection done at the pain clinic but pain clinic is telling pt he needs an updated order from Dr. Bai before he can scheduled anything. Pt is upset about this, and would like to get this figured out as soon as possible.    When are orders needed by: as soon as possible      Could we send this information to you in [employer] or would you prefer to receive a phone call?:   Patient would prefer a phone call   Okay to leave a detailed message?: Yes at Home number on file [phone] (home)

## 2022-12-07 NOTE — TELEPHONE ENCOUNTER
Patient had an appt with Dr. Bai on 12/7/22. Patient had to reschedule. Is out of medications as of today. Would like a daphne refill to make it to appointment on 12/12 with Dr. Bai

## 2022-12-12 ENCOUNTER — OFFICE VISIT (OUTPATIENT)
Dept: FAMILY MEDICINE | Facility: CLINIC | Age: 32
End: 2022-12-12
Payer: COMMERCIAL

## 2022-12-12 VITALS
RESPIRATION RATE: 12 BRPM | SYSTOLIC BLOOD PRESSURE: 130 MMHG | WEIGHT: 298 LBS | HEART RATE: 82 BPM | BODY MASS INDEX: 37.05 KG/M2 | HEIGHT: 75 IN | TEMPERATURE: 97.9 F | OXYGEN SATURATION: 96 % | DIASTOLIC BLOOD PRESSURE: 74 MMHG

## 2022-12-12 DIAGNOSIS — R73.09 ELEVATED GLUCOSE: ICD-10-CM

## 2022-12-12 DIAGNOSIS — M54.50 LEFT LOW BACK PAIN, UNSPECIFIED CHRONICITY, UNSPECIFIED WHETHER SCIATICA PRESENT: ICD-10-CM

## 2022-12-12 DIAGNOSIS — Z23 NEED FOR PROPHYLACTIC VACCINATION AND INOCULATION AGAINST INFLUENZA: ICD-10-CM

## 2022-12-12 DIAGNOSIS — I10 BENIGN ESSENTIAL HYPERTENSION: Primary | ICD-10-CM

## 2022-12-12 DIAGNOSIS — E66.01 MORBID OBESITY (H): ICD-10-CM

## 2022-12-12 DIAGNOSIS — M25.512 ACUTE PAIN OF LEFT SHOULDER: ICD-10-CM

## 2022-12-12 DIAGNOSIS — Z23 HIGH PRIORITY FOR 2019-NCOV VACCINE: ICD-10-CM

## 2022-12-12 LAB
ANION GAP SERPL CALCULATED.3IONS-SCNC: 11 MMOL/L (ref 7–15)
BUN SERPL-MCNC: 25 MG/DL (ref 6–20)
CALCIUM SERPL-MCNC: 9.7 MG/DL (ref 8.6–10)
CHLORIDE SERPL-SCNC: 103 MMOL/L (ref 98–107)
CREAT SERPL-MCNC: 1.12 MG/DL (ref 0.67–1.17)
DEPRECATED HCO3 PLAS-SCNC: 27 MMOL/L (ref 22–29)
GFR SERPL CREATININE-BSD FRML MDRD: 90 ML/MIN/1.73M2
GLUCOSE SERPL-MCNC: 115 MG/DL (ref 70–99)
HBA1C MFR BLD: 5.1 % (ref 0–5.6)
POTASSIUM SERPL-SCNC: 4.3 MMOL/L (ref 3.4–5.3)
SODIUM SERPL-SCNC: 141 MMOL/L (ref 136–145)

## 2022-12-12 PROCEDURE — 99214 OFFICE O/P EST MOD 30 MIN: CPT | Performed by: FAMILY MEDICINE

## 2022-12-12 PROCEDURE — 80048 BASIC METABOLIC PNL TOTAL CA: CPT | Performed by: FAMILY MEDICINE

## 2022-12-12 PROCEDURE — 36415 COLL VENOUS BLD VENIPUNCTURE: CPT | Performed by: FAMILY MEDICINE

## 2022-12-12 PROCEDURE — 83036 HEMOGLOBIN GLYCOSYLATED A1C: CPT | Performed by: FAMILY MEDICINE

## 2022-12-12 RX ORDER — NAPROXEN 500 MG/1
500 TABLET ORAL 2 TIMES DAILY WITH MEALS
Qty: 28 TABLET | Refills: 1 | Status: SHIPPED | OUTPATIENT
Start: 2022-12-12 | End: 2023-02-21

## 2022-12-12 RX ORDER — LISINOPRIL 20 MG/1
20 TABLET ORAL DAILY
Qty: 90 TABLET | Refills: 3 | Status: SHIPPED | OUTPATIENT
Start: 2022-12-12 | End: 2023-12-19

## 2022-12-12 ASSESSMENT — PAIN SCALES - GENERAL: PAINLEVEL: NO PAIN (0)

## 2022-12-12 NOTE — PROGRESS NOTES
"Stephanie Mercado is a 32 year presenting for the following health issues:  No chief complaint on file.      History of Present Illness       Hypertension: He presents for follow up of hypertension.  He does not check blood pressure  regularly outside of the clinic. Outpatient blood pressures have not been over 140/90. He follows a low salt diet.         Htn; stable. Fills and labs  Chronic back pain: needs referral for pain eval for shots, doesn't just want what he had previously  Morbid obesity: 37 BMI with back pain and htn, added some weight  L shoulder pain: a few months.  Not sure what happened.  OK ROM, but hurts at night        Review of Systems         Objective    /74   Pulse 82   Temp 97.9  F (36.6  C) (Tympanic)   Resp 12   Ht 1.892 m (6' 2.5\")   Wt 135.2 kg (298 lb)   SpO2 96%   BMI 37.75 kg/m    Body mass index is 37.75 kg/m .  Physical Exam   GEN: Alert and oriented, in no acute distress  CV: RRR, no murmur  RESP: lungs clear bilaterally, good effort  EXT: no edema or lesions noted in lower extremities  Normal L shoulder exam, good strength and ROM ,no AC pain.    Back bothers when sitting.  No pain over spine    A: Htn; stable. Fills and labs  Chronic back pain: needs referral for pain eval for shots  Morbid obesity: 37 BMI with back pain and htn  L shoulder pain: a few months.    P: fills, labs.  Work on wt loss to help medical issues  Naproxen bid for 2 weeks for shoulder.  Consider therapy  Pain eval for back, decide on shots?      F/u prn          "

## 2022-12-12 NOTE — NURSING NOTE
"Chief Complaint   Patient presents with     Hypertension     Shoulder Pain     Left shoulder pain 5 months ago no known injury. Pain scale worse 10/10 today 0/10 sitting in chair       Initial /74   Pulse 82   Temp 97.9  F (36.6  C) (Tympanic)   Resp 12   Ht 1.892 m (6' 2.5\")   Wt 135.2 kg (298 lb)   SpO2 96%   BMI 37.75 kg/m   Estimated body mass index is 37.75 kg/m  as calculated from the following:    Height as of this encounter: 1.892 m (6' 2.5\").    Weight as of this encounter: 135.2 kg (298 lb).    Patient presents to the clinic using     Is there anyone who you would like to be able to receive your results? If yes have patient fill out BILLY    "

## 2023-02-20 NOTE — PROGRESS NOTES
[facility] Interventional Evaluation    Date of visit: 2/21/2023    Reason for consultation:    Rogelio Noel is a 33 year old male who is seen in consultation today for evaluation of an interventional strategy for pain management.    PCP is Mitesh Bai.    Please see the [facility] [place] health questionnaire which the patient completed and reviewed with me in detail.    Chief Complaint:    Chief Complaint   Patient presents with     Pain     Left butt all the way down to the leg. Pain comes and goes.        Pain history:  Rogelio Noel is a 33 year old male presenting with a chief pain complaint of low back pain    Onset: 2-2.5 years ago, no obvious inciting incident but bought a new car and went on a long road trip  Location: left low back  Provoking: prolonged sitting  Palliating: standing, laying down  Quality: shooting  Radiation: down posterior left thigh, stopping above the knee, sometimes will go down the whole leg  Severity: 0-10/10  Timing: intermittent  Numbness: none  Weakness: when going from sitting to standing feels like left leg will give out    Additionally having a sensation of numbness in the mid back for the last 6 months of putting a shirt on after hockey causing pain shooting from neck down to the mid back and associated numbness. May feel electric in nature.    Patient denies red flag symptoms of corresponding bowel/bladder symptoms, fever/chills, saddle anesthesia, profound motor loss, weight loss, or sudden unremitting increase in pain.    Current pain medications include:  Ibuprofen - helpful but not sure how much  Tylenol - helpful    Other treatments have included:  Rogelio Noel has been seen at a pain clinic in the past.  Dr. Clark  PT: doing yoga at home. Has not done formal PT  Injections:     L L5 TFESI x3 most recently 9/27/22 all with Dr. Clark - 1st helped 1 year, 2nd helped less, 3rd was very  difficult to tolerate and then got a few weeks of relief  Surgery: none  Alternative: none recently    Past Medical History:  No past medical history on file.  Patient Active Problem List    Diagnosis Date Noted     Elevated glucose 12/12/2022     Priority: Medium     Morbid obesity (H) 10/18/2021     Priority: Medium     Benign essential hypertension 06/01/2020     Priority: Medium       Past Surgical History:  No past surgical history on file.  Medications:  Current Outpatient Medications   Medication Sig Dispense Refill     lisinopril (ZESTRIL) 20 MG tablet Take 1 tablet (20 mg) by mouth daily 90 tablet 3     naproxen (NAPROSYN) 500 MG tablet Take 1 tablet (500 mg) by mouth 2 times daily (with meals) (Patient not taking: Reported on 2/21/2023) 28 tablet 1     Allergies:   No Known Allergies    Family history:  Family History   Problem Relation Age of Onset     Cancer Mother      Diabetes Mother      Hypertension Mother      Obesity Mother      Diabetes Father      Hypertension Father      Obesity Father      Hypertension Brother        Physical Exam:  Vitals:    02/21/23 1005   BP: (!) 144/90   Pulse: 93   SpO2: 96%     Exam:  Constitutional: Well developed, NAD  Head: normocephalic. Atraumatic.   Eyes: no redness or jaundice noted   CV: warm, well perfused extremities   Skin: no suspicious lesions or rashes   Psychiatric: mentation appears normal and affect full    Neuromuscular Examination:  Normal ROM in lumbar flexion, extension  Nontender to palpation of the midline lumbar spine or lumbar paraspinals bilaterally. +TTP to the left piriformis muscle  Negative SI joint tenderness bilaterally  Normal 5/5 strength in BLE   Normal sensation to light touch and pinprick in the L3-S1 distributions bilaterally   No ankle clonus bilaterally    KI: negative bilaterally   FADIR: negative bilaterally   Scour: negative bilaterally   Straight leg raise: negative bilaterally         Diagnostic tests:  MRI LUMBAR SPINE  WITHOUT CONTRAST   4/7/2021 8:40 AM      HISTORY: Low back pain, no red flags. Left low back pain, unspecified  chronicity, unspecified whether sciatica present. Gluteal pain.      TECHNIQUE: Multiplanar multisequence MRI of the lumbar spine without  contrast.      COMPARISON: None.      FINDINGS: Five lumbar vertebral bodies are presumed. Normal vertebral  body heights and sagittal alignment. Small area of focal fatty marrow  or intraosseous hemangioma in the left aspect of the L1 vertebral  body. Otherwise unremarkable bone marrow signal. Normal appearance of  the distal spinal cord with conus terminating at L2. There appears to  be some degree of diffuse developmental bony spinal canal stenosis  involving the upper to mid lumbar spine, which may be on the basis of  foreshortened pedicles. There is a nonspecific T2 hyperintense lesion  most likely representing a small cyst in the medial aspect of the  right kidney. The visualized bony pelvis is unremarkable.     Segmental analysis:  T12-L1: Normal disc height and signal. There appears to be a disc  bulge slightly eccentric to the left with probable superimposed small  right central disc protrusion. Mild facet arthropathy. Mild spinal  canal stenosis. No significant neural foraminal stenosis.     L1-L2: Normal disc height and signal. Symmetric disc bulge. Mild facet  arthropathy. Mild-to-moderate/moderate spinal canal stenosis, with  circumferential thecal sac narrowing and some degree of bunching of  the cauda equina nerve roots and conus into the central aspect of the  spinal canal. Mild-to-moderate right lateral recess narrowing with  contact of both the ventral and dorsal margins of the traversing right  L2 nerve root without nerve root displacement or overt compression.  Mild left neural foraminal stenosis. The right neural foramen is  patent.     L2-L3: Disc desiccation with mild disc height loss. There is a disc  bulge with posterior endplate osteophytic  ridging, eccentric to the  left. Mild facet arthropathy. Mild/mild-to-moderate spinal canal  stenosis with mild bilateral lateral recess stenosis, right more than  left. Mild bilateral neural foraminal stenosis.     L3-L4: Disc desiccation with mild disc height loss. Symmetric disc  bulge. Mild-to-moderate facet arthropathy. Moderate spinal canal  stenosis and moderate bilateral lateral recess stenosis. Mild  bilateral neural foraminal stenosis.     L4-L5: Normal disc height and signal. Small disc bulge slightly  eccentric to the left. Mild, left greater than right, lateral recess  stenosis with contact of both the ventral and dorsal margins of the  traversing, left greater than right, L5 nerve roots but no nerve root  displacement or overt compression. No central spinal canal stenosis.  Mild/mild-to-moderate left and mild right neural foraminal stenosis.     L5-S1: Disc desiccation with mild disc height loss. Diffuse disc bulge  slightly eccentric to the left with probable superimposed central disc  protrusion component. Mild--to-moderate facet arthropathy. Moderate  left lateral recess stenosis, including mild mass effect and slight  asymmetric posterior displacement of the traversing left S1 nerve root  (series 5 image 43) mild right lateral recess stenosis. No central  spinal canal stenosis. Mild left and minimal right neural foraminal  stenosis.                                                                      IMPRESSION:  1. Multilevel degenerative findings in the lumbar spine superimposed  on developmental upper to mid lumbar spinal canal stenosis, as  described.  2. Multilevel spinal canal stenosis, moderate in degree at L3-L4 and  mild-to-moderate/moderate degree at L1-L2, and mild/mild-to-moderate  in degree at L2-L3. Varying degrees of multilevel lateral recess  stenosis, as described.  3. Mild/mild-to-moderate multilevel neural foraminal stenosis.     CHERELLE SHEIKH MD    Personally reviewed imaging:  yes    [place] Prescription Monitoring Program reviewed: no    Outside records reviewed: no    Assessment:  1. Left piriformis syndrome  2. Left low back pain with sciatica    Rogelio Noel is a 33 year old male who presents with chronic low back pain on the left side with radiating pain down the posterior thigh, occasionally going down the entire leg when transitioning from seated to standing position.  Pain is worse with prolonged sitting.  Pain is also worse when keeping a wallet in the left back pocket.  He additionally notices that performing yoga helps, with improvement with a stretch that he demonstrated which effectively reproduced piriformis stretch, including hip flexion, internal rotation, and adduction.  On examination patient has tenderness to palpation of left piriformis muscle, but this does not reproduce the leg pain today.  He otherwise has a benign examination.  Hence, I suspect piriformis muscle strain resulting in piriformis syndrome with concurrent left-sided sciatica to be the primary cause of his pain.  Given his prior success with transforaminal epidural steroid injection, however, I also consider left L5 or S1 radiculopathy to be on the differential diagnosis.    Plan:  Diagnosis reviewed, treatment option addressed, and risk/benefits discussed.     1. Procedures: left piriformis injection  2. Physical Therapy: ordered for 2-3 visits, focus on home exercise program  3. Diagnostic Studies: none  4. Medication Management: ok to take tylenol as needed  5. Follow up: 3 months    34 minutes spent on the date of encounter doing chart review, history, and exam documentation and further activities as noted above.     Armaan Sanchez MD  Interventional Pain Medicine  [facility]

## 2023-02-21 ENCOUNTER — OFFICE VISIT (OUTPATIENT)
Dept: PALLIATIVE MEDICINE | Facility: OTHER | Age: 33
End: 2023-02-21
Attending: FAMILY MEDICINE
Payer: COMMERCIAL

## 2023-02-21 VITALS — DIASTOLIC BLOOD PRESSURE: 90 MMHG | SYSTOLIC BLOOD PRESSURE: 144 MMHG | HEART RATE: 93 BPM | OXYGEN SATURATION: 96 %

## 2023-02-21 DIAGNOSIS — G57.02 PIRIFORMIS SYNDROME, LEFT: Primary | ICD-10-CM

## 2023-02-21 DIAGNOSIS — G89.29 CHRONIC LEFT-SIDED LOW BACK PAIN WITH LEFT-SIDED SCIATICA: ICD-10-CM

## 2023-02-21 DIAGNOSIS — M54.50 LEFT LOW BACK PAIN, UNSPECIFIED CHRONICITY, UNSPECIFIED WHETHER SCIATICA PRESENT: ICD-10-CM

## 2023-02-21 DIAGNOSIS — M54.42 CHRONIC LEFT-SIDED LOW BACK PAIN WITH LEFT-SIDED SCIATICA: ICD-10-CM

## 2023-02-21 PROCEDURE — G0463 HOSPITAL OUTPT CLINIC VISIT: HCPCS

## 2023-02-21 PROCEDURE — 99203 OFFICE O/P NEW LOW 30 MIN: CPT | Performed by: STUDENT IN AN ORGANIZED HEALTH CARE EDUCATION/TRAINING PROGRAM

## 2023-02-21 PROCEDURE — G0463 HOSPITAL OUTPT CLINIC VISIT: HCPCS | Performed by: STUDENT IN AN ORGANIZED HEALTH CARE EDUCATION/TRAINING PROGRAM

## 2023-02-21 ASSESSMENT — ANXIETY QUESTIONNAIRES
3. WORRYING TOO MUCH ABOUT DIFFERENT THINGS: NOT AT ALL
IF YOU CHECKED OFF ANY PROBLEMS ON THIS QUESTIONNAIRE, HOW DIFFICULT HAVE THESE PROBLEMS MADE IT FOR YOU TO DO YOUR WORK, TAKE CARE OF THINGS AT HOME, OR GET ALONG WITH OTHER PEOPLE: NOT DIFFICULT AT ALL
GAD7 TOTAL SCORE: 0
4. TROUBLE RELAXING: NOT AT ALL
2. NOT BEING ABLE TO STOP OR CONTROL WORRYING: NOT AT ALL
5. BEING SO RESTLESS THAT IT IS HARD TO SIT STILL: NOT AT ALL
7. FEELING AFRAID AS IF SOMETHING AWFUL MIGHT HAPPEN: NOT AT ALL
7. FEELING AFRAID AS IF SOMETHING AWFUL MIGHT HAPPEN: NOT AT ALL
6. BECOMING EASILY ANNOYED OR IRRITABLE: NOT AT ALL
1. FEELING NERVOUS, ANXIOUS, OR ON EDGE: NOT AT ALL
8. IF YOU CHECKED OFF ANY PROBLEMS, HOW DIFFICULT HAVE THESE MADE IT FOR YOU TO DO YOUR WORK, TAKE CARE OF THINGS AT HOME, OR GET ALONG WITH OTHER PEOPLE?: NOT DIFFICULT AT ALL
GAD7 TOTAL SCORE: 0

## 2023-02-21 ASSESSMENT — PAIN SCALES - PAIN ENJOYMENT GENERAL ACTIVITY SCALE (PEG)
INTERFERED_GENERAL_ACTIVITY: 8
PEG_TOTALSCORE: 8
INTERFERED_ENJOYMENT_LIFE: 8
AVG_PAIN_PASTWEEK: 8
AVG_PAIN_PASTWEEK: 8
INTERFERED_ENJOYMENT_LIFE: 8
PEG_TOTALSCORE: 8
INTERFERED_GENERAL_ACTIVITY: 8

## 2023-02-21 ASSESSMENT — PAIN SCALES - GENERAL: PAINLEVEL: EXTREME PAIN (8)

## 2023-02-21 NOTE — PATIENT INSTRUCTIONS
Procedures: left piriformis injection  Physical Therapy: ordered for 2-3 visits, focus on home exercise program  Diagnostic Studies: none  Medication Management: ok to take tylenol as needed  Follow up: 3 months    Armaan Sanchez MD  Interventional Pain Medicine  [facility] Number:  [phone]  Call with any questions about your care and for scheduling assistance.   Calls are returned Monday through Friday between 8 AM and 4:30 PM. We usually get back to you within 2 business days depending on the issue/request.    If we are prescribing your medications:  For opioid medication refills, call the clinic or send a [facility] message 7 days in advance.  Please include:  Name of requested medication  Name of the pharmacy.  For non-opioid medications, call your pharmacy directly to request a refill. Please allow 3-4 days to be processed.   Per [place] State Law:  All controlled substance prescriptions must be filled within 30 days of being written.    For those controlled substances allowing refills, pickup must occur within 30 days of last fill.      We believe regular attendance is key to your success in our program!    Any time you are unable to keep your appointment we ask that you call us at least 24 hours in advance to cancel.This will allow us to offer the appointment time to another patient.   Multiple missed appointments may lead to dismissal from the clinic.

## 2023-02-21 NOTE — NURSING NOTE
Patient presents to the clinic today for a follow up with Armaan Sanchez MD  regarding Pain Management.       PEG Score 2/21/2023   PEG Total Score 6.67        Every once in awhile when he moves his arms up and back he feels an electrical jolt down the spine and will stop into the lower back. It does not happen every day- few days a week. Starts between the shoulder blades.       Susy Mckeon MA  [facility] [place]

## 2023-03-02 ENCOUNTER — TELEPHONE (OUTPATIENT)
Dept: PALLIATIVE MEDICINE | Facility: OTHER | Age: 33
End: 2023-03-02

## 2023-03-06 ENCOUNTER — TELEPHONE (OUTPATIENT)
Dept: PALLIATIVE MEDICINE | Facility: OTHER | Age: 33
End: 2023-03-06
Payer: COMMERCIAL

## 2023-03-06 DIAGNOSIS — G57.02 PIRIFORMIS SYNDROME, LEFT: Primary | ICD-10-CM

## 2023-03-08 ENCOUNTER — RADIOLOGY INJECTION OFFICE VISIT (OUTPATIENT)
Dept: PALLIATIVE MEDICINE | Facility: OTHER | Age: 33
End: 2023-03-08
Payer: COMMERCIAL

## 2023-03-08 VITALS
OXYGEN SATURATION: 97 % | DIASTOLIC BLOOD PRESSURE: 84 MMHG | HEART RATE: 89 BPM | SYSTOLIC BLOOD PRESSURE: 127 MMHG | RESPIRATION RATE: 16 BRPM

## 2023-03-08 DIAGNOSIS — G57.02 PIRIFORMIS SYNDROME, LEFT: ICD-10-CM

## 2023-03-08 DIAGNOSIS — M54.50 LEFT LOW BACK PAIN, UNSPECIFIED CHRONICITY, UNSPECIFIED WHETHER SCIATICA PRESENT: ICD-10-CM

## 2023-03-08 PROCEDURE — 255N000002 HC RX 255 OP 636: Performed by: STUDENT IN AN ORGANIZED HEALTH CARE EDUCATION/TRAINING PROGRAM

## 2023-03-08 PROCEDURE — 250N000011 HC RX IP 250 OP 636: Performed by: STUDENT IN AN ORGANIZED HEALTH CARE EDUCATION/TRAINING PROGRAM

## 2023-03-08 PROCEDURE — 20552 NJX 1/MLT TRIGGER POINT 1/2: CPT | Performed by: STUDENT IN AN ORGANIZED HEALTH CARE EDUCATION/TRAINING PROGRAM

## 2023-03-08 PROCEDURE — 96372 THER/PROPH/DIAG INJ SC/IM: CPT | Mod: 59 | Performed by: STUDENT IN AN ORGANIZED HEALTH CARE EDUCATION/TRAINING PROGRAM

## 2023-03-08 PROCEDURE — 77002 NEEDLE LOCALIZATION BY XRAY: CPT | Mod: 26 | Performed by: STUDENT IN AN ORGANIZED HEALTH CARE EDUCATION/TRAINING PROGRAM

## 2023-03-08 RX ORDER — TRIAMCINOLONE ACETONIDE 40 MG/ML
40 INJECTION, SUSPENSION INTRA-ARTICULAR; INTRAMUSCULAR ONCE
Status: COMPLETED | OUTPATIENT
Start: 2023-03-08 | End: 2023-03-08

## 2023-03-08 RX ADMIN — IOHEXOL 2 ML: 300 INJECTION, SOLUTION INTRAVENOUS at 11:47

## 2023-03-08 RX ADMIN — TRIAMCINOLONE ACETONIDE 40 MG: 40 INJECTION, SUSPENSION INTRA-ARTICULAR; INTRAMUSCULAR at 11:53

## 2023-03-08 ASSESSMENT — PAIN SCALES - GENERAL
PAINLEVEL: EXTREME PAIN (9)
PAINLEVEL: EXTREME PAIN (8)

## 2023-03-08 NOTE — NURSING NOTE
Pre-procedure Intake  If YES to any questions or NO to having a   Please complete laminated checklist and leave on the computer keyboard for Provider, verbally inform provider if able.    For SCS Trial, RFA's or any sedation procedure:  Have you been fasting? NA    If yes, for how long?     Are you taking any any blood thinners such as Coumadin, Warfarin, Jantoven, Pradaxa Xarelto, Eliquis, Edoxaban, Enoxaparin, Lovenox, Heparin, Arixtra, Fondaparinux, or Fragmin? OR Antiplatelet medication such as Plavix, Brilinta, or Effient?   No     If yes, when did you take your last dose?     Do you take aspirin?  No    If cervical procedure, have you held aspirin for 6 days?   NA    Do you have any allergies to contrast dye, iodine, steroid and/or numbing medications?  NO    Are you currently taking antibiotics or have an active infection?  NO    Have you had a fever/elevated temperature within the past week? NO    Are you currently taking oral steroids? NO    Do you have a ? Yes    Are you pregnant or breastfeeding?  Not Applicable    Have you received the COVID-19 vaccine? Yes    If yes, was it your 1st, 2nd or only dose needed? 1 dose    Date of most recent vaccine: 12/23/2021    Notify provider and RNs if systolic BP >170, diastolic BP >100, P >100 or O2 sats < 90%    Susy Mckeon MA  [facility]

## 2023-03-08 NOTE — PROGRESS NOTES
Pre procedure Diagnosis: myofascial pain, piriformis syndrome, sciatica  Post procedure Diagnosis: Same  Procedure performed: left piriformis injection  Anesthesia: none  Complications: none  Operators: Armaan Sanchez MD    Indications:   Rogelio Noel is a 33 year old male was sent by myself for piriformis syndrome/sciatica.     Options/alternatives, benefits and risks were discussed with the patient including bleeding, infection, tissue trauma, exposure to radiation, reaction to medications including seizure, nerve injury, weakness, and numbness. Questions were answered to his satisfaction and he agrees to proceed. Voluntary informed consent was obtained and signed.     Vitals were reviewed: Yes  Allergies were reviewed:  Yes   Medications were reviewed:  Yes   Pre-procedure pain score: 9/10    Procedure:  After getting informed consent, patient was brought into the procedure suite and was placed in a prone position on the procedure table.   A procedural pause was performed.  Patient was prepped and draped in sterile fashion.     A fluoroscopic view including the SI joint and the superiolateral border of the left acetabulum was obtained.  A location 1.5 cm inferior and 1.5cm lateral from the inferior border of the SI joint was marked. 4 ml of Lidocaine 1% was used to anesthetize the skin. A 25g 3.5 inch spinal needle was advanced towards the marked location. Once coaxial trajectory of the needle was achieved, the needle was advanced in the lateral view until the needle was felt to enter the piriformis musculature by tactile change or lateral imaging.  At this point, 1 mL of Omnipaque 300 was injected, with a flow consistent with piriformis muscle spread.    A solution containing 4ml of 0.5% ropivacaine and 40mg of kenalog was injected.  The needle was removed.      Hemostasis was achieved, the area was cleaned, and bandaids were placed when appropriate.  The patient tolerated the procedure well, and was taken  to the recovery room.    Images were saved to PACS.    Post-procedure pain score: 8/10  Follow-up includes:   -f/u with referring provider    Armaan Sanchez MD  Interventional Pain Medicine  [facility]

## 2023-03-08 NOTE — PATIENT INSTRUCTIONS
[facility] [place]   Procedure Discharge Instructions    Today you saw:    Dr. Sanchez    You had a Left piriformis injection     Medications used:  Lidocaine Omnipaque  Ropivicaine   Kenalog        If you were holding your blood thinning medication, please restart taking it: N/A  Be cautious when walking. Numbness and/or weakness in the lower extremities may occur for up to 6-8 hours after the procedure due to effect of the local anesthetic  Do not drive for 6 hours. The effect of the local anesthetic could slow your reflexes.   You may resume your regular activities after 24 hours  Avoid strenuous activity for the first 24 hours  You may shower, however avoid swimming, tub baths or hot tubs for 24 hours following your procedure  You may have a mild to moderate increase in pain for several days following the injection.  It may take up to 14 days for the steroid medication to start working although you may feel the effect as early as a few days after the procedure.     You may use ice packs for 10-15 minutes, 3 to 4 times a day at the injection site for comfort  Do not use heat to painful areas for 6 to 8 hours. This will give the local anesthetic time to wear off and prevent you from accidentally burning your skin.   Unless you have been directed to avoid the use of anti-inflammatory medications (NSAIDS), you may use medications such as ibuprofen, Aleve or Tylenol for pain control if needed.   If you were fasting, you may resume your normal diet and medications after the procedure  If you have diabetes, check your blood sugar more frequently than usual as your blood sugar may be higher than normal for 10-14 days following a steroid injection. Contact your doctor who manages your diabetes if your blood sugar is higher than usual  Possible side effects of steroids that you may experience include flushing, elevated blood pressure, increased appetite, mild headaches and restlessness.   All of these symptoms will get better with time.  If you experience any of the following, call the Pain Clinic at [phone]:  -Fever over 100 degree F  -Swelling, bleeding, redness, drainage, warmth at the injection site  -Progressive weakness or numbness in your legs or arms  -Loss of bowel or bladder function  -Unusual headache that is not relieved by Tylenol or other pain reliever  -Unusual new onset of pain that is not improving

## 2023-03-21 ENCOUNTER — HOSPITAL ENCOUNTER (OUTPATIENT)
Dept: PHYSICAL THERAPY | Facility: CLINIC | Age: 33
Setting detail: THERAPIES SERIES
Discharge: HOME | End: 2023-03-21
Attending: STUDENT IN AN ORGANIZED HEALTH CARE EDUCATION/TRAINING PROGRAM
Payer: COMMERCIAL

## 2023-03-21 DIAGNOSIS — M54.42 CHRONIC LEFT-SIDED LOW BACK PAIN WITH LEFT-SIDED SCIATICA: ICD-10-CM

## 2023-03-21 DIAGNOSIS — G89.29 CHRONIC LEFT-SIDED LOW BACK PAIN WITH LEFT-SIDED SCIATICA: ICD-10-CM

## 2023-03-21 DIAGNOSIS — G57.02 PIRIFORMIS SYNDROME, LEFT: ICD-10-CM

## 2023-03-21 PROCEDURE — 97110 THERAPEUTIC EXERCISES: CPT | Mod: GP | Performed by: PHYSICAL MEDICINE & REHABILITATION

## 2023-03-21 PROCEDURE — 97161 PT EVAL LOW COMPLEX 20 MIN: CPT | Mod: GP | Performed by: PHYSICAL MEDICINE & REHABILITATION

## 2023-03-21 NOTE — PROGRESS NOTES
"   03/21/23 0700   General Information   Type of Visit Initial OP Ortho PT Evaluation   Start of Care Date 03/21/23   Referring Physician Armaan Sanchez MD   Patient/Family Goals Statement improve pain with transfers (going from sitting to standing)   Orders Evaluate and Treat   Orders Comment \"left piriformis syndrome\"   Date of Order 02/21/23   Certification Required? No  (HP)   Medical Diagnosis Piriformis syndrome, left;  Chronic left-sided low back pain with left-sided sciatica   Surgical/Medical history reviewed Yes   Precautions/Limitations no known precautions/limitations   General Information Comments PMHx per pt report: high BP, arthritis, notes multiple injections for pain   Body Part(s)   Body Part(s) Lumbar Spine/SI   Presentation and Etiology   Pertinent history of current problem (include personal factors and/or comorbidities that impact the POC) Pt arrived to PT today for chronic LBP that started a couple years ago. Reports insidious onset, but may be attributed to pain from long car ride. States sx primarily on L side, and worsens with prolonged sitting and then standing. Typicaly sx worse after sitting for an hour. Has tried yoga and has gotten some relief. Notes he did have injections recently that helped wiht pain but still having persistent \"throbbing.\" Shared he does play hockey a couple times a week but doesn't seem to exacerbate his pain.   Impairments A. Pain;G. Impaired balance   Functional Limitations perform activities of daily living;perform required work activities;perform desired leisure / sports activities   Symptom Location LBP (L>R)   How/Where did it occur From insidious onset   Onset date of current episode/exacerbation 02/21/23  (date of order, chronic condition)   Chronicity Chronic   Pain rating (0-10 point scale) Best (/10);Worst (/10)   Best (/10) 0   Worst (/10) 10   Pain quality A. Sharp;E. Shooting   Frequency of pain/symptoms D. Other   Pain frequency comment \"when I " "stand up or put socks on\"   Pain/symptoms are: Worse during the day   Pain/symptoms exacerbated by A. Sitting   Progression of symptoms since onset: Unchanged   Prior Level of Function   Prior Level of Function-Mobility independent   Prior Level of Function-ADLs independent   Current Level of Function   Current Community Support Family/friend caregiver   Patient role/employment history A. Employed   Employment Comments works desk job   Living environment House/townhome   Current equipment-Gait/Locomotion None  (notes he has used walker twice in the last year due to pain)   Fall Risk Screen   Fall screen completed by PT   Have you fallen 2 or more times in the past year? No   Have you fallen and had an injury in the past year? No   Is patient a fall risk? No   Abuse Screen (yes response referral indicated)   Physical Signs of Abuse Present no   System Outcome Measures   Outcome Measures Low Back Pain (see Oswestry and Martha)   Lumbar Spine/SI Objective Findings   Gait/Locomotion unremarkable   Hamstring Flexibility R: 70*, L: 60*   Hip Flexor Flexibility slight limitation B   Quadricep Flexibility WNL B   Piriformis Flexibility Slight limitation B   Flexion ROM fingertips to toes   Extension ROM no limitation   Right Side Bending ROM inferior patella   Left Side Bending ROM inferior patella, notes increased sx in LBP   Repeated Extension-Standing ROM neg   Repeated Flexion-Standing ROM neg   Lumbar ROM Comment rotation: WNL B.  UE ROM: WNL but L shoulder ER increases L shoulder pain. thoracic mobility WNL, no pain. cervical ROM: WNL but L SB slight limitation, notes increased pain in L low back with flexion. UE MMT all motions 5/5 with no increase in sx   Pelvic Screen R anterior and L posteior   Hip Screen neg shonda and KI B   Hip Flexion (L2) Strength 5/5 B   Hip Abduction Strength seated: 5/5 B   Hip Adduction Strength seated: 5/5 B   Knee Flexion Strength 5/5 B   Knee Extension (L3) Strength 5/5 B   Ankle " Dorsiflexion (L4) Strength 5/5 B   Great Toe Extension (L5) Strength 5/5 B   Ankle Plantar Flexion (S1) Strength 5/5 B   Lumbar/Hip/Knee/Foot Strength Comments Hip IR: 4+/5 B, Hip ER: 4+/5.  thoracic accessory mobility is normal.   SLR - B   Prone Instability Test neg   Crossover SLR + on R (pain on L)   Repeated Extension Prone neg   Spring Test notes slight increase in pain in L4 and L5 but not significant   Segmental Mobility normal   Palpation slight pain over L4 and L5 spinous processes   Slump Test + B (L>R)   Integumentary no palpable edema   Posture unremarkable   Sensation Testing normal dull touch sensation B   Planned Therapy Interventions   Planned Therapy Interventions ADL retraining;joint mobilization;manual therapy;motor coordination training;neuromuscular re-education;strengthening;stretching;transfer training   Planned Modality Interventions   Planned Modality Interventions Cryotherapy;Electrical stimulation;Hot packs;Ultrasound;Traction;TENS   Planned Modality Interventions Comments only as needed   Clinical Impression   Criteria for Skilled Therapeutic Interventions Met yes, treatment indicated   PT Diagnosis chronic LBP   Influenced by the following impairments decreased ROM, decreased strength, radicular sx, pain   Functional limitations due to impairments transfers, ADLs   Clinical Presentation Stable/Uncomplicated   Clinical Presentation Rationale comorbidities, PLOF, FLETCHER, betsy, clinical judgement   Clinical Decision Making (Complexity) Low complexity   Therapy Frequency 1 time/week   Predicted Duration of Therapy Intervention (days/wks) 6 weeks   Risk & Benefits of therapy have been explained Yes   Patient, Family & other staff in agreement with plan of care Yes   Clinical Impression Comments Pt is a 33 y.o. male who presented to the PT clinic today with a rehab diagnosis of chronic LBP as evidenced by decreased ROM, decreased strength, radicular sx and pain. Pt is appropriate for skilled PT  to address previously listed impairments in order to decrease difficulty with transfers and ADLs.   Education Assessment   Preferred Learning Style Listening;Reading;Demonstration;Pictures/video   Barriers to Learning No barriers   ORTHO GOALS   PT Ortho Eval Goals 1;2;3   Ortho Goal 1   Goal Identifier 1   Goal Description Pt will report no increase in pain on personal care portion of FLETCHER, indicating decrease difficulty with ADLs   Target Date 04/11/23   Ortho Goal 2   Goal Identifier 2   Goal Description Pt will be able to sit 1 hour and then stand without increase in sx in order to decrease difficulty with work   Target Date 05/05/23   Ortho Goal 3   Goal Identifier 3   Goal Description Pt will be independent with HEP in order to self manage symptoms.   Target Date 05/05/23   Total Evaluation Time   PT Eval, Low Complexity Minutes (28202) 30       Please contact me with any questions or concerns.    Thank you for your referral,     Lucy Kaopor, PT, DPT  Physical Therapist  [facility]  [address] [place] [id number] [phone]

## 2023-03-28 ENCOUNTER — HOSPITAL ENCOUNTER (OUTPATIENT)
Dept: PHYSICAL THERAPY | Facility: CLINIC | Age: 33
Setting detail: THERAPIES SERIES
Discharge: HOME | End: 2023-03-28
Attending: STUDENT IN AN ORGANIZED HEALTH CARE EDUCATION/TRAINING PROGRAM
Payer: COMMERCIAL

## 2023-03-28 PROCEDURE — 97140 MANUAL THERAPY 1/> REGIONS: CPT | Mod: GP | Performed by: PHYSICAL MEDICINE & REHABILITATION

## 2023-03-28 PROCEDURE — 97110 THERAPEUTIC EXERCISES: CPT | Mod: GP | Performed by: PHYSICAL MEDICINE & REHABILITATION

## 2023-04-07 ENCOUNTER — HOSPITAL ENCOUNTER (OUTPATIENT)
Dept: PHYSICAL THERAPY | Facility: CLINIC | Age: 33
Setting detail: THERAPIES SERIES
Discharge: HOME | End: 2023-04-07
Attending: STUDENT IN AN ORGANIZED HEALTH CARE EDUCATION/TRAINING PROGRAM
Payer: COMMERCIAL

## 2023-04-07 PROCEDURE — 97110 THERAPEUTIC EXERCISES: CPT | Mod: GP | Performed by: PHYSICAL MEDICINE & REHABILITATION

## 2023-04-07 PROCEDURE — 97140 MANUAL THERAPY 1/> REGIONS: CPT | Mod: GP | Performed by: PHYSICAL MEDICINE & REHABILITATION

## 2023-04-23 ENCOUNTER — HEALTH MAINTENANCE LETTER (OUTPATIENT)
Age: 33
End: 2023-04-23

## 2023-04-24 ENCOUNTER — OFFICE VISIT (OUTPATIENT)
Dept: FAMILY MEDICINE | Facility: CLINIC | Age: 33
End: 2023-04-24
Payer: COMMERCIAL

## 2023-04-24 VITALS
HEART RATE: 86 BPM | TEMPERATURE: 97.1 F | SYSTOLIC BLOOD PRESSURE: 136 MMHG | RESPIRATION RATE: 16 BRPM | WEIGHT: 293.2 LBS | BODY MASS INDEX: 35.7 KG/M2 | OXYGEN SATURATION: 96 % | DIASTOLIC BLOOD PRESSURE: 86 MMHG | HEIGHT: 76 IN

## 2023-04-24 DIAGNOSIS — H10.33 ACUTE BACTERIAL CONJUNCTIVITIS OF BOTH EYES: Primary | ICD-10-CM

## 2023-04-24 PROCEDURE — 99213 OFFICE O/P EST LOW 20 MIN: CPT | Performed by: NURSE PRACTITIONER

## 2023-04-24 RX ORDER — POLYMYXIN B SULFATE AND TRIMETHOPRIM 1; 10000 MG/ML; [USP'U]/ML
1-2 SOLUTION OPHTHALMIC EVERY 4 HOURS
Qty: 10 ML | Refills: 0 | Status: SHIPPED | OUTPATIENT
Start: 2023-04-24 | End: 2023-05-01

## 2023-04-24 ASSESSMENT — ENCOUNTER SYMPTOMS: EYE PAIN: 1

## 2023-04-24 ASSESSMENT — PAIN SCALES - GENERAL: PAINLEVEL: NO PAIN (0)

## 2023-04-24 NOTE — PROGRESS NOTES
"  Assessment & Plan     Acute bacterial conjunctivitis of both eyes  Will start treatment today, with new symptoms of congestion advised symptomatic management nasal spray, allergy medication, and rest. If persistent or worsening and present >10 days would likely require treatment for sinusitis.   - trimethoprim-polymyxin b (POLYTRIM) 28040-4.1 UNIT/ML-% ophthalmic solution; Place 1-2 drops into both eyes every 4 hours for 7 days    Nicotine/Tobacco Cessation:  He reports that he has been smoking cigarettes. He has never used smokeless tobacco.  Nicotine/Tobacco Cessation Plan:   Information offered: Patient not interested at this time      BMI:   Estimated body mass index is 35.93 kg/m  as calculated from the following:    Height as of this encounter: 1.924 m (6' 3.75\").    Weight as of this encounter: 133 kg (293 lb 3.2 oz).         JM Fishman [facility]    Stephanie Mercado is a 33 year old, presenting for the following health issues:  Eye Problem and Health Maintenance (Advised patient of hm. Declines shots today.)        4/24/2023     2:42 PM   Additional Questions   Roomed by Claire BLUM CMA   Accompanied by self         Eye Problem     History of Present Illness       Reason for visit:  Pink eye  Symptom onset:  1-3 days ago  Symptom intensity:  Moderate  Symptom progression:  Staying the same  Had these symptoms before:  Yes  Has tried/received treatment for these symptoms:  Yes  Previous treatment was successful:  Yes  Prior treatment description:  Meds    He eats 2-3 servings of fruits and vegetables daily.He consumes 0 sweetened beverage(s) daily.He exercises with enough effort to increase his heart rate 60 or more minutes per day.  He exercises with enough effort to increase his heart rate 4 days per week.   He is taking medications regularly.     Patient states that he tried to donate blood last week and they told him his iron was too high. He would like to " "discuss this.   Patient states that his left eye has been having gunk for the last 2 days. It was matted shut this morning AND YESTERDAY. No itching or pain. , a little redness. Is having more blurred vision in that eye as well.  The symptoms are now in the right eye as well as the left eye.      Review of Systems   Eyes: Positive for pain.      Constitutional, HEENT, cardiovascular, pulmonary, gi and gu systems are negative, except as otherwise noted.      Objective    /86 (BP Location: Right arm, Patient Position: Sitting, Cuff Size: Adult Regular)   Pulse 86   Temp 97.1  F (36.2  C) (Tympanic)   Resp 16   Ht 1.924 m (6' 3.75\")   Wt 133 kg (293 lb 3.2 oz)   SpO2 96%   BMI 35.93 kg/m    Body mass index is 35.93 kg/m .  Physical Exam   GENERAL: healthy, alert and no distress  EYES: Eyes grossly normal to inspection and injected conjunctiva, mattering noted outside of eye. Left worse than right.   HENT: ear canals and TM's normal, nose and mouth without ulcers or lesions  NECK: no adenopathy, no asymmetry, masses, or scars and thyroid normal to palpation  RESP: lungs clear to auscultation - no rales, rhonchi or wheezes  CV: regular rate and rhythm, normal S1 S2, no S3 or S4, no murmur, click or rub, no peripheral edema and peripheral pulses strong  PSYCH: mentation appears normal, affect normal/bright                    "

## 2023-04-24 NOTE — PATIENT INSTRUCTIONS
Thank you for choosing us for your care. I have placed an order for a prescription so that you can start treatment. View your full visit summary for details by clicking on the link below. Your pharmacist will able to address any questions you may have about the medication.     If you re not feeling better within 2-3 days, please schedule an appointment.  You can schedule an appointment right here in [facility], or call [phone]  If the visit is for the same symptoms as your eVisit, we ll refund the cost of your eVisit if seen within seven days.      Bacterial Conjunctivitis    You have an infection in the membranes covering the white part of the eye. This part of the eye is called the conjunctiva. The infection is called conjunctivitis. The most common symptoms of conjunctivitis include a thick, pus-like discharge from the eye, swollen eyelids, redness, eyelids sticking together upon awakening, and a gritty or scratchy feeling in the eye. Your infection was caused by bacteria. It may be treated with medicine. With treatment, the infection takes about 7 to 10 days to resolve.   Home care    Use prescribed antibiotic eye drops or ointment as directed to treat the infection.    Apply a warm compress (towel soaked in warm water) to the affected eye 3 to 4 times a day. Do this just before applying medicine to the eye.    Use a warm, wet cloth to wipe away crusting of the eyelids in the morning. This is caused by mucus drainage during the night. You may also use saline irrigating solution or artificial tears to rinse away mucus in the eye. Do not put a patch over the eye.    Wash your hands before and after touching the infected eye. This is to prevent spreading the infection to the other eye, and to other people. Don't share your towels or washcloths with others.    You may use acetaminophen or ibuprofen to control pain, unless another medicine was prescribed. Talk with your healthcare provider before using these  medicines if you have chronic liver or kidney disease. Also talk with your provider if you have ever had a stomach ulcer or digestive bleeding.    Don't wear contact lenses until your eyes have healed and all symptoms are gone.    Follow-up care  Follow up with your healthcare provider, or as advised.  When to seek medical advice  Call your healthcare provider right away if any of these occur:    Worsening vision    Increasing pain in the eye    Increasing swelling or redness of the eyelid    Redness spreading around the eye  Employee Benefit Plans last reviewed this educational content on 4/1/2020 2000-2022 [employer]. All rights reserved. This information is not intended as a substitute for professional medical care. Always follow your healthcare professional's instructions.

## 2023-05-12 NOTE — PROGRESS NOTES
Outpatient Physical Therapy Discharge Note     Patient: Rogelio Noel  : 1990    Beginning/End Dates of Reporting Period:  3/21/23 to 23    Referring Provider: Armaan Sanchez MD    Therapy Diagnosis: chronic LBP    Patient did not return for follow up treatments.  Goal status and current objective information is therefore unknown.  Discharge from PT services at this time for this episode of treatment. Please see attached documentation under this episode of care for further information including dates of service, start of care date, referring physician, Dx, treatment plan, treatments, etc.    Please contact me with any questions or concerns.    Thank you for your referral.    Lucy Kapoor, PT, DPT  Physical Therapist  [address] [address] [phone]

## 2023-05-23 ENCOUNTER — OFFICE VISIT (OUTPATIENT)
Dept: PALLIATIVE MEDICINE | Facility: OTHER | Age: 33
End: 2023-05-23
Payer: COMMERCIAL

## 2023-05-23 VITALS
WEIGHT: 289 LBS | HEIGHT: 76 IN | HEART RATE: 73 BPM | DIASTOLIC BLOOD PRESSURE: 76 MMHG | OXYGEN SATURATION: 97 % | BODY MASS INDEX: 35.19 KG/M2 | SYSTOLIC BLOOD PRESSURE: 129 MMHG

## 2023-05-23 DIAGNOSIS — G57.02 PIRIFORMIS SYNDROME, LEFT: Primary | ICD-10-CM

## 2023-05-23 PROCEDURE — G0463 HOSPITAL OUTPT CLINIC VISIT: HCPCS | Performed by: STUDENT IN AN ORGANIZED HEALTH CARE EDUCATION/TRAINING PROGRAM

## 2023-05-23 PROCEDURE — 99214 OFFICE O/P EST MOD 30 MIN: CPT | Performed by: STUDENT IN AN ORGANIZED HEALTH CARE EDUCATION/TRAINING PROGRAM

## 2023-05-23 RX ORDER — METHOCARBAMOL 500 MG/1
500 TABLET, FILM COATED ORAL 4 TIMES DAILY PRN
Qty: 120 TABLET | Refills: 3 | Status: SHIPPED | OUTPATIENT
Start: 2023-05-23 | End: 2024-05-01

## 2023-05-23 ASSESSMENT — PAIN SCALES - GENERAL: PAINLEVEL: MODERATE PAIN (4)

## 2023-05-23 NOTE — PROGRESS NOTES
05/23/23 0816   PEG: A Thee-Item Scale Assessing Pain Intensity and Interference        0 = No pain / No interference    10 = Pain as bad as you can imagine / Completely interferes   What number best describes your pain on average in the past week? 8   What number best describes how, during the past week, pain has interfered with your enjoyment of life? 6   What number best describes how, during the past week, pain has interfered with your general activity? 6   PEG Total Score 6.67

## 2023-05-23 NOTE — PROGRESS NOTES
"[facility] Follow-up    Date of visit: 5/23/2023    Chief complaint:   Chief Complaint   Patient presents with     Pain       Interval history:  Since his last visit, Rogelio Noel reports:  Injection helped 100% for 1 month. Per wife he was still having some pain.  HEP has been helping but pt says therapist told him to stop so he hasn't been doing them  Has a higher chair now and feels the pain is improved  Had an ergonomic assessment and a customized chair is on the way    Pain scores:  Pain intensity currently is 3 on a scale of 0-10.     Current pain medications include:  Tylenol - helps but takes very infrequently    Past pain medications  Ibuprofen     Other treatments have included:  Rogelio Noel has been seen at a pain clinic in the past.  Dr. Clark  PT: doing yoga at home. Undergoing - HEP helped but completed now  Injections:                L L5 TFESI x3 most recently 9/27/22 all with Dr. Clark - 1st helped 1 year, 2nd helped less, 3rd was very difficult to tolerate and then got a few weeks of relief   3/8/23 - L piriformis injection with me - helpful for a month  Surgery: none  Alternative: none recently    Side Effects: no side effect    Medications:  Current Outpatient Medications   Medication Sig Dispense Refill     lisinopril (ZESTRIL) 20 MG tablet Take 1 tablet (20 mg) by mouth daily 90 tablet 3       Medical History: any changes in medical history since they were last seen? No    Physical Exam:  Height 1.918 m (6' 3.5\"), weight 131.1 kg (289 lb).  Constitutional: Well developed, NAD  Head: normocephalic. Atraumatic.   Eyes: no redness or jaundice noted   CV: warm, well perfused extremities   Skin: no suspicious lesions or rashes  Psychiatric: mentation appears normal and affect full    Assessment:   1. Piriformis syndrome  2. Lumbar radiculopathy    Rogelio Noel is a 33 year old male who is seen at the pain clinic for follow " "up of his left piriformis syndrome and lumbar radiculopathy. He had a L piriformis injection nearly 3 months ago which did provide nearly 100% relief for about a month, then began to wear off. He went to PT and said HEP was helpful, but states therapist told him to stop because it \"wasn't working.\" He notes raising his work chair has helped. He plays hockey and notes some cutting maneuvers cause sharp pain. I've ordered methocarbamol to take up to 4 times daily. We can repeat injection as needed. Patient will receive his custom ergonomic chair as well. I have encouraged him to resume HEP and perform this daily as well as a stretching program before and after hockey.    Plan:  1. Physical Therapy:  Continue HEP  2. Diagnostic Studies:  None  3. Medication Management:  Methocarbamol 500mg up to 4 times daily as needed  4. Further procedures recommended: Consider left piriformis muscle injection   5. Other: pending ergonomic chair per ergonomic assessment  6. Follow up: 6 months    Armaan Sanchez MD  Interventional Pain Medicine  [facility]        "

## 2023-05-23 NOTE — NURSING NOTE
2/21/2023    10:06 AM 5/23/2023     8:16 AM   PEG Score   PEG Total Score 6.67 6.67       Questions/Concerns:  Discuss hip pain      Refills: no    Domenica Delaney LPN  [facility]

## 2023-05-23 NOTE — PATIENT INSTRUCTIONS
Physical Therapy:  Continue HEP  Diagnostic Studies:  MRI Lumbar spine reviewed  Medication Management:  Methocarbamol 500mg up to 4 times daily as needed  Further procedures recommended: Consider left piriformis muscle injection   Follow up: 6 months    Armaan Sanchez MD  Interventional Pain Medicine  [facility]    Clinic Number:  [phone]  Call with any questions about your care and for scheduling assistance.   Calls are returned Monday through Friday between 8 AM and 4:30 PM. We usually get back to you within 2 business days depending on the issue/request.    If we are prescribing your medications:  For opioid medication refills, call the clinic or send a Cloudbot message 7 days in advance.  Please include:  Name of requested medication  Name of the pharmacy.  For non-opioid medications, call your pharmacy directly to request a refill. Please allow 3-4 days to be processed.   Per [place] State Law:  All controlled substance prescriptions must be filled within 30 days of being written.    For those controlled substances allowing refills, pickup must occur within 30 days of last fill.      We believe regular attendance is key to your success in our program!    Any time you are unable to keep your appointment we ask that you call us at least 24 hours in advance to cancel.This will allow us to offer the appointment time to another patient.   Multiple missed appointments may lead to dismissal from the clinic.

## 2023-05-25 ENCOUNTER — TELEPHONE (OUTPATIENT)
Dept: PALLIATIVE MEDICINE | Facility: OTHER | Age: 33
End: 2023-05-25
Payer: COMMERCIAL

## 2023-06-14 ENCOUNTER — ANCILLARY PROCEDURE (OUTPATIENT)
Dept: GENERAL RADIOLOGY | Facility: CLINIC | Age: 33
End: 2023-06-14
Attending: FAMILY MEDICINE
Payer: COMMERCIAL

## 2023-06-14 ENCOUNTER — OFFICE VISIT (OUTPATIENT)
Dept: FAMILY MEDICINE | Facility: CLINIC | Age: 33
End: 2023-06-14
Payer: COMMERCIAL

## 2023-06-14 ENCOUNTER — ANCILLARY ORDERS (OUTPATIENT)
Dept: FAMILY MEDICINE | Facility: CLINIC | Age: 33
End: 2023-06-14

## 2023-06-14 VITALS
RESPIRATION RATE: 16 BRPM | TEMPERATURE: 98.6 F | BODY MASS INDEX: 35.31 KG/M2 | HEART RATE: 70 BPM | WEIGHT: 290 LBS | DIASTOLIC BLOOD PRESSURE: 72 MMHG | SYSTOLIC BLOOD PRESSURE: 126 MMHG | OXYGEN SATURATION: 98 % | HEIGHT: 76 IN

## 2023-06-14 DIAGNOSIS — F41.9 ANXIETY: Primary | ICD-10-CM

## 2023-06-14 DIAGNOSIS — R52 PAIN: ICD-10-CM

## 2023-06-14 DIAGNOSIS — M25.512 CHRONIC LEFT SHOULDER PAIN: ICD-10-CM

## 2023-06-14 DIAGNOSIS — G89.29 CHRONIC LEFT SHOULDER PAIN: ICD-10-CM

## 2023-06-14 PROCEDURE — 73030 X-RAY EXAM OF SHOULDER: CPT | Mod: TC | Performed by: RADIOLOGY

## 2023-06-14 PROCEDURE — 99214 OFFICE O/P EST MOD 30 MIN: CPT | Performed by: FAMILY MEDICINE

## 2023-06-14 ASSESSMENT — PAIN SCALES - GENERAL: PAINLEVEL: NO PAIN (0)

## 2023-06-14 NOTE — PROGRESS NOTES
"s :Rogelio Noel is a 33 year old male with anxiety.  Worse over months.  \"always been anxious, expecially socially\"     Wonders about medication.  Some improvement with cutting back on caffeine.      L shoulder pain.  Chronic .  Getting close to a year.  No injury.  Hurts to lie on L side.  OK with ROM and strength.  Plays hockey, no limitations.  Naproxen didn't help      O:/72   Pulse 70   Temp 98.6  F (37  C) (Tympanic)   Resp 16   Ht 1.924 m (6' 3.75\")   Wt 131.5 kg (290 lb)   SpO2 98%   BMI 35.53 kg/m    GEN: Alert and oriented, in no acute distress  Good strength and ROM on shoulder, no AC pain on palpation    Xray: OK on shoulder    A: anxiety, worse      Shoulder pain, persistent    P:  prozac for anxiety.  He was motivated to try something.  Back in 2 months    Therapy for shoulder.  Sports med if not improving?  Re-assess on f/u.      30  min spent on date of encounter reviewing chart, history, physical, documenting and counseling as mentioned in this note     Answers for HPI/ROS submitted by the patient on 6/14/2023  What is the reason for your visit today? : appointment  How many servings of fruits and vegetables do you eat daily?: 2-3  On average, how many sweetened beverages do you drink each day (Examples: soda, juice, sweet tea, etc.  Do NOT count diet or artificially sweetened beverages)?: 0  How many minutes a day do you exercise enough to make your heart beat faster?: 20 to 29  How many days a week do you exercise enough to make your heart beat faster?: 5  How many days per week do you miss taking your medication?: 0      "

## 2023-09-21 ENCOUNTER — ANCILLARY PROCEDURE (OUTPATIENT)
Dept: GENERAL RADIOLOGY | Facility: CLINIC | Age: 33
End: 2023-09-21
Attending: FAMILY MEDICINE
Payer: COMMERCIAL

## 2023-09-21 ENCOUNTER — OFFICE VISIT (OUTPATIENT)
Dept: ORTHOPEDICS | Facility: CLINIC | Age: 33
End: 2023-09-21
Payer: COMMERCIAL

## 2023-09-21 VITALS
WEIGHT: 286 LBS | SYSTOLIC BLOOD PRESSURE: 131 MMHG | DIASTOLIC BLOOD PRESSURE: 89 MMHG | HEIGHT: 76 IN | BODY MASS INDEX: 34.83 KG/M2

## 2023-09-21 DIAGNOSIS — M25.571 CHRONIC PAIN OF RIGHT ANKLE: Primary | ICD-10-CM

## 2023-09-21 DIAGNOSIS — M79.673 CHRONIC FOOT PAIN, UNSPECIFIED LATERALITY: ICD-10-CM

## 2023-09-21 DIAGNOSIS — M25.571 CHRONIC PAIN OF RIGHT ANKLE: ICD-10-CM

## 2023-09-21 DIAGNOSIS — G89.29 CHRONIC PAIN OF RIGHT ANKLE: ICD-10-CM

## 2023-09-21 DIAGNOSIS — G89.29 CHRONIC FOOT PAIN, UNSPECIFIED LATERALITY: ICD-10-CM

## 2023-09-21 DIAGNOSIS — G89.29 CHRONIC PAIN OF RIGHT ANKLE: Primary | ICD-10-CM

## 2023-09-21 DIAGNOSIS — M19.079 ANKLE ARTHRITIS: ICD-10-CM

## 2023-09-21 PROCEDURE — 99204 OFFICE O/P NEW MOD 45 MIN: CPT | Performed by: FAMILY MEDICINE

## 2023-09-21 PROCEDURE — 73610 X-RAY EXAM OF ANKLE: CPT | Mod: TC | Performed by: RADIOLOGY

## 2023-09-21 NOTE — LETTER
"    2023         RE: Rogelio Noel  [id number] [facility] [address]        Dear Colleague,    Thank you for referring your patient, Rogelio Noel, to the [facility] [place]. Please see a copy of my visit note below.    Rogelio Noel  :  1990  DOS: 2023  MRN: [id number]    Sports Medicine Clinic Visit    PCP: Mitesh Bai    Rogelio Noel is a 33 year old male who is seen as a self referral presenting with acute on chronic right ankle pain.    Injury: Reports insidious onset without acute precipitating event that patient first noticed approximately ~ one week ago.  Pain located over right generalized anterior lateral ankle joint, posterior heel, nonradiating.  Additional Features:  Positive: swelling and numbness (skating).  Symptoms are better with Ice, Tylenol, and Rest.  Symptoms are worse with: skating, walking barefoot, ankle dorsiflexion.  Other evaluation and/or treatments so far consists of: Ice, Tylenol, Rest, and compression wrap.  Prior imaging: No recent imaging completed.  Prior History of related problems: prior history of intermittent right ankle pain that started in .  Previously saw Podiatry - Dr Melendez in 2020, recommendation for orthotics at this time that have usually provided him with good relief.    Social History: currently employed as     Review of Systems  Musculoskeletal: as above  Remainder of review of systems is negative including constitutional, CV, pulmonary, GI, Skin and Neurologic except as noted in HPI or medical history.    No past medical history on file.  No past surgical history on file.  Family History   Problem Relation Age of Onset     Cancer Mother      Diabetes Mother      Hypertension Mother      Obesity Mother      Diabetes Father      Hypertension Father      Obesity Father      Hypertension Brother        Objective  /89   Ht 1.924 m (6' 3.75\")   Wt 129.7 kg (286 lb)   " BMI 35.04 kg/m      General: healthy, alert and in no distress    HEENT: no scleral icterus or conjunctival erythema   Skin: no suspicious lesions or rash. No jaundice.   CV: regular rhythm by palpation, 2+ distal pulses, no pedal edema    Resp: normal respiratory effort without conversational dyspnea   Psych: normal mood and affect    Gait: nonantalgic, appropriate coordination and balance   Neuro: normal light touch sensory exam of the extremities. Motor strength as noted below     Right Ankle/Foot Exam:    Inspection:       no visible ecchymosis       edema noted mildly about the ankle joint anteriorly, medially and laterally    Foot inspection:       no deformity noted    ROM:        limited dorsiflexion, otherwise full ROM    Tender:       Dorsal, medial and posterior tibiotalar joint       medial malleolus       tibialis posterior tendon, posterior to medial malleolus    Non-Tender:       remainder of foot and ankle    Skin:       well perfused       capillary refill less than 2 seconds    Special Tests:       Mildly painful anterior drawer        neg (-) talar tilt        neg (-) external rotation testing     Gait:       antalgic gait    Proprioception:        deferred      Radiology:  Recent Results (from the past 744 hour(s))   XR Ankle RT G/E 3 vw    Narrative    ANKLE RIGHT THREE OR MORE VIEWS  9/21/2023 3:13 PM    HISTORY: Chronic pain of right ankle.    COMPARISON: None.      Impression    IMPRESSION: Moderate size chronic avulsion fracture along the tip of  the medial malleolus. The ankle mortise is intact. Loose bodies within  the tibiotalar joint. No soft tissue swelling.    ANGEL AL MD         SYSTEM ID:  [id number]       Assessment:  1. Chronic pain of right ankle    2. Ankle arthritis    3. Chronic foot pain, unspecified laterality        Plan:  Discussed the assessment with the patient.  Follow up: prn with me  XR images independently visualized and reviewed with patient today in  clinic  No change in chronic medial ankle ossicle, suspect post-traumatic vs development variant  No significant injury in the past that he can recall  Loose bodies present in the joint as well as some small marginal osteophytes, suspect some measure of underlying osteoarthritis based on hx, exam and imaging, reviewed today, also similar in appearance to prior XR  Ankle brace options and strategies reviewed, provided today  Low impact activity strategies reviewed  Oral Tylenol and topical Voltaren gel reviewed as safe OTC options, reviewed safe dosing strategies  Could offer advanced imaging to better clarify degree of damage/wear-and-tear within ankle joint  PT options reviewed, declined for now  Home handouts provided and supportive care reviewed  All questions were answered today  Contact us with additional questions or concerns  Signs and sx of concern reviewed      Garrett Perez DO, CAQ  Sports Medicine Physician  [facility]              Again, thank you for allowing me to participate in the care of your patient.        Sincerely,        Garrett Perez DO

## 2023-11-09 ENCOUNTER — OFFICE VISIT (OUTPATIENT)
Dept: PALLIATIVE MEDICINE | Facility: OTHER | Age: 33
End: 2023-11-09
Attending: STUDENT IN AN ORGANIZED HEALTH CARE EDUCATION/TRAINING PROGRAM
Payer: COMMERCIAL

## 2023-11-09 VITALS
OXYGEN SATURATION: 98 % | BODY MASS INDEX: 36.64 KG/M2 | SYSTOLIC BLOOD PRESSURE: 129 MMHG | DIASTOLIC BLOOD PRESSURE: 73 MMHG | HEART RATE: 87 BPM | WEIGHT: 299 LBS

## 2023-11-09 DIAGNOSIS — G57.02 PIRIFORMIS SYNDROME, LEFT: Primary | ICD-10-CM

## 2023-11-09 DIAGNOSIS — M54.16 LUMBAR RADICULOPATHY: ICD-10-CM

## 2023-11-09 PROCEDURE — 99213 OFFICE O/P EST LOW 20 MIN: CPT | Performed by: STUDENT IN AN ORGANIZED HEALTH CARE EDUCATION/TRAINING PROGRAM

## 2023-11-09 PROCEDURE — 99214 OFFICE O/P EST MOD 30 MIN: CPT | Performed by: STUDENT IN AN ORGANIZED HEALTH CARE EDUCATION/TRAINING PROGRAM

## 2023-11-09 ASSESSMENT — PAIN SCALES - GENERAL: PAINLEVEL: NO PAIN (1)

## 2023-11-09 NOTE — PATIENT INSTRUCTIONS
Physical Therapy:  Continue HEP  Diagnostic Studies:  None  Medication Management:    Continue Methocarbamol 500mg up to 4 times daily as needed  Further procedures recommended:   Consider repeat left piriformis muscle injection - patient ok to call us if needed  Other: pending ergonomic chair per ergonomic assessment  Follow up: 6 months    Armaan Sanchez MD  Interventional Pain Medicine  [facility] Number:  [phone]  Call with any questions about your care and for scheduling assistance.   Calls are returned Monday through Friday between 8 AM and 4:30 PM. We usually get back to you within 2 business days depending on the issue/request.    If we are prescribing your medications:  For opioid medication refills, call the clinic or send a Folloyu message 7 days in advance.  Please include:  Name of requested medication  Name of the pharmacy.  For non-opioid medications, call your pharmacy directly to request a refill. Please allow 3-4 days to be processed.   Per [place] State Law:  All controlled substance prescriptions must be filled within 30 days of being written.    For those controlled substances allowing refills, pickup must occur within 30 days of last fill.      We believe regular attendance is key to your success in our program!    Any time you are unable to keep your appointment we ask that you call us at least 24 hours in advance to cancel.This will allow us to offer the appointment time to another patient.   Multiple missed appointments may lead to dismissal from the clinic.

## 2023-11-09 NOTE — PROGRESS NOTES
[facility] Follow-up    Date of visit: 11/9/2023    Chief complaint:   Chief Complaint   Patient presents with    Pain    Pain Management       Interval history:  Since his last visit, Rogelio Noel reports:  Left piriformis muscle is feeling okay, still hurts.  Occasionally feels an instantaneous shock type pain down the spine from the neck to the sacrum - denies any family history of MS    Pain scores:  Pain intensity currently is 1 on a scale of 0-10.     Current pain medications include:  Tylenol - helps but takes very infrequently  Methocarbamol - helpful     Past pain medications  Ibuprofen     Other treatments have included:  Rogelioandres Noel has been seen at a pain clinic in the past.  Dr. Clark  PT: doing yoga at home. Undergoing - HEP helped but completed now  Injections:                L L5 TFESI x3 most recently 9/27/22 all with Dr. Calrk - 1st helped 1 year, 2nd helped less, 3rd was very difficult to tolerate and then got a few weeks of relief              3/8/23 - L piriformis injection with me - helpful for a month  Surgery: none  Alternative: none recently    Side Effects: no side effect    Medications:  Current Outpatient Medications   Medication Sig Dispense Refill    lisinopril (ZESTRIL) 20 MG tablet Take 1 tablet (20 mg) by mouth daily 90 tablet 3    methocarbamol (ROBAXIN) 500 MG tablet Take 1 tablet (500 mg) by mouth 4 times daily as needed for muscle spasms 120 tablet 3    FLUoxetine (PROZAC) 20 MG capsule Take 1 capsule (20 mg) by mouth daily (Patient not taking: Reported on 11/9/2023) 30 capsule 2       Medical History: any changes in medical history since they were last seen? No    Physical Exam:  Blood pressure 129/73, pulse 87, weight 135.6 kg (299 lb), SpO2 98%.  Constitutional: Well developed, NAD  Head: normocephalic. Atraumatic.   Eyes: no redness or jaundice noted   CV: warm, well perfused extremities   Skin: no  suspicious lesions or rashes   Psychiatric: mentation appears normal and affect full    Assessment:   1. Piriformis syndrome  2. Lumbar radiculopathy     Rogelio Noel is a 33 year old male who is seen at the pain clinic for follow up of his left piriformis syndrome and lumbar radiculopathy. He had a L piriformis injection which did provide nearly 100% relief for about a month, then began to wear off. He went to PT and said HEP was helpful. He notes raising his work chair has helped. He plays hockey and notes some cutting maneuvers cause sharp pain. He states methocarbamol helps but can make him a little drowsy.  We discussed the patient can continue taking the methocarbamol, we can repeat a left piriformis muscle injection as needed, but given the mild nature of the pain currently, we will proceed conservatively.  We will see him back in 6 months.     Plan:  Physical Therapy:  Continue HEP  Diagnostic Studies:  None  Medication Management:    Continue Methocarbamol 500mg up to 4 times daily as needed  Further procedures recommended:   Consider repeat left piriformis muscle injection - patient ok to call us if needed  Other: pending ergonomic chair per ergonomic assessment  Follow up: 6 months    Armaan Sanchez MD  Interventional Pain Medicine  [facility]

## 2023-11-09 NOTE — PROGRESS NOTES
Patient presents to the clinic today for a visit  with Armaan Sanchez MD            2/21/2023    10:06 AM 5/23/2023     8:16 AM 11/9/2023     4:09 PM   PEG Score   PEG Total Score 6.67 6.67 2       UDS/CSA-    Medications-    QUESTIONS:    Joanie RODRIGUEZ [facility] [place]

## 2023-11-27 ENCOUNTER — OFFICE VISIT (OUTPATIENT)
Dept: FAMILY MEDICINE | Facility: CLINIC | Age: 33
End: 2023-11-27
Payer: COMMERCIAL

## 2023-11-27 VITALS
OXYGEN SATURATION: 98 % | HEIGHT: 76 IN | DIASTOLIC BLOOD PRESSURE: 90 MMHG | SYSTOLIC BLOOD PRESSURE: 134 MMHG | RESPIRATION RATE: 18 BRPM | WEIGHT: 296 LBS | HEART RATE: 85 BPM | TEMPERATURE: 97.5 F | BODY MASS INDEX: 36.04 KG/M2

## 2023-11-27 DIAGNOSIS — R07.9 CHEST PAIN, UNSPECIFIED TYPE: Primary | ICD-10-CM

## 2023-11-27 DIAGNOSIS — I10 BENIGN ESSENTIAL HYPERTENSION: ICD-10-CM

## 2023-11-27 DIAGNOSIS — R06.02 SOB (SHORTNESS OF BREATH): ICD-10-CM

## 2023-11-27 DIAGNOSIS — E66.01 MORBID OBESITY (H): ICD-10-CM

## 2023-11-27 DIAGNOSIS — Z82.49 FAMILY HISTORY OF ISCHEMIC HEART DISEASE (IHD): ICD-10-CM

## 2023-11-27 PROCEDURE — 93000 ELECTROCARDIOGRAM COMPLETE: CPT | Performed by: FAMILY MEDICINE

## 2023-11-27 PROCEDURE — 99214 OFFICE O/P EST MOD 30 MIN: CPT | Performed by: FAMILY MEDICINE

## 2023-11-27 ASSESSMENT — PAIN SCALES - GENERAL: PAINLEVEL: NO PAIN (0)

## 2023-11-27 NOTE — PROGRESS NOTES
"  Assessment & Plan     (R07.9) Chest pain, unspecified type  (primary encounter diagnosis)  (R06.02) SOB (shortness of breath)  (I10) Benign essential hypertension  (E66.01) Morbid obesity (H)  (Z82.49) Family history of ischemic heart disease (IHD)  33-year-old male presented with left-sided chest pain, lightheadedness and shortness of breath which he has been experiencing for last 4 days or so, symptoms intermittent.  Past medical history significant for hypertension, morbid obesity and family history of ischemic heart disease.  Differential discussed in detail including ischemic heart disease, pulmonary embolism, musculoskeletal and psychological etiology.  EKG showed normal sinus rhythm.  Needs comprehensive evaluation to delineate specific diagnosis.  Recommended to go ER for further evaluation and management.  Patient understood and in agreement with above plan.  All questions answered.      Jagjit Ellis MD  [facility]    Stephanie Mercado is a 33 year old, presenting for the following health issues:  Hypertension      History of Present Illness       Hypertension: He presents for follow up of hypertension.  He does check blood pressure  regularly outside of the clinic. Outside blood pressures have been over 140/90. He follows a low salt diet.     Headaches:   Since the patient's last clinic visit, headaches are: worsened  The patient is getting headaches:  Recently  He is not able to do normal daily activities when he has a migraine.  The patient is taking the following rescue/relief medications:  No rescue/relief medications   Patient states \"The relief is inconsistent\" from the rescue/relief medications.   The patient is taking the following medications to prevent migraines:  No medications to prevent migraines  In the past 4 weeks, the patient has gone to an Urgent Care or Emergency Room 0 times times due to headaches.    He eats 4 or more servings of fruits and vegetables " "daily.He consumes 0 sweetened beverage(s) daily.He exercises with enough effort to increase his heart rate 30 to 60 minutes per day.  He exercises with enough effort to increase his heart rate 5 days per week.   He is taking medications regularly.       Review of Systems   Constitutional, HEENT, cardiovascular, pulmonary, GI, , musculoskeletal, neuro, skin, endocrine and psych systems are negative, except as otherwise noted.      Objective    BP (!) 134/90 (Cuff Size: Adult Large)   Pulse 85   Temp 97.5  F (36.4  C) (Tympanic)   Resp 18   Ht 1.924 m (6' 3.75\")   Wt 134.3 kg (296 lb)   SpO2 98%   BMI 36.27 kg/m    Body mass index is 36.27 kg/m .  Physical Exam   GENERAL: alert and no distress  EYES: Eyes grossly normal to inspection, PERRL and conjunctivae and sclerae normal  HENT: normal cephalic/atraumatic, nose and mouth without ulcers or lesions, oropharynx clear, and oral mucous membranes moist  NECK: no adenopathy, no asymmetry, masses, or scars and thyroid normal to palpation  RESP: lungs clear to auscultation - no rales, rhonchi or wheezes  CV: regular rates and rhythm, normal S1 S2, no S3 or S4, and no murmur, click or rub  ABDOMEN: soft, nontender  MS: no gross musculoskeletal defects noted, no edema  NEURO: Normal strength and tone, mentation intact and speech normal    Wt Readings from Last 10 Encounters:   11/27/23 134.3 kg (296 lb)   11/09/23 135.6 kg (299 lb)   09/21/23 129.7 kg (286 lb)   06/14/23 131.5 kg (290 lb)   05/23/23 131.1 kg (289 lb)   04/24/23 133 kg (293 lb 3.2 oz)   12/12/22 135.2 kg (298 lb)   03/10/22 131 kg (288 lb 12.8 oz)   11/24/21 132.9 kg (293 lb)   11/13/21 129.3 kg (285 lb)                  "

## 2023-11-27 NOTE — NURSING NOTE
"Chief Complaint   Patient presents with    Hypertension     BP (!) 134/90 (Cuff Size: Adult Large)   Pulse 85   Temp 97.5  F (36.4  C) (Tympanic)   Resp 18   Ht 1.924 m (6' 3.75\")   Wt 134.3 kg (296 lb)   SpO2 98%   BMI 36.27 kg/m   Estimated body mass index is 36.27 kg/m  as calculated from the following:    Height as of this encounter: 1.924 m (6' 3.75\").    Weight as of this encounter: 134.3 kg (296 lb).  Patient presents to the clinic using No DME      Health Maintenance that is potentially due pending provider review:    Health Maintenance Due   Topic Date Due    ADVANCE CARE PLANNING  Never done    HIV SCREENING  Never done    HEPATITIS C SCREENING  Never done    DTAP/TDAP/TD IMMUNIZATION (7 - Td or Tdap) 06/20/2021    YEARLY PREVENTIVE VISIT  11/24/2022    INFLUENZA VACCINE (1) 09/01/2023    COVID-19 Vaccine (2 - 2023-24 season) 09/01/2023                  "

## 2023-12-19 ENCOUNTER — MYC REFILL (OUTPATIENT)
Dept: FAMILY MEDICINE | Facility: CLINIC | Age: 33
End: 2023-12-19
Payer: COMMERCIAL

## 2023-12-19 DIAGNOSIS — I10 BENIGN ESSENTIAL HYPERTENSION: ICD-10-CM

## 2023-12-20 RX ORDER — LISINOPRIL 20 MG/1
20 TABLET ORAL DAILY
Qty: 90 TABLET | Refills: 3 | Status: SHIPPED | OUTPATIENT
Start: 2023-12-20 | End: 2024-05-01

## 2024-01-03 ENCOUNTER — OFFICE VISIT (OUTPATIENT)
Dept: FAMILY MEDICINE | Facility: CLINIC | Age: 34
End: 2024-01-03
Payer: COMMERCIAL

## 2024-01-03 VITALS
TEMPERATURE: 98.6 F | OXYGEN SATURATION: 98 % | WEIGHT: 305 LBS | BODY MASS INDEX: 37.14 KG/M2 | HEIGHT: 76 IN | SYSTOLIC BLOOD PRESSURE: 138 MMHG | DIASTOLIC BLOOD PRESSURE: 78 MMHG | HEART RATE: 74 BPM | RESPIRATION RATE: 16 BRPM

## 2024-01-03 DIAGNOSIS — Z11.59 NEED FOR HEPATITIS C SCREENING TEST: ICD-10-CM

## 2024-01-03 DIAGNOSIS — G89.29 CHRONIC LEFT SHOULDER PAIN: ICD-10-CM

## 2024-01-03 DIAGNOSIS — M25.512 CHRONIC LEFT SHOULDER PAIN: ICD-10-CM

## 2024-01-03 DIAGNOSIS — F41.9 ANXIETY: ICD-10-CM

## 2024-01-03 DIAGNOSIS — Z23 NEED FOR PROPHYLACTIC VACCINATION AND INOCULATION AGAINST INFLUENZA: ICD-10-CM

## 2024-01-03 DIAGNOSIS — Z11.4 SCREENING FOR HIV (HUMAN IMMUNODEFICIENCY VIRUS): Primary | ICD-10-CM

## 2024-01-03 PROCEDURE — 90686 IIV4 VACC NO PRSV 0.5 ML IM: CPT | Performed by: FAMILY MEDICINE

## 2024-01-03 PROCEDURE — 99214 OFFICE O/P EST MOD 30 MIN: CPT | Mod: 25 | Performed by: FAMILY MEDICINE

## 2024-01-03 PROCEDURE — 90471 IMMUNIZATION ADMIN: CPT | Performed by: FAMILY MEDICINE

## 2024-01-03 RX ORDER — LORAZEPAM 1 MG/1
1 TABLET ORAL DAILY PRN
Qty: 20 TABLET | Refills: 0 | Status: SHIPPED | OUTPATIENT
Start: 2024-01-03 | End: 2024-05-01

## 2024-01-03 RX ORDER — VENLAFAXINE HYDROCHLORIDE 75 MG/1
75 CAPSULE, EXTENDED RELEASE ORAL DAILY
Qty: 30 CAPSULE | Refills: 3 | Status: SHIPPED | OUTPATIENT
Start: 2024-01-03 | End: 2024-04-29

## 2024-01-03 NOTE — PROGRESS NOTES
" S: Rogelioandres Noel is a 33 year old male with anxiety spells.  In addition to chronic anxiety.  Didn't have much benefit from prozac for a month.  Willing to try something else    Will have more severe attacks with dizziness, chest tighness.  Related mostly to stressful times.     Went to ED, trops neg, no findings to support cardiopulmonary issues.  Has actually felt better since going in.  This was over  a month ago    O:/78   Pulse 74   Temp 98.6  F (37  C) (Tympanic)   Resp 16   Ht 1.924 m (6' 3.75\")   Wt 138.3 kg (305 lb)   SpO2 98%   BMI 37.37 kg/m    GEN: Alert and oriented, in no acute distress  CV: RRR, no murmur  EXT: no edema or lesions noted in lower extremities    A: anxiety, not controlled    P: effexor 75.  May titrate to 150 if not getting benefit.  20 ativan for prn use, rarely.      Follow-up in a month or so.  Virtual OK as option    30  min spent on date of encounter reviewing chart, history, physical, documenting and counseling as mentioned in this note     "

## 2024-04-16 ENCOUNTER — MYC MEDICAL ADVICE (OUTPATIENT)
Dept: PALLIATIVE MEDICINE | Facility: OTHER | Age: 34
End: 2024-04-16
Payer: COMMERCIAL

## 2024-04-16 DIAGNOSIS — G57.02 PIRIFORMIS SYNDROME, LEFT: Primary | ICD-10-CM

## 2024-04-16 NOTE — TELEPHONE ENCOUNTER
Chart review:  Per last apt: 11/9/23  Further procedures recommended:   Consider repeat left piriformis muscle injection - patient ok to call us if needed

## 2024-04-18 ENCOUNTER — TELEPHONE (OUTPATIENT)
Dept: PALLIATIVE MEDICINE | Facility: OTHER | Age: 34
End: 2024-04-18
Payer: COMMERCIAL

## 2024-04-18 NOTE — TELEPHONE ENCOUNTER
"Screening Questions for Radiology Injections:    Injection to be done at which interventional clinic site? [facility]    If choosing [facility] for location, please inform patient:  \"[facility] is a Hospital based clinic. Before your visit, you should check with your insurance about how it covers the charges for facility services in a hospital-based clinic.     Procedure ordered by Dr. Sanchez     Procedure ordered? Left piriformis muscle injection   ? Transforaminal Cervical ÉCSAR - Send to [facility] - No [facility] providers perform this procedure    What insurance would patient like us to bill for this procedure? [employer]     IF SCHEDULING IN [place] PAIN OR SPINE PLEASE SCHEDULE AT LEAST 7-10 BUSINESS DAYS OUT SO A PA CAN BE OBTAINED    Worker's comp or MVA (motor vehicle accident) -Any injection DO NOT SCHEDULE and route to Brian Sneed.      [employer] insurance - For SI joint injections, DO NOT SCHEDULE and route to Radha Richardson.       ALL BCBS, [employer] and [employer] - DO NOT SCHEDULE and route to Radha Richardson    MEDICA- ALL INJECTIONS- route to Radha Richardson    Is patient scheduled at [facility]? NO    If YES, route every encounter to [facility] SPINE CENTER CARE NAVIGATION POOL [[phone]]    Is an  needed? No     Patient has a  home? (Review Grid) YES: Informed     Any chance of pregnancy? Not Applicable   If YES, do NOT schedule and route to RN pool  - Dr. Lerner route to Susy Hoffman and PM&R Nurse  [[id number]]      Is patient actively being treated for cancer or immunocompromised? No  If YES, do NOT schedule and route to RN pool/ Dr. Lerner's Team    Does the patient have a bleeding or clotting disorder? No     If YES, okay to schedule AND route to RN nurse / Dr. Lerner's Team     (For any patients with platelet count <100, RN must forward to provider)    Is patient taking any Blood Thinners OR Antiplatelet medication?  No   If hold needed, do NOT schedule, " route to ZAC miller/ Dr. Lerner's Team  ? Examples:   o Blood Thinners: (Coumadin, Warfarin, Jantoven, Pradaxa, Xarelto, Eliquis, Edoxaban, Enoxaparin, Lovenox, Heparin, Arixtra, Fondaparinux or Fragmin)  o Antiplatelet Medications: (Plavix, Brilinta or Effient)     Is patient taking any aspirin products (includes Excedrin and Fiorinal)? No     If yes route to RN pool/ Dr. Lerner's Team - Do not schedule      Any allergies to contrast dye, iodine, shellfish, or numbing and steroid medications? No  ? If YES, schedule and add allergy information to appointment notes AND route to the RN pool/ Dr. Lerner's Team  ? If CÉSAR and Contrast Dye / Iodine Allergy? DO NOT SCHEDULE, route to RN pool/ Dr. Lerner's Team  ? Allergies: Patient has no known allergies.     Does patient have an active infection or treated for one within the past week? No  ? Is patient currently taking any antibiotics or steroid medications?  No     For patients on chronic, preventative, or prophylactic antibiotics, procedures may be scheduled.     For patients on antibiotics for active or recent infection, schedule 4 days after completed.    For patients on steroid medications, schedule 4 days after completed.     Has the patient had a flu shot or any other vaccinations within the past 7 days? No  If yes, explain that for the vaccine to work best they need to:     ? wait 1 week before and 1 week after getting any Vaccine  ? wait 1 week before and 2 weeks after getting any Covid Vaccine   ? If patient has concerns about the timing, send to RN pool/ Dr. Lerner's Team    Does patient have an MRI/CT?  Not Applicable Include Date and Check Procedure Scheduling Grid to see if required.    Was the MRI/CT done within the last 3 years?  NA     If no route to RN Pool/ Dr. Serranos Team    If yes, where was the MRI/CT done?      Refer to PACS Transmissions list for approved external locations and route to RN Pool High Priority/ Dr. Lerner's Team    If MRI was not done  at approved external location do NOT schedule and route to RN pool/ Dr. Lerner's Team      If patient has an imaging disc, the injection MAY be scheduled but patient must bring disc to appt or appt will be cancelled.    Is patient able to transfer to a procedure table with minimal or no assistance? Yes   ? If no, do NOT schedule and route to RN Pool/ Dr. Lerner's Team    Procedure Specific Instructions:  ? If celiac plexus block, informed patient NPO for 6 hours and that it is okay to take medications with sips of water, especially blood pressure medications Not Applicable       ? If this is for a cervical procedure, informed patient that aspirin needs to be held for 6 days.   Not Applicable    ? Sedation, If Sedation is ordered for any procedure, patient must be NPO for 6 hours prior to procedure Not Applicable    ? If IV needed:    Do not schedule procedures requiring IV placement in the first appointment of the day or first appointment after lunch. Do NOT schedule at 0745, 0815 or 1245.       Instructed patient to arrive 30 minutes early for IV start if required. (Check Procedure Scheduling Grid)  Not Applicable    Reminders:  ? If you are started on any steroids or antibiotics between now and your appointment, you must contact us because the procedure may need to be cancelled.  Yes    ? As a reminder, receiving steroids can decrease your body's ability to fight infection.   Would you still like to move forward with scheduling the injection?  Yes    ? IV Sedation is not provided for procedures. If oral anti-anxiety medication is needed, the patient should request this from their referring provider.    ? Instruct patient to arrive as directed prior to the scheduled appointment time:  If IV needed 30 minutes before appointment time       For patients 85 or older we recommend having an adult stay w/ them for the remainder of the day.       If the patient is Diabetic, remind them to bring their glucometer.      Does  the patient have any questions?  NO  Alana Maharaj  [facility]

## 2024-04-29 DIAGNOSIS — F41.9 ANXIETY: ICD-10-CM

## 2024-04-29 RX ORDER — VENLAFAXINE HYDROCHLORIDE 75 MG/1
75 CAPSULE, EXTENDED RELEASE ORAL DAILY
Qty: 30 CAPSULE | Refills: 0 | Status: SHIPPED | OUTPATIENT
Start: 2024-04-29 | End: 2024-05-01

## 2024-04-29 NOTE — TELEPHONE ENCOUNTER
Has appointment scheduled for 5/1/24.      Requested Prescriptions   Pending Prescriptions Disp Refills    venlafaxine (EFFEXOR XR) 75 MG 24 hr capsule [Pharmacy Med Name: VENLAFAXINE HCL ER 75MG CP24] 30 capsule 3     Sig: TAKE ONE CAPSULE BY MOUTH ONCE DAILY       Serotonin-Norepinephrine Reuptake Inhibitors  Failed - 4/29/2024  7:48 AM        Failed - ZOFIA-7 score of less than 5 in past 6 months.     Please review last ZOFIA-7 score.           Failed - Normal serum creatinine on file in past 12 months     Recent Labs   Lab Test 12/12/22  1725   CR 1.12                 Passed - Blood pressure under 140/90 in past 12 months     BP Readings from Last 3 Encounters:   01/03/24 138/78   11/27/23 (!) 134/90   11/09/23 129/73       No data recorded            Passed - Recent (12 mo) or future (30 days) visit within the authorizing provider's specialty     The patient must have completed an in-person or virtual visit within the past 12 months or has a future visit scheduled within the next 90 days with the authorizing provider s specialty.  Urgent care and e-visits do not quality as an office visit for this protocol.          Passed - Medication is active on med list        Passed - Patient is age 18 or older

## 2024-05-01 ENCOUNTER — OFFICE VISIT (OUTPATIENT)
Dept: FAMILY MEDICINE | Facility: CLINIC | Age: 34
End: 2024-05-01
Payer: COMMERCIAL

## 2024-05-01 VITALS
OXYGEN SATURATION: 98 % | WEIGHT: 297 LBS | BODY MASS INDEX: 36.17 KG/M2 | DIASTOLIC BLOOD PRESSURE: 78 MMHG | RESPIRATION RATE: 16 BRPM | HEIGHT: 76 IN | TEMPERATURE: 98.6 F | HEART RATE: 106 BPM | SYSTOLIC BLOOD PRESSURE: 128 MMHG

## 2024-05-01 DIAGNOSIS — K92.1 BLOOD IN STOOL: Primary | ICD-10-CM

## 2024-05-01 DIAGNOSIS — R73.09 ELEVATED GLUCOSE: ICD-10-CM

## 2024-05-01 DIAGNOSIS — F41.9 ANXIETY: ICD-10-CM

## 2024-05-01 DIAGNOSIS — F51.01 PRIMARY INSOMNIA: ICD-10-CM

## 2024-05-01 DIAGNOSIS — E78.5 HYPERLIPIDEMIA LDL GOAL <100: ICD-10-CM

## 2024-05-01 PROBLEM — I10 BENIGN ESSENTIAL HYPERTENSION: Status: RESOLVED | Noted: 2020-06-01 | Resolved: 2024-05-01

## 2024-05-01 LAB
ANION GAP SERPL CALCULATED.3IONS-SCNC: 11 MMOL/L (ref 7–15)
BUN SERPL-MCNC: 26.2 MG/DL (ref 6–20)
CALCIUM SERPL-MCNC: 9.3 MG/DL (ref 8.6–10)
CHLORIDE SERPL-SCNC: 102 MMOL/L (ref 98–107)
CHOLEST SERPL-MCNC: 254 MG/DL
CREAT SERPL-MCNC: 1.09 MG/DL (ref 0.67–1.17)
DEPRECATED HCO3 PLAS-SCNC: 25 MMOL/L (ref 22–29)
EGFRCR SERPLBLD CKD-EPI 2021: >90 ML/MIN/1.73M2
FASTING STATUS PATIENT QL REPORTED: NO
GLUCOSE SERPL-MCNC: 111 MG/DL (ref 70–99)
HBA1C MFR BLD: 5.1 % (ref 0–5.6)
HDLC SERPL-MCNC: 44 MG/DL
LDLC SERPL CALC-MCNC: 160 MG/DL
NONHDLC SERPL-MCNC: 210 MG/DL
POTASSIUM SERPL-SCNC: 4.3 MMOL/L (ref 3.4–5.3)
SODIUM SERPL-SCNC: 138 MMOL/L (ref 135–145)
TRIGL SERPL-MCNC: 252 MG/DL

## 2024-05-01 PROCEDURE — G2211 COMPLEX E/M VISIT ADD ON: HCPCS | Performed by: FAMILY MEDICINE

## 2024-05-01 PROCEDURE — 80048 BASIC METABOLIC PNL TOTAL CA: CPT | Performed by: FAMILY MEDICINE

## 2024-05-01 PROCEDURE — 83036 HEMOGLOBIN GLYCOSYLATED A1C: CPT | Performed by: FAMILY MEDICINE

## 2024-05-01 PROCEDURE — 36415 COLL VENOUS BLD VENIPUNCTURE: CPT | Performed by: FAMILY MEDICINE

## 2024-05-01 PROCEDURE — 80061 LIPID PANEL: CPT | Performed by: FAMILY MEDICINE

## 2024-05-01 PROCEDURE — 99214 OFFICE O/P EST MOD 30 MIN: CPT | Performed by: FAMILY MEDICINE

## 2024-05-01 RX ORDER — VENLAFAXINE HYDROCHLORIDE 75 MG/1
75 CAPSULE, EXTENDED RELEASE ORAL DAILY
Qty: 90 CAPSULE | Refills: 3 | Status: SHIPPED | OUTPATIENT
Start: 2024-05-01 | End: 2024-05-06

## 2024-05-01 RX ORDER — LORAZEPAM 1 MG/1
1 TABLET ORAL DAILY PRN
Qty: 20 TABLET | Refills: 0 | Status: SHIPPED | OUTPATIENT
Start: 2024-05-01

## 2024-05-01 ASSESSMENT — PAIN SCALES - GENERAL: PAINLEVEL: NO PAIN (0)

## 2024-05-01 NOTE — PROGRESS NOTES
"S :Rogelio LYLES Sadie is a 34 year old male with some blood in stool, irregular stool, discharge at times.  Cancer runs in family, is concerned.  Would like colonoscopy    Hyperlipidemia: recheck      Elevated glucose: rcheck    Anxeity: on venalfaxine, needs fills.  Also never got the rare prn use ativan, would like to have those on hand    Insomnia: try something?     Current Outpatient Medications   Medication Sig Dispense Refill    LORazepam (ATIVAN) 1 MG tablet Take 1 tablet (1 mg) by mouth daily as needed for anxiety 20 tablet 0    venlafaxine (EFFEXOR XR) 75 MG 24 hr capsule Take 1 capsule (75 mg) by mouth daily 90 capsule 3     No current facility-administered medications for this visit.       O:/78   Pulse 106   Temp 98.6  F (37  C) (Tympanic)   Resp 16   Ht 1.924 m (6' 3.75\")   Wt 134.7 kg (297 lb)   SpO2 98%   BMI 36.39 kg/m    GEN: Alert and oriented, in no acute distress  ENT: oropharynx clear, no exudate or palate/tonsil asymmetry  Neck: neck supple without mass or lymphadenopathy            A: blood in stool, irregular stool, discharge at times.        Hyperlipidemia: recheck        Elevated glucose: rcheck      Anxeity: on venalfaxine, needs fills. Stable      Insomnia: try something?     P: colnooscopy.  Fills on venalfaxine.  Some rare ativan to use prn  Updae labs.  Try tylenol pm prn.    Follow up depends on result.s      The longitudinal plan of care for the diagnosis(es)/condition(s) as documented were addressed during this visit. Due to the added complexity in care, I will continue to support Rogelio in the subsequent management and with ongoing continuity of care.      "

## 2024-05-06 RX ORDER — FLUOXETINE HYDROCHLORIDE 90 MG/1
1 CAPSULE, DELAYED RELEASE PELLETS ORAL WEEKLY
COMMUNITY

## 2024-05-06 RX ORDER — METHOCARBAMOL 500 MG/1
500 TABLET, FILM COATED ORAL 4 TIMES DAILY PRN
COMMUNITY

## 2024-05-06 RX ORDER — LISINOPRIL 20 MG/1
20 TABLET ORAL DAILY
COMMUNITY
End: 2024-06-12

## 2024-05-09 ENCOUNTER — ANESTHESIA EVENT (OUTPATIENT)
Dept: GASTROENTEROLOGY | Facility: CLINIC | Age: 34
End: 2024-05-09
Payer: COMMERCIAL

## 2024-05-09 ENCOUNTER — OFFICE VISIT (OUTPATIENT)
Dept: PALLIATIVE MEDICINE | Facility: OTHER | Age: 34
End: 2024-05-09
Attending: STUDENT IN AN ORGANIZED HEALTH CARE EDUCATION/TRAINING PROGRAM
Payer: COMMERCIAL

## 2024-05-09 VITALS
HEART RATE: 74 BPM | BODY MASS INDEX: 36.02 KG/M2 | OXYGEN SATURATION: 97 % | DIASTOLIC BLOOD PRESSURE: 84 MMHG | WEIGHT: 294 LBS | SYSTOLIC BLOOD PRESSURE: 136 MMHG

## 2024-05-09 DIAGNOSIS — G57.02 PIRIFORMIS SYNDROME, LEFT: ICD-10-CM

## 2024-05-09 DIAGNOSIS — M54.16 LUMBAR RADICULOPATHY: Primary | ICD-10-CM

## 2024-05-09 PROCEDURE — 99213 OFFICE O/P EST LOW 20 MIN: CPT | Performed by: STUDENT IN AN ORGANIZED HEALTH CARE EDUCATION/TRAINING PROGRAM

## 2024-05-09 PROCEDURE — 99214 OFFICE O/P EST MOD 30 MIN: CPT | Performed by: STUDENT IN AN ORGANIZED HEALTH CARE EDUCATION/TRAINING PROGRAM

## 2024-05-09 RX ORDER — IBUPROFEN 400 MG/1
800 TABLET, FILM COATED ORAL EVERY 6 HOURS PRN
COMMUNITY

## 2024-05-09 RX ORDER — ACETAMINOPHEN 500 MG
1000 TABLET ORAL EVERY 6 HOURS PRN
COMMUNITY

## 2024-05-09 RX ORDER — GABAPENTIN 300 MG/1
300 CAPSULE ORAL 3 TIMES DAILY
Qty: 90 CAPSULE | Refills: 0 | Status: SHIPPED | OUTPATIENT
Start: 2024-05-09 | End: 2024-07-02

## 2024-05-09 ASSESSMENT — PAIN SCALES - GENERAL: PAINLEVEL: EXTREME PAIN (9)

## 2024-05-09 NOTE — PROGRESS NOTES
Patient presents to the clinic today for a visit with Armaan Sanchez MD regarding Pain Management.          Notes Last two months have been worse than normal. Methocarbamol makes him drowsy. One and two don't do anything. Tylenol helps a little. Hurts to sit stand and walk.     Lisa Delong  [facility] Clinical Assistant

## 2024-05-09 NOTE — PATIENT INSTRUCTIONS
Physical Therapy:  Continue HEP  Diagnostic Studies:  Updated MRI Lumbar spine ordered  Medication Management:    Gabapentin 300mg TID ordered - Take 1 capsule at nighttime only for the first 7 days, and if tolerated well without dizziness or drowsiness, add the daytime doses for a total of 1 capsule 3 times daily  Continue Methocarbamol 500mg up to 4 times daily as needed  Further procedures recommended:   Left L5 and S1 TFESI ordered  Consider repeat left piriformis muscle injection - patient ok to call us if needed  Follow up: 3 months    Armaan Sanchez MD  Interventional Pain Medicine  [facility] Number:  [phone]  Call with any questions about your care and for scheduling assistance.   Calls are returned Monday through Friday between 8 AM and 4:30 PM. We usually get back to you within 2 business days depending on the issue/request.    If we are prescribing your medications:  For opioid medication refills, call the clinic or send a [employer] message 7 days in advance.  Please include:  Name of requested medication  Name of the pharmacy.  For non-opioid medications, call your pharmacy directly to request a refill. Please allow 3-4 days to be processed.   Per [place] State Law:  All controlled substance prescriptions must be filled within 30 days of being written.    For those controlled substances allowing refills, pickup must occur within 30 days of last fill.      We believe regular attendance is key to your success in our program!    Any time you are unable to keep your appointment we ask that you call us at least 24 hours in advance to cancel.This will allow us to offer the appointment time to another patient.   Multiple missed appointments may lead to dismissal from the clinic.

## 2024-05-09 NOTE — PROGRESS NOTES
[facility] Interventional Follow-up    Date of visit: 5/9/2024    Chief complaint:   Chief Complaint   Patient presents with    Pain       Interval history:  Since his last visit, Rogelio Noel reports:  Pain has worsened in the last 2 months.  Pain is in left buttock with radiation down posterior and lateral thigh, proximal calf.     Pain scores:  Pain intensity currently is 9 on a scale of 0-10.     Current pain medications include:  Tylenol - helps but takes very infrequently  Methocarbamol - helpful     Past pain medications  Ibuprofen     Other treatments have included:  Rogelio Noel has been seen at a pain clinic in the past.  Dr. Clark  PT: doing yoga at home. Undergoing - HEP helped but completed now  Injections:                L L5 TFESI x3 most recently 9/27/22 all with Dr. Clark - 1st helped 1 year, 2nd helped less, 3rd was very difficult to tolerate and then got a few weeks of relief              3/8/23 - L piriformis injection with me - helpful for a month  Surgery: none  Alternative: none recently    Side Effects: no side effect    Medications:  Current Outpatient Medications   Medication Sig Dispense Refill    FLUoxetine (PROZAC WEEKLY) 90 MG DR capsule Take 1 mg by mouth once a week Unknown dose      lisinopril (ZESTRIL) 20 MG tablet Take 20 mg by mouth daily      LORazepam (ATIVAN) 1 MG tablet Take 1 tablet (1 mg) by mouth daily as needed for anxiety 20 tablet 0    methocarbamol (ROBAXIN) 500 MG tablet Take 500 mg by mouth 4 times daily as needed for muscle spasms         Medical History: any changes in medical history since they were last seen? No    Physical Exam:  Weight 133.4 kg (294 lb).  Constitutional: Well developed, NAD  Head: normocephalic. Atraumatic.   Eyes: no redness or jaundice noted   CV: warm, well perfused extremities   Skin: no suspicious lesions or rashes   Psychiatric: mentation appears normal and affect  full  Focused MSK exam: Positive left seated slump test    Diagnostics:  non    Personally reviewed: N/A    Assessment:   1. Piriformis syndrome  2. Lumbar radiculopathy     Rogelio Noel is a 34 year old male presenting for follow-up of his left buttock pain with radiation down the left posterior thigh and calf.  More recently.  We have previously managed this interventionally with TFESI, piriformis muscle injections.  Both have been successful, but the TFESI at some point has helped for a full year, while some have helped at all.  He notes the methocarbamol is not helping much these days.  He notes the pain is overall is much worse than it typically has been.  His last MRI was in 2021.  Today, we we will initiate gabapentin 300 mg 3 times daily.  We will obtain a new MRI of the lumbar spine.  We will perform left L5 and S1 two-level TFESI.  Will see him back in 3 months.  We may consider surgical referral or resuming piriformis muscle injections if appropriate.    Plan:  Physical Therapy:  Continue HEP  Diagnostic Studies:  Updated MRI Lumbar spine ordered  Medication Management:    Gabapentin 300mg TID ordered - Take 1 capsule at nighttime only for the first 7 days, and if tolerated well without dizziness or drowsiness, add the daytime doses for a total of 1 capsule 3 times daily  Continue Methocarbamol 500mg up to 4 times daily as needed  Further procedures recommended:   Left L5 and S1 TFESI ordered  Consider repeat left piriformis muscle injection - patient ok to call us if needed  Follow up: 3 months    Armaan Sanchez MD  Interventional Pain Medicine  [facility]

## 2024-05-09 NOTE — ANESTHESIA PREPROCEDURE EVALUATION
"Anesthesia Pre-Procedure Evaluation    Patient: Rogelio Noel   MRN: [id number] : 1990        Procedure : Procedure(s):  Colonoscopy          History reviewed. No pertinent past medical history.   History reviewed. No pertinent surgical history.   No Known Allergies   Social History     Tobacco Use    Smoking status: Never     Passive exposure: Never    Smokeless tobacco: Never   Substance Use Topics    Alcohol use: Yes     Comment: Beer once or twice a month       Wt Readings from Last 1 Encounters:   24 134.7 kg (297 lb)        Anesthesia Evaluation            ROS/MED HX  ENT/Pulmonary:  - neg pulmonary ROS     Neurologic:  - neg neurologic ROS     Cardiovascular:  - neg cardiovascular ROS     METS/Exercise Tolerance:     Hematologic:  - neg hematologic  ROS     Musculoskeletal:       GI/Hepatic:       Renal/Genitourinary:       Endo:     (+)               Obesity,       Psychiatric/Substance Use:     (+) psychiatric history anxiety       Infectious Disease:       Malignancy:       Other:          Physical Exam    Airway        Mallampati: II   TM distance: > 3 FB   Neck ROM: full   Mouth opening: > 3 cm    Respiratory Devices and Support  Comment: Not on oxygen currently       Dental  no notable dental history         Cardiovascular   cardiovascular exam normal          Pulmonary   pulmonary exam normal                OUTSIDE LABS:  CBC: No results found for: \"WBC\", \"HGB\", \"HCT\", \"PLT\"  BMP:   Lab Results   Component Value Date     2024     2022    POTASSIUM 4.3 2024    POTASSIUM 4.3 2022    CHLORIDE 102 2024    CHLORIDE 103 2022    CO2 25 2024    CO2 27 2022    BUN 26.2 (H) 2024    BUN 25.0 (H) 2022    CR 1.09 2024    CR 1.12 2022     (H) 2024     (H) 2022     COAGS: No results found for: \"PTT\", \"INR\", \"FIBR\"  POC: No results found for: \"BGM\", \"HCG\", \"HCGS\"  HEPATIC: No results found for: " "\"ALBUMIN\", \"PROTTOTAL\", \"ALT\", \"AST\", \"GGT\", \"ALKPHOS\", \"BILITOTAL\", \"BILIDIRECT\", \"BORIS\"  OTHER:   Lab Results   Component Value Date    A1C 5.1 05/01/2024    WALT 9.3 05/01/2024    TSH 1.24 02/11/2020       Anesthesia Plan    ASA Status:  2       Anesthesia Type: General.   Induction: Intravenous, Propofol.   Maintenance: TIVA.        Consents    Anesthesia Plan(s) and associated risks, benefits, and realistic alternatives discussed. Questions answered and patient/representative(s) expressed understanding.     - Discussed: Risks, Benefits and Alternatives for BOTH SEDATION and the PROCEDURE were discussed     - Discussed with:  Patient            Postoperative Care    Pain management: IV analgesics, Oral pain medications, Multi-modal analgesia.   PONV prophylaxis: Ondansetron (or other 5HT-3), Dexamethasone or Solumedrol     Comments:    Other Comments: Patient aware of plan, what to expect, and potential risks. Timeout was performed before bringing the patient back to OR, and once again, patient was asked if they had any questions           JM Ram CRNA    I have reviewed the pertinent notes and labs in the chart from the past 30 days and (re)examined the patient.  Any updates or changes from those notes are reflected in this note.              # Obesity: Estimated body mass index is 36.39 kg/m  as calculated from the following:    Height as of 5/1/24: 1.924 m (6' 3.75\").    Weight as of 5/1/24: 134.7 kg (297 lb).      "

## 2024-05-10 ENCOUNTER — ANESTHESIA (OUTPATIENT)
Dept: GASTROENTEROLOGY | Facility: CLINIC | Age: 34
End: 2024-05-10
Payer: COMMERCIAL

## 2024-05-10 ENCOUNTER — HOSPITAL ENCOUNTER (OUTPATIENT)
Facility: CLINIC | Age: 34
Discharge: HOME | End: 2024-05-10
Attending: STUDENT IN AN ORGANIZED HEALTH CARE EDUCATION/TRAINING PROGRAM | Admitting: STUDENT IN AN ORGANIZED HEALTH CARE EDUCATION/TRAINING PROGRAM
Payer: COMMERCIAL

## 2024-05-10 VITALS
HEIGHT: 76 IN | BODY MASS INDEX: 35.8 KG/M2 | HEART RATE: 82 BPM | OXYGEN SATURATION: 99 % | WEIGHT: 294 LBS | SYSTOLIC BLOOD PRESSURE: 120 MMHG | TEMPERATURE: 97.8 F | RESPIRATION RATE: 16 BRPM | DIASTOLIC BLOOD PRESSURE: 79 MMHG

## 2024-05-10 LAB — COLONOSCOPY: NORMAL

## 2024-05-10 PROCEDURE — 370N000017 HC ANESTHESIA TECHNICAL FEE, PER MIN: Performed by: STUDENT IN AN ORGANIZED HEALTH CARE EDUCATION/TRAINING PROGRAM

## 2024-05-10 PROCEDURE — 250N000009 HC RX 250: Performed by: PHYSICIAN ASSISTANT

## 2024-05-10 PROCEDURE — 88305 TISSUE EXAM BY PATHOLOGIST: CPT | Mod: TC | Performed by: STUDENT IN AN ORGANIZED HEALTH CARE EDUCATION/TRAINING PROGRAM

## 2024-05-10 PROCEDURE — 258N000003 HC RX IP 258 OP 636: Performed by: PHYSICIAN ASSISTANT

## 2024-05-10 PROCEDURE — 45385 COLONOSCOPY W/LESION REMOVAL: CPT | Performed by: STUDENT IN AN ORGANIZED HEALTH CARE EDUCATION/TRAINING PROGRAM

## 2024-05-10 PROCEDURE — 258N000003 HC RX IP 258 OP 636

## 2024-05-10 PROCEDURE — 88305 TISSUE EXAM BY PATHOLOGIST: CPT | Mod: 26 | Performed by: PATHOLOGY

## 2024-05-10 PROCEDURE — 250N000011 HC RX IP 250 OP 636

## 2024-05-10 RX ORDER — ONDANSETRON 2 MG/ML
4 INJECTION INTRAMUSCULAR; INTRAVENOUS EVERY 30 MIN PRN
Status: DISCONTINUED | OUTPATIENT
Start: 2024-05-10 | End: 2024-05-10 | Stop reason: HOSPADM

## 2024-05-10 RX ORDER — SODIUM CHLORIDE, SODIUM LACTATE, POTASSIUM CHLORIDE, CALCIUM CHLORIDE 600; 310; 30; 20 MG/100ML; MG/100ML; MG/100ML; MG/100ML
INJECTION, SOLUTION INTRAVENOUS CONTINUOUS
Status: DISCONTINUED | OUTPATIENT
Start: 2024-05-10 | End: 2024-05-10 | Stop reason: HOSPADM

## 2024-05-10 RX ORDER — DEXAMETHASONE SODIUM PHOSPHATE 4 MG/ML
4 INJECTION, SOLUTION INTRA-ARTICULAR; INTRALESIONAL; INTRAMUSCULAR; INTRAVENOUS; SOFT TISSUE
Status: DISCONTINUED | OUTPATIENT
Start: 2024-05-10 | End: 2024-05-10 | Stop reason: HOSPADM

## 2024-05-10 RX ORDER — NALOXONE HYDROCHLORIDE 0.4 MG/ML
0.4 INJECTION, SOLUTION INTRAMUSCULAR; INTRAVENOUS; SUBCUTANEOUS
Status: DISCONTINUED | OUTPATIENT
Start: 2024-05-10 | End: 2024-05-10 | Stop reason: HOSPADM

## 2024-05-10 RX ORDER — NALOXONE HYDROCHLORIDE 0.4 MG/ML
0.2 INJECTION, SOLUTION INTRAMUSCULAR; INTRAVENOUS; SUBCUTANEOUS
Status: DISCONTINUED | OUTPATIENT
Start: 2024-05-10 | End: 2024-05-10 | Stop reason: HOSPADM

## 2024-05-10 RX ORDER — LIDOCAINE 40 MG/G
CREAM TOPICAL
Status: DISCONTINUED | OUTPATIENT
Start: 2024-05-10 | End: 2024-05-10 | Stop reason: HOSPADM

## 2024-05-10 RX ORDER — ONDANSETRON 4 MG/1
4 TABLET, ORALLY DISINTEGRATING ORAL EVERY 30 MIN PRN
Status: DISCONTINUED | OUTPATIENT
Start: 2024-05-10 | End: 2024-05-10 | Stop reason: HOSPADM

## 2024-05-10 RX ORDER — FENTANYL CITRATE 50 UG/ML
25 INJECTION, SOLUTION INTRAMUSCULAR; INTRAVENOUS
Status: DISCONTINUED | OUTPATIENT
Start: 2024-05-10 | End: 2024-05-10 | Stop reason: HOSPADM

## 2024-05-10 RX ORDER — OXYCODONE HYDROCHLORIDE 5 MG/1
5 TABLET ORAL
Status: DISCONTINUED | OUTPATIENT
Start: 2024-05-10 | End: 2024-05-10 | Stop reason: HOSPADM

## 2024-05-10 RX ORDER — PROPOFOL 10 MG/ML
INJECTION, EMULSION INTRAVENOUS PRN
Status: DISCONTINUED | OUTPATIENT
Start: 2024-05-10 | End: 2024-05-10

## 2024-05-10 RX ORDER — OXYCODONE HYDROCHLORIDE 5 MG/1
10 TABLET ORAL
Status: DISCONTINUED | OUTPATIENT
Start: 2024-05-10 | End: 2024-05-10 | Stop reason: HOSPADM

## 2024-05-10 RX ORDER — NALOXONE HYDROCHLORIDE 0.4 MG/ML
0.1 INJECTION, SOLUTION INTRAMUSCULAR; INTRAVENOUS; SUBCUTANEOUS
Status: DISCONTINUED | OUTPATIENT
Start: 2024-05-10 | End: 2024-05-10 | Stop reason: HOSPADM

## 2024-05-10 RX ORDER — FLUMAZENIL 0.1 MG/ML
0.2 INJECTION, SOLUTION INTRAVENOUS
Status: DISCONTINUED | OUTPATIENT
Start: 2024-05-10 | End: 2024-05-10 | Stop reason: HOSPADM

## 2024-05-10 RX ADMIN — SODIUM CHLORIDE, POTASSIUM CHLORIDE, SODIUM LACTATE AND CALCIUM CHLORIDE: 600; 310; 30; 20 INJECTION, SOLUTION INTRAVENOUS at 09:26

## 2024-05-10 RX ADMIN — PHENYLEPHRINE HYDROCHLORIDE 100 MCG: 10 INJECTION INTRAVENOUS at 11:01

## 2024-05-10 RX ADMIN — PROPOFOL 250 MCG/KG/MIN: 10 INJECTION, EMULSION INTRAVENOUS at 10:30

## 2024-05-10 RX ADMIN — PHENYLEPHRINE HYDROCHLORIDE 100 MCG: 10 INJECTION INTRAVENOUS at 11:04

## 2024-05-10 RX ADMIN — LIDOCAINE HYDROCHLORIDE 0.1 ML: 10 INJECTION, SOLUTION EPIDURAL; INFILTRATION; INTRACAUDAL; PERINEURAL at 09:27

## 2024-05-10 RX ADMIN — PROPOFOL 100 MG: 10 INJECTION, EMULSION INTRAVENOUS at 10:28

## 2024-05-10 ASSESSMENT — ACTIVITIES OF DAILY LIVING (ADL)
ADLS_ACUITY_SCORE: 35
ADLS_ACUITY_SCORE: 33
ADLS_ACUITY_SCORE: 35

## 2024-05-10 NOTE — LETTER
Rogelio Noel  [id number] [address] [facility] [address]      May 13, 2024    Dear Rogelio,  This letter is written to inform you of the results of your recent colonoscopy.  Your examination showed polyp(s) in your transverse colon and sigmoid colon. All polyps were removed in their entirety and sent for review by a pathologist. As you will see on the pathology report below, the tissue(s) were tubular adenomatous polyps and normal polyps. Your examination was otherwise without abnormality.    Adenomatous polyps are entirely benign (non-cancerous); however, patients who have developed these polyps are at an increased risk for developing additional polyps in the future. If these are not eventually removed, there is a risk of developing colon cancer. We will advise more frequent examinations with you because of the risk associated with this type of polyp.  Normal polyps are polyps that test negative for any pathologic (abnormal) changes.    Given these findings, I recommend that you undergo a repeat colonoscopy in 7 year(s) for surveillance. We will enter you into a recall system so you receive a reminder closer to the time that you are due for repeat examination. Your physician also recommends that you adhere to a high fiber diet indefinitely to promote colon health.     Please remember that this recommendation is made with the understanding that you are not experiencing persistent changes in bowel function, bleeding per rectum, and/or significant abdominal pain. If you experience these symptoms, please contact your primary care provider for a further evaluation.     If you have any questions or concerns about the results of your colonoscopy or the appropriate follow-up, please contact my assistant at [phone]    Sincerely,      Collins Palacios MD   [facility]  ___                    Resulted Orders   Surgical Pathology Exam   Result Value Ref Range    Case Report       Surgical Pathology Report                          Case: [id number]                                  Authorizing Provider:  Collins Palacios MD       Collected:           05/10/2024 10:53 AM          Ordering Location:     [facility]   Received:            05/10/2024 11:11 AM                                 [place]                                                                      Pathologist:           Heidi Chou MD                                                                           Specimens:   A) - Large Intestine, Colon, Transverse, transverse colon polyp                                     B) - Large Intestine, Colon, Sigmoid, sigmoid polyp                                        Final Diagnosis       A: Large intestine, transverse, polypectomy:  -Focal adenomatous change consistent with diminutive tubular adenoma, most of tissue sample is nonneoplastic colonic mucosa with lymphoid aggregates    B: Large intestine, sigmoid, polypectomy (2 polyps described endoscopically):  -Nonneoplastic colonic mucosa without evidence of adenomatous or serrated changes; findings within normal microscopic limits; multiple tissue levels evaluated        Clinical Information       Procedure:  COLONOSCOPY, FLEXIBLE, WITH LESION REMOVAL USING SNARE  Pre-op Diagnosis: Blood in stool [K92.1]  Post-op Diagnosis: K92.1 - Blood in stool [ICD-10-CM]    Excerpt from colonoscopy note:    Impression:     - Perianal rash found on perianal exam.                          - The examined portion of the ileum was normal.                          - One 4 mm polyp in the transverse colon, removed with                          a cold snare. Resected and retrieved.                          - Two 4 to 7 mm polyps in the sigmoid colon, removed                          with a cold snare. Resected and retrieved       Gross Description       A(1). Large Intestine, Colon,  "Transverse, transverse colon polyp:  The specimen is received in formalin, labeled with the patient's name, medical record number and other identifying information and designated  transverse colon polyp . It consists of multiple, less than 0.1-0.3 cm pale-tan soft tissue fragments admixed with colonic debris.  The specimen is filtered and submitted entirely in 1 cassette.   B(2). Large Intestine, Colon, Sigmoid, sigmoid polyp:  The specimen is received in formalin, labeled with the patient's name, medical record number and other identifying information designated \"sigmoid polyp\". It consists of 3, 0.6-1.2 cm pale-tan soft tissue fragments which are differentially inked, are submitted entirely as follows:    B1-2 soft tissue fragments (1 inked red-trisected/1 inked blue-trisected)  B2-1 soft tissue fragment (inked black-5 pieces)   (AMBAR Clayton (ASCP) 5/10/2024 2:52 PM       Microscopic Description       A, B.  A formal microscopic examination has been performed      Performing Labs       The technical component of this testing was completed at [facility].    Stain controls for all stains resulted within this report have been reviewed and show appropriate reactivity.       Case Images       "

## 2024-05-10 NOTE — H&P
[facility]    Pre-Endoscopy History and Physical     Rogelio Noel MRN# [id number]   YOB: 1990 Age: 34 year old     Date of Procedure: 5/10/2024  Primary care provider: Mitesh Bai  Type of Endoscopy: Colonoscopy with possible biopsy, possible polypectomy  Reason for Procedure: Diagnostic colonoscopy  Type of Anesthesia Anticipated: Conscious Sedation    HPI:    Rogelio is a 34 year old male who will be undergoing the above procedure.      Reports 1-2 times per month episodes of loose stool and irritation of the anus. He reports occasional rash with itching and bleeding. Denies abdominal pain, change in stool caliber, or unintended weight loss. Reports worse with spicy foods. No prior colonoscopies. Reports Fhx of colon cancer. No AC.    A history and physical has been performed. The patient's medications and allergies have been reviewed. The risks and benefits of the procedure and the sedation options and risks were discussed with the patient.  All questions were answered and informed consent was obtained.      He denies a personal or family history of anesthesia complications or bleeding disorders.     Patient Active Problem List   Diagnosis    Morbid obesity (H)    Elevated glucose    Hyperlipidemia LDL goal <100    Primary insomnia    Anxiety        History reviewed. No pertinent past medical history.     History reviewed. No pertinent surgical history.    Social History     Tobacco Use    Smoking status: Never     Passive exposure: Never    Smokeless tobacco: Never   Substance Use Topics    Alcohol use: Yes     Comment: Beer once or twice a month        Family History   Problem Relation Age of Onset    Cancer Mother     Diabetes Mother     Hypertension Mother     Obesity Mother     Diabetes Father     Hypertension Father     Obesity Father     Hypertension Brother        Prior to Admission medications    Medication Sig Start Date End Date Taking? Authorizing Provider  "  FLUoxetine (PROZAC WEEKLY) 90 MG DR capsule Take 1 mg by mouth once a week Unknown dose   Yes Reported, Patient   lisinopril (ZESTRIL) 20 MG tablet Take 20 mg by mouth daily   Yes Reported, Patient   LORazepam (ATIVAN) 1 MG tablet Take 1 tablet (1 mg) by mouth daily as needed for anxiety 5/1/24  Yes Mitesh Bai MD   methocarbamol (ROBAXIN) 500 MG tablet Take 500 mg by mouth 4 times daily as needed for muscle spasms   Yes Reported, Patient   acetaminophen (TYLENOL) 500 MG tablet Take 1,000 mg by mouth every 6 hours as needed for mild pain    Reported, Patient   gabapentin (NEURONTIN) 300 MG capsule Take 1 capsule (300 mg) by mouth 3 times daily Take 1 capsule at nighttime only for the first 7 days, and if tolerated well without dizziness or drowsiness, add the daytime doses for a total of 1 capsule 3 times daily 5/9/24   Armaan Sanchez MD   ibuprofen (ADVIL/MOTRIN) 400 MG tablet Take 800 mg by mouth every 6 hours as needed for moderate pain Usually takes two.    Reported, Patient       No Known Allergies     REVIEW OF SYSTEMS:   5 point ROS negative except as noted above in HPI, including Gen., Resp., CV, GI &  system review.    PHYSICAL EXAM:   BP (!) 135/92 (BP Location: Left arm)   Pulse 86   Temp 98.7  F (37.1  C) (Oral)   Resp 16   Ht 1.924 m (6' 3.75\")   Wt 133.4 kg (294 lb)   SpO2 97%   BMI 36.02 kg/m   Estimated body mass index is 36.02 kg/m  as calculated from the following:    Height as of this encounter: 1.924 m (6' 3.75\").    Weight as of this encounter: 133.4 kg (294 lb).   Constitutional: Awake, alert, no acute distress.  Eyes: No scleral icterus.  Conjunctiva are without injection.  ENMT: Mucous membranes moist, dentition and gums are intact.   Neck: Soft, supple, trachea midline.    Endocrine: n/a   Lymphatic: There is no cervical, submandibularadenopathy.  Respiratory: normal effortgs   Cardiovascular: S1, S2  Abdomen: Non-distended, non-tender,  No masses,  Musculoskeletal: No " spinal or CVA tenderness. Full range of motion in the upper and lower extremities.    Skin: No skin rashes or lesions to inspection.  No petechia.    Neurologic: alerted and oriented 3x  Psychiatric: The patient's affect is not blunted and mood is appropriate.  DIAGNOSTICS:    Not indicated    IMPRESSION   ASA Class 1 - Healthy patient, no medical problems    PLAN:   Plan for Colonoscopy with possible biopsy, possible polypectomy. We discussed the risks, benefits and alternatives and the patient wished to proceed.  Patient is cleared for the above procedure.    The above has been forwarded to the consulting provider.    Ozzy Paige, DO PGY2  [facility]

## 2024-05-10 NOTE — ANESTHESIA POSTPROCEDURE EVALUATION
Patient: Rogelio Noel    Procedure: Procedure(s):  COLONOSCOPY, FLEXIBLE, WITH LESION REMOVAL USING SNARE       Anesthesia Type:  General    Note:  Disposition: Outpatient   Postop Pain Control: Uneventful            Sign Out: Well controlled pain   PONV: No   Neuro/Psych: Uneventful            Sign Out: Acceptable/Baseline neuro status   Airway/Respiratory: Uneventful            Sign Out: Acceptable/Baseline resp. status   CV/Hemodynamics: Uneventful            Sign Out: Acceptable CV status; No obvious hypovolemia; No obvious fluid overload   Other NRE:    DID A NON-ROUTINE EVENT OCCUR?            Last vitals:  Vitals Value Taken Time   /79 05/10/24 1144   Temp 36.6  C (97.8  F) 05/10/24 1110   Pulse 84 05/10/24 1144   Resp 16 05/10/24 1140   SpO2 99 % 05/10/24 1140   Vitals shown include unfiled device data.    Electronically Signed By: JM Toro CRNA  May 10, 2024  11:52 AM

## 2024-05-10 NOTE — ANESTHESIA CARE TRANSFER NOTE
Patient: Rogelio Noel    Procedure: Procedure(s):  COLONOSCOPY, FLEXIBLE, WITH LESION REMOVAL USING SNARE       Diagnosis: Blood in stool [K92.1]  Diagnosis Additional Information: No value filed.    Anesthesia Type:   General     Note:    Oropharynx: oropharynx clear of all foreign objects  Level of Consciousness: drowsy      Independent Airway: airway patency satisfactory and stable  Dentition: dentition unchanged  Vital Signs Stable: post-procedure vital signs reviewed and stable  Report to RN Given: handoff report given  Patient transferred to: Phase II    Handoff Report: Identifed the Patient, Identified the Reponsible Provider, Reviewed the pertinent medical history, Discussed the surgical course, Reviewed Intra-OP anesthesia mangement and issues during anesthesia, Set expectations for post-procedure period and Allowed opportunity for questions and acknowledgement of understanding      Vitals:  Vitals Value Taken Time   /79 05/10/24 1144   Temp 36.6  C (97.8  F) 05/10/24 1110   Pulse 84 05/10/24 1144   Resp 16 05/10/24 1140   SpO2 99 % 05/10/24 1140   Vitals shown include unfiled device data.    Electronically Signed By: JM Toro CRNA  May 10, 2024  11:51 AM

## 2024-05-13 LAB
PATH REPORT.COMMENTS IMP SPEC: NORMAL
PATH REPORT.COMMENTS IMP SPEC: NORMAL
PATH REPORT.FINAL DX SPEC: NORMAL
PATH REPORT.GROSS SPEC: NORMAL
PATH REPORT.MICROSCOPIC SPEC OTHER STN: NORMAL
PATH REPORT.RELEVANT HX SPEC: NORMAL
PHOTO IMAGE: NORMAL

## 2024-05-22 ENCOUNTER — TELEPHONE (OUTPATIENT)
Dept: PALLIATIVE MEDICINE | Facility: OTHER | Age: 34
End: 2024-05-22

## 2024-05-22 NOTE — TELEPHONE ENCOUNTER
Reason for Call:  Other returning call    Detailed comments: Patient returned clinic's call about moving up his 6/5/2024 injection with Dr. Sanchez. Patient stated he would prefer to have this done sooner rather than later.    Requested to be called back.    Phone Number Patient can be reached at: Cell number on file:    Telephone Information:   Mobile [phone]       Best Time: anytime before 2:30 today    Can we leave a detailed message on this number? YES    Call taken on 5/22/2024 at 11:16 AM by Gris Trejo

## 2024-05-24 ENCOUNTER — HOSPITAL ENCOUNTER (OUTPATIENT)
Dept: MRI IMAGING | Facility: CLINIC | Age: 34
Discharge: HOME | End: 2024-05-24
Attending: STUDENT IN AN ORGANIZED HEALTH CARE EDUCATION/TRAINING PROGRAM | Admitting: STUDENT IN AN ORGANIZED HEALTH CARE EDUCATION/TRAINING PROGRAM
Payer: COMMERCIAL

## 2024-05-24 ENCOUNTER — PATIENT OUTREACH (OUTPATIENT)
Dept: GASTROENTEROLOGY | Facility: CLINIC | Age: 34
End: 2024-05-24
Payer: COMMERCIAL

## 2024-05-24 DIAGNOSIS — M54.16 LUMBAR RADICULOPATHY: ICD-10-CM

## 2024-05-24 PROCEDURE — 72148 MRI LUMBAR SPINE W/O DYE: CPT

## 2024-05-30 ENCOUNTER — TELEPHONE (OUTPATIENT)
Dept: PALLIATIVE MEDICINE | Facility: OTHER | Age: 34
End: 2024-05-30
Payer: COMMERCIAL

## 2024-05-30 DIAGNOSIS — M54.16 LUMBAR RADICULOPATHY: Primary | ICD-10-CM

## 2024-05-30 NOTE — TELEPHONE ENCOUNTER
I updated the form that was done in April 2024  that the appt was cancelled this is a reschedule appt.

## 2024-05-30 NOTE — TELEPHONE ENCOUNTER
"Screening Questions for Radiology Injections:Left L5 and S1 TFESI - two levels     Injection to be done at which interventional clinic site? [facility]    If choosing [facility] for location, please inform patient:  \"[facility] is a Hospital based clinic. Before your visit, you should check with your insurance about how it covers the charges for facility services in a hospital-based clinic.     Procedure ordered by Dr Sanchez    Procedure ordered? Left L5 and S1 TFESI - two levels   Transforaminal Cervical CÉSAR - Send to [place] ([facility]) - No [facility] Site providers perform this procedure    What insurance would patient like us to bill for this procedure? -[employer] [place] ADVANTAGE   IF SCHEDULING IN [place] PAIN OR SPINE PLEASE SCHEDULE AT LEAST 7-10 BUSINESS DAYS OUT SO A PA CAN BE OBTAINED  Worker's comp or MVA (motor vehicle accident) -Any injection DO NOT SCHEDULE and route to Brian Villalpando    [employer] insurance - For SI joint injections, DO NOT SCHEDULE and route to Radha Richardson.     ALL BCBS, [employer] and [employer] CIGNA - DO NOT SCHEDULE and route to Radha Richardson  MEDICA- ALL INJECTIONS- route to Radha Dylan    Is patient scheduled at [facility]? No    If YES, route every encounter to [facility] CARE NAVIGATION POOL [[phone]]    Is an  needed? No     Patient has a  home? (Review Grid) YES: informed     Any chance of pregnancy? Not Applicable   If YES, do NOT schedule and route to RN pool  - Dr. Lerner route to Susy Hoffman and PM&R Nurse  [[id number]]      Is patient actively being treated for cancer or immunocompromised? No  If YES, do NOT schedule and route to RN pool/ Dr. Lerner's Team    Does the patient have a bleeding or clotting disorder? No   If YES, okay to schedule AND route to ZAC montelongo / Dr. Lerner's Team   (For any patients with platelet count <100, RN must forward to provider)    Is patient taking any Blood Thinners OR " Antiplatelet medication?  No   If hold needed, do NOT schedule, route to RN pool/ Dr. Serranos Team  Examples:   Blood Thinners: (Coumadin, Warfarin, Jantoven, Pradaxa, Xarelto, Eliquis, Edoxaban, Enoxaparin, Lovenox, Heparin, Arixtra, Fondaparinux or Fragmin)  Antiplatelet Medications: (Plavix, Brilinta or Effient)     Is patient taking any aspirin products (includes Excedrin and Fiorinal)? No   If yes route to RN pool/ Dr. Serranos Team - Do not schedule      Any allergies to contrast dye, iodine, shellfish, or numbing and steroid medications? No  If YES, schedule and add allergy information to appointment notes AND route to the RN pool/ Dr. Serranos Team  If CÉSAR and Contrast Dye / Iodine Allergy? DO NOT SCHEDULE, route to RN pool/ Dr. Khan Team  Allergies: Patient has no known allergies.     Does patient have an active infection or treated for one within the past week? No  Is patient currently taking any antibiotics or steroid medications?  No   For patients on chronic, preventative, or prophylactic antibiotics, procedures may be scheduled.   For patients on antibiotics for active or recent infection, schedule 4 days after completed.  For patients on steroid medications, schedule 4 days after completed.     Has the patient had a flu shot or any other vaccinations within the past 7 days? No  If yes, explain that for the vaccine to work best they need to:     wait 1 week before and 1 week after getting any Vaccine  wait 1 week before and 2 weeks after getting any Covid Vaccine   If patient has concerns about the timing, send to RN pool/ Dr. Serranos Team    Does patient have an MRI/CT? MRI  5/24/24  Include Date and Check Procedure Scheduling Grid to see if required.  Was the MRI/CT done within the last 3 years?  Yes   If no route to RN Pool/ Dr. Serranos Team  If yes, where was the MRI/CT done? [facility]   Refer to PACS Transmissions list for approved external locations and route to RN Pool High Priority/   Eduarda's Team  If MRI was not done at approved external location do NOT schedule and route to RN pool/ Dr. Lerner's Team    If patient has an imaging disc, the injection MAY be scheduled but patient must bring disc to appt or appt will be cancelled.    Is patient able to transfer to a procedure table with minimal or no assistance? Yes   If no, do NOT schedule and route to RN Pool/ Dr. Lerner's Team    Procedure Specific Instructions:  If celiac plexus block, informed patient NPO for 6 hours and that it is okay to take medications with sips of water, especially blood pressure medications Not Applicable       If this is for a cervical procedure, informed patient that aspirin needs to be held for 6 days.   Not Applicable    Sedation, If Sedation is ordered for any procedure, patient must be NPO for 6 hours prior to procedure Not Applicable    If IV needed:  Do not schedule procedures requiring IV placement in the first appointment of the day or first appointment after lunch. Do NOT schedule at 0745, 0815 or 1245. N/A  Instructed patient to arrive 30 minutes early for IV start if required. (Check Procedure Scheduling Grid)  Not Applicable    Reminders:  If you are started on any steroids or antibiotics between now and your appointment, you must contact us because the procedure may need to be cancelled.  INFORMED     As a reminder, receiving steroids can decrease your body's ability to fight infection.   Would you still like to move forward with scheduling the injection?  Yes    IV Sedation is not provided for procedures. If oral anti-anxiety medication is needed, the patient should request this from their referring provider.    Instruct patient to arrive as directed prior to the scheduled appointment time:  If IV needed 30 minutes before appointment time     For patients 85 or older we recommend having an adult stay w/ them for the remainder of the day.     If the patient is Diabetic, remind them to bring their  glucometer.      Does the patient have any questions?  NO  Lisa Joyce  [facility]

## 2024-06-04 ENCOUNTER — TELEPHONE (OUTPATIENT)
Dept: PALLIATIVE MEDICINE | Facility: OTHER | Age: 34
End: 2024-06-04
Payer: COMMERCIAL

## 2024-06-04 NOTE — TELEPHONE ENCOUNTER
Preprocedure reminder call Lumbar CÉSAR    Patient call went to [facility]. No PCC on file. Left a brief message with the following information leaving no patient identifiers. Will send a [employer] message as well.    Arrival time 0745 am    Location: [facility] [address] [facility]    Do you have a ?     If patient doesn't have a  they will need to call and reschedule    Has the patient had a flu shot or any other vaccinations within the past 7 days?     If yes, explain that for the vaccine to work best they need to:              wait 1 week before and 1 week after getting any Vaccine           wait 1 week before and 2 weeks after getting any Covid Vaccine    If patient has concerns about the timing, send to RN pool    Have you had any oral steroids or antibiotics in the last five days?  (Oral steroid ok per Sanchez but note the patient is taking it or not)    If yes check with the nurse or provider. The procedure will most likely need to be rescheduled.    Its ok to eat and drink as usual and take your  BP and diabetes medications if this applies to you.  (note patient has been informed yes or no)      To call and reschedule or talk to the nurse about any questions you may have please dial    [phone]

## 2024-06-05 ENCOUNTER — RADIOLOGY INJECTION OFFICE VISIT (OUTPATIENT)
Dept: PALLIATIVE MEDICINE | Facility: OTHER | Age: 34
End: 2024-06-05
Attending: STUDENT IN AN ORGANIZED HEALTH CARE EDUCATION/TRAINING PROGRAM
Payer: COMMERCIAL

## 2024-06-05 VITALS — DIASTOLIC BLOOD PRESSURE: 83 MMHG | OXYGEN SATURATION: 96 % | HEART RATE: 79 BPM | SYSTOLIC BLOOD PRESSURE: 131 MMHG

## 2024-06-05 DIAGNOSIS — M54.16 LUMBAR RADICULOPATHY: Primary | ICD-10-CM

## 2024-06-05 PROCEDURE — 250N000009 HC RX 250: Performed by: STUDENT IN AN ORGANIZED HEALTH CARE EDUCATION/TRAINING PROGRAM

## 2024-06-05 PROCEDURE — 250N000011 HC RX IP 250 OP 636: Performed by: STUDENT IN AN ORGANIZED HEALTH CARE EDUCATION/TRAINING PROGRAM

## 2024-06-05 PROCEDURE — 64484 NJX AA&/STRD TFRM EPI L/S EA: CPT | Mod: LT | Performed by: STUDENT IN AN ORGANIZED HEALTH CARE EDUCATION/TRAINING PROGRAM

## 2024-06-05 PROCEDURE — 255N000002 HC RX 255 OP 636: Performed by: STUDENT IN AN ORGANIZED HEALTH CARE EDUCATION/TRAINING PROGRAM

## 2024-06-05 PROCEDURE — 64483 NJX AA&/STRD TFRM EPI L/S 1: CPT | Mod: LT | Performed by: STUDENT IN AN ORGANIZED HEALTH CARE EDUCATION/TRAINING PROGRAM

## 2024-06-05 RX ORDER — LIDOCAINE HYDROCHLORIDE 10 MG/ML
3 INJECTION, SOLUTION EPIDURAL; INFILTRATION; INTRACAUDAL; PERINEURAL ONCE
Status: COMPLETED | OUTPATIENT
Start: 2024-06-05 | End: 2024-06-05

## 2024-06-05 RX ORDER — DEXAMETHASONE SODIUM PHOSPHATE 10 MG/ML
10 INJECTION INTRAMUSCULAR; INTRAVENOUS ONCE
Status: COMPLETED | OUTPATIENT
Start: 2024-06-05 | End: 2024-06-05

## 2024-06-05 RX ADMIN — IOHEXOL 3 ML: 180 INJECTION INTRAVENOUS at 11:27

## 2024-06-05 RX ADMIN — LIDOCAINE HYDROCHLORIDE 3 ML: 10 INJECTION, SOLUTION EPIDURAL; INFILTRATION; INTRACAUDAL; PERINEURAL at 11:39

## 2024-06-05 RX ADMIN — DEXAMETHASONE SODIUM PHOSPHATE 10 MG: 10 INJECTION, SOLUTION INTRAMUSCULAR; INTRAVENOUS at 11:39

## 2024-06-05 ASSESSMENT — PAIN SCALES - GENERAL
PAINLEVEL: NO PAIN (0)
PAINLEVEL: SEVERE PAIN (7)

## 2024-06-05 NOTE — PATIENT INSTRUCTIONS
[facility] Procedure Discharge Instructions    Today you saw:   Dr. Sanchez    Your procedure:  lumbar epidural steroid injection       Medications used:    Lidocaine (anesthetic)    Omnipaque (contrast)           Be cautious when walking as numbness and/or weakness in the legs may occur up to 6-8 hours after the procedure due to effect of the local anesthetic  Do not drive for 6 hours. The effect of the local anesthetic could slow your reflexes.   Avoid strenuous activity for the first 24 hours. You may resume your regular activities after that.   You may shower, however avoid swimming, tub baths or hot tubs for 24 hours following your procedure  You may have a mild to moderate increase in pain for several days following the injection.    You may use ice packs for 10-15 minutes, 3 to 4 times a day at the injection site for comfort  Do not use heat to painful areas for 6 to 8 hours. This will give the local anesthetic time to wear off and prevent you from accidentally burning your skin.  Unless you have been directed to avoid the use of anti-inflammatory medications (NSAIDS-ibuprofen, Aleve, Motrin), you may use these medications or Tylenol for pain control if needed.   With diabetes, check your blood sugar more frequently than usual as your blood sugar may be higher than normal for 10-14 days following a steroid injection. Contact your doctor who manages your diabetes if your blood sugar is higher than usual  Possible side effects of steroids that you may experience include flushing, elevated blood pressure, increased appetite, mild headaches and restlessness.  All of these symptoms will get better with time.  It may take up to 14 days for the steroid medication to start working although you may feel the effect as early as a few days after the procedure.   Follow up with your referring provider in 2-3 weeks    If you experience any of the following, call the pain center line during work hours at  [phone] or on-call physician after hours at [phone]:  -Fever over 100 degree F  -Swelling, bleeding, redness, drainage, warmth at the injection site  -Progressive weakness or numbness in your legs or arms  -Loss of bowel or bladder function  -Unusual headache that is not relieved by Tylenol or your regular headache medication  -Unusual new onset of pain that is not improving

## 2024-06-05 NOTE — PROGRESS NOTES
Pre procedure Diagnosis: lumbar radiculopathy   Post procedure Diagnosis: Same  Procedure performed: left L5 and S1 transforaminal epidural steroid injection, CPT code 38625, 50821  Anesthesia: none  Complications: none immediately  Operators: Armaan Sanchez MD, Walt Mendez MD     Indications:   Rogelio Noel is a 34 year old male was sent for a lumbar transforaminal epidural steroid injection      Options/alternatives, benefits and risks were discussed with the patient including bleeding, infection, tissue trauma, numbness, weakness, paralysis, spinal cord injury, radiation exposure, headache and reaction to medications. Questions were answered to his satisfaction and he agrees to proceed. Voluntary informed consent was obtained and signed.     Vitals were reviewed: Yes  Allergies were reviewed:  Yes   Medications were reviewed:  Yes   Pre-procedure pain score: 7/10    Procedure:  After getting informed consent, patient was brought into the procedure suite and was placed in a prone position on the procedure table.   A Pause for the Cause was performed.  Patient was prepped and draped in sterile fashion.     After identifying the left L5-S1 and S1 neuroforamina, the C-arm was rotated to a left lateral oblique angle.  A total of 8ml of Lidocaine 1% was used to anesthetize the skin and the needle track at a skin entry site coaxial with the fluoroscopy beam, and overriding the superior aspect of the neuroforamen.  Two 22 gauge 5 inch spinal needles were advanced under intermittent fluoroscopy until they entered the foramen superiorly.    The position was then inspected from anteroposterior and lateral views, and the needles adjusted appropriately.  A total of 3ml of Omnipaque-180 was injected, confirming appropriate position, with spread into the nerve root sheath and the epidural space, with no intravascular uptake. Visualization of active injection under live fluoroscopy or digital subtraction angiography  or live fluoroscopy further confirmed the above appropriate contrast spread. 7mL Omnipaque was wasted.    A total of 3ml of preservative free 1% lidocaine with 10mg of dexamethasone was injected, split evenly among the above two sites.  The needles were flushed with lidocaine and removed.    During the procedure, the patient DID experience paresthesias corresponding to the nerve root near final needle placement.    Hemostasis was achieved, the area was cleaned, and bandaids were placed when appropriate.    The patient tolerated the procedure well, and was taken to the recovery room. Images were saved to PACS.    Post-procedure pain score: 0/10  Follow-up includes:   -f/u with referring provider    Procedure was performed with Dr. Sanchez who was present for all key portions of the procedure.      Walt Mendez MD  Pain Fellow Physician  [facility]     Physician Attestation   I, Armaan Sanchez MD, was present for all key and critical portions of the procedure, and I was immediately available during the remainder of the procedure.  Any changes to the documentation have been made above.    Armaan Sanchez MD  Interventional Pain Medicine  [facility]

## 2024-06-05 NOTE — NURSING NOTE
Pre-procedure Intake  If YES to any questions or NO to having a   Please complete laminated checklist and leave on the computer keyboard for Provider, verbally inform provider if able.    For SCS Trial, RFA's or any sedation procedure:  Have you been fasting? NA  If yes, for how long?     Are you taking any any blood thinners such as Coumadin, Warfarin, Jantoven, Pradaxa Xarelto, Eliquis, Edoxaban, Enoxaparin, Lovenox, Heparin, Arixtra, Fondaparinux, or Fragmin? OR Antiplatelet medication such as Plavix, Brilinta, or Effient?   No   If yes, when did you take your last dose?     Do you take aspirin?  No  If cervical procedure, have you held aspirin for 6 days?   NA    Do you have any allergies to contrast dye, iodine, steroid and/or numbing medications?  NO    Are you currently taking antibiotics or have an active infection?  NO    Have you had a fever/elevated temperature within the past week? NO    Are you currently taking oral steroids? NO    Do you have a ? Yes    Are you pregnant or breastfeeding?  NO    Have you received the COVID-19 vaccine? Yes  If yes, was it your 1st, 2nd or only dose needed? 1 dose   Date of most recent vaccine: 12/23/2021    Notify provider and RNs if systolic BP >170, diastolic BP >100, P >100 or O2 sats < 90%

## 2024-06-12 ENCOUNTER — MYC REFILL (OUTPATIENT)
Dept: FAMILY MEDICINE | Facility: CLINIC | Age: 34
End: 2024-06-12
Payer: COMMERCIAL

## 2024-06-12 ENCOUNTER — MYC MEDICAL ADVICE (OUTPATIENT)
Dept: FAMILY MEDICINE | Facility: CLINIC | Age: 34
End: 2024-06-12
Payer: COMMERCIAL

## 2024-06-12 DIAGNOSIS — I10 BENIGN ESSENTIAL HYPERTENSION: Primary | ICD-10-CM

## 2024-06-12 RX ORDER — LISINOPRIL 20 MG/1
20 TABLET ORAL DAILY
OUTPATIENT
Start: 2024-06-12

## 2024-06-12 RX ORDER — LISINOPRIL 20 MG/1
20 TABLET ORAL DAILY
Qty: 90 TABLET | Refills: 1 | Status: SHIPPED | OUTPATIENT
Start: 2024-06-12

## 2024-06-12 RX ORDER — LISINOPRIL 20 MG/1
20 TABLET ORAL DAILY
Status: CANCELLED | OUTPATIENT
Start: 2024-06-12

## 2024-06-12 NOTE — TELEPHONE ENCOUNTER
Rx for lisinopril was denied x 2 today for duplicate requests.  Please fill if ok.  Deidre DIXON RN

## 2024-06-12 NOTE — TELEPHONE ENCOUNTER
Pt is requesting refill via [employer] for Lisinopril 20mg  and they have been denied due to duplicates - pharmacy does not have anything active on file as the last prescription that had refills was cancelled via PPI. Script was 90 ds and pt is due for a fill.     Thank you,  Brynn Decker, Pharmacy Technician  [facility]    [phone]

## 2024-06-29 ENCOUNTER — HEALTH MAINTENANCE LETTER (OUTPATIENT)
Age: 34
End: 2024-06-29

## 2024-07-02 ENCOUNTER — MYC REFILL (OUTPATIENT)
Dept: PALLIATIVE MEDICINE | Facility: OTHER | Age: 34
End: 2024-07-02
Payer: COMMERCIAL

## 2024-07-02 DIAGNOSIS — M54.16 LUMBAR RADICULOPATHY: ICD-10-CM

## 2024-07-03 RX ORDER — GABAPENTIN 300 MG/1
300 CAPSULE ORAL 3 TIMES DAILY
Qty: 90 CAPSULE | Refills: 0 | Status: SHIPPED | OUTPATIENT
Start: 2024-07-03 | End: 2024-08-29

## 2024-07-03 NOTE — TELEPHONE ENCOUNTER
Refills have been requested for the following medications:         gabapentin (NEURONTIN) 300 MG capsule [Armaan Sanchez]      Patient Comment: Still having pain but is mostly in my left buttock now sincd the shot.      Preferred pharmacy: [facility], [place] - [address] [place] [address]

## 2024-07-03 NOTE — TELEPHONE ENCOUNTER
Received fax from pharmacy requesting refill(s) for  gabapentin (NEURONTIN) 300 MG capsule       Date last filled     Last Appt Date:5/9/24    Next Appt scheduled: 8/22/24    Pharmacy:  [facility], [place] - [address] [place] [address] for processing    Joanie RODRIGUEZ [facility] [place]

## 2024-08-22 ENCOUNTER — OFFICE VISIT (OUTPATIENT)
Dept: PALLIATIVE MEDICINE | Facility: OTHER | Age: 34
End: 2024-08-22
Payer: COMMERCIAL

## 2024-08-22 VITALS — OXYGEN SATURATION: 98 % | HEART RATE: 95 BPM | DIASTOLIC BLOOD PRESSURE: 81 MMHG | SYSTOLIC BLOOD PRESSURE: 141 MMHG

## 2024-08-22 DIAGNOSIS — M54.16 LUMBAR RADICULOPATHY: Primary | ICD-10-CM

## 2024-08-22 PROCEDURE — 99214 OFFICE O/P EST MOD 30 MIN: CPT | Performed by: STUDENT IN AN ORGANIZED HEALTH CARE EDUCATION/TRAINING PROGRAM

## 2024-08-22 ASSESSMENT — PAIN SCALES - GENERAL: PAINLEVEL: SEVERE PAIN (6)

## 2024-08-22 NOTE — PROGRESS NOTES
[facility] Interventional Follow-up    Date of visit: 8/22/2024    Chief complaint:   Chief Complaint   Patient presents with    Pain       Interval history:  Since his last visit, Rogelio Noel reports:  Gabapentin is helping, is concerned with feeling like he has to take it. Sometimes takes 2 capsules and it helps.    CÉSAR didn't help right away, took a few weeks to start. Some days no pain, some days pain is severe.      Pain scores:  Pain intensity currently is 6 on a scale of 0-10.     Current pain medications include:  Tylenol - helps but takes very infrequently  Methocarbamol - not helpful  Gabapentin 300mg TID - H     Past pain medications  Ibuprofen     Other treatments have included:  Rogelio RAFAL Sadie has been seen at a pain clinic in the past.  Dr. Clark  PT: doing yoga at home. Undergoing - HEP helped but completed now  Injections:    6/5/24 - L L5 and S1 TFESI - Helpful, ongoing  3/8/23 - L piriformis injection with me - helpful for a month  L L5 TFESI x3 most recently 9/27/22 all with Dr. Clark - 1st helped 1 year, 2nd helped less, 3rd was very difficult to tolerate and then got a few weeks of relief  Surgery: none  Alternative: none recently    Side Effects: no side effect    Medications:  Current Outpatient Medications   Medication Sig Dispense Refill    acetaminophen (TYLENOL) 500 MG tablet Take 1,000 mg by mouth every 6 hours as needed for mild pain      FLUoxetine (PROZAC WEEKLY) 90 MG DR capsule Take 1 mg by mouth once a week Unknown dose      gabapentin (NEURONTIN) 300 MG capsule Take 1 capsule (300 mg) by mouth 3 times daily Take 1 capsule at nighttime only for the first 7 days, and if tolerated well without dizziness or drowsiness, add the daytime doses for a total of 1 capsule 3 times daily 90 capsule 0    ibuprofen (ADVIL/MOTRIN) 400 MG tablet Take 800 mg by mouth every 6 hours as needed for moderate pain Usually takes two.       lisinopril (ZESTRIL) 20 MG tablet Take 1 tablet (20 mg) by mouth daily 90 tablet 1    LORazepam (ATIVAN) 1 MG tablet Take 1 tablet (1 mg) by mouth daily as needed for anxiety 20 tablet 0    methocarbamol (ROBAXIN) 500 MG tablet Take 500 mg by mouth 4 times daily as needed for muscle spasms         Medical History: any changes in medical history since they were last seen? No    Physical Exam:  Blood pressure (!) 141/81, pulse 95, SpO2 98%.  Constitutional: Well developed, NAD  Head: normocephalic. Atraumatic.   Eyes: no redness or jaundice noted   CV: warm, well perfused extremities   Skin: no suspicious lesions or rashes   Psychiatric: mentation appears normal and affect full  Focused MSK exam: nontender at left piriformis, ischial bursa, no pain to hamstring active resistance.    Diagnostics:  EXAM: MR LUMBAR SPINE W/O CONTRAST  LOCATION: [facility]  DATE: 5/24/2024     INDICATION: suspect worsening of lumbar radiculopathy S1 or L5 on left  COMPARISON: 4/7/2021.  TECHNIQUE: Routine Lumbar Spine MRI without IV contrast.     FINDINGS:   Nomenclature is based on 5 lumbar type vertebral bodies. Loss of the normal lumbar lordosis. Normal vertebral body heights. Multilevel Schmorl's nodes. No findings to suggest acute fracture. Marrow signal within normal limits. Normal distal spinal cord   and cauda equina with conus medullaris at L2. No extraspinal abnormality. Unremarkable visualized bony pelvis.     T12-L1: Disc bulge with dorsal annular fissure. Mild disc height loss with signal change. Mild facet arthropathy. No spinal canal or foraminal stenosis.      L1-L2: Slightly increased disc bulge with posterior osteophytic ridging. Mild disc height loss with signal change. Mild facet arthropathy. Moderate spinal canal stenosis with compression of the lateral recesses. Mild foraminal stenosis.     L2-L3: Similar asymmetric left disc bulge. Moderate disc height loss with signal change. Mild  foraminal stenosis. Moderate spinal canal stenosis with left greater than right lateral recess compression. Mild bilateral foraminal stenosis.      L3-L4: Slightly increased disc bulge. Moderate disc height loss with signal change. Moderate facet arthropathy. Severe spinal canal stenosis. Mild bilateral foraminal stenosis.     L4-L5: Slightly increased disc bulge. Mild disc height loss with signal change. Moderate facet arthropathy. Lateral recess narrowing. No central spinal canal stenosis. Mild left foraminal stenosis. No right foraminal stenosis.     L5-S1: Similar disc bulge with superimposed left central protrusion. Mild disc height loss with signal change. Mild facet arthropathy. Mild spinal canal stenosis with compression of the left lateral recess, involving the descending left S1 nerve root.   Mild left foraminal stenosis. No right foraminal stenosis.                                                                      IMPRESSION:  1.  Similar to slightly worsened degenerative changes throughout the lumbar spine with severe spinal canal stenosis at L3-L4. Additional details as above.    Personally reviewed: yes    Assessment:   1. Piriformis syndrome  2. Lumbar radiculopathy     Rogelio Noel is a 34 year old male presenting for follow-up of his left buttock pain with radiation down the left posterior thigh and calf.  More recently.  We have previously managed this interventionally with TFESI, piriformis muscle injections.  Both have been successful, but the TFESI at some point has helped for a full year, while some have helped at all.  We previously prescribed gabapentin which is helping, but the patient is taking this as needed rather than scheduled.  We discussed increasing the dosing to a scheduled 3 times a day.  We reviewed the MRI of the lumbar spine.  We previously performed a left L5 and S1 two-level TFESI.  Patient notes that this is making a significant difference, but there are days where the  pain is still severe.  We discussed repeating this injection, and I will have the patient see my partner Dr. Hebert for surgical evaluation given MRI findings of the L5-S1 disc abutting the traversing left S1 nerve.  Will see the patient back in 3 months.     Plan:  Physical Therapy:  Continue HEP  Diagnostic Studies:  Updated MRI Lumbar spine ordered  Medication Management:    Continue gabapentin 300mg TID - take scheduled  Further procedures recommended:   Left L5 and S1 TFESI ordered  Consider repeat left piriformis muscle injection - patient ok to call us if needed  Referral: to Dr. Hebert spine surgery for surgical evaluation  Follow up: 3 months      Armaan Sanchez MD  Interventional Pain Medicine  [facility]

## 2024-08-22 NOTE — PROGRESS NOTES
"Patient presents to the clinic today for a visit with Armaan Sanchez MD regarding Pain Management.          5/23/2023     8:16 AM 11/9/2023     4:09 PM 8/22/2024     2:30 PM   PEG Score   PEG Total Score 6.67 2 8.67       UDS/CSA-     Medications-     Notes Lethargic for two weeks after injections. It was nearly a month before he felt any relief from the injection. Over all there may have been some relief from the injection  but there are still really bad days.      did an injection that gave him about a year of relief.     Some days, sitting, standing and walking are all painful. Some stretches are too painful.     \"It's not consistent pain but the relief also isn't consistent despite everyone's best efforts.\"    Lisa Delong  [facility] Clinical Assistant  "

## 2024-08-22 NOTE — PATIENT INSTRUCTIONS
Physical Therapy:  Continue HEP  Diagnostic Studies:  Updated MRI Lumbar spine ordered  Medication Management:    Continue gabapentin 300mg TID - take scheduled  Further procedures recommended:   Left L5 and S1 TFESI ordered  Consider repeat left piriformis muscle injection - patient ok to call us if needed  Referral: to Dr. Hebert spine surgery  Follow up: 3 months      Armaan Sanchez MD  Interventional Pain Medicine  [facility]

## 2024-08-27 ENCOUNTER — TELEPHONE (OUTPATIENT)
Dept: PALLIATIVE MEDICINE | Facility: OTHER | Age: 34
End: 2024-08-27
Payer: COMMERCIAL

## 2024-08-27 DIAGNOSIS — M54.16 LUMBAR RADICULOPATHY: Primary | ICD-10-CM

## 2024-08-27 NOTE — TELEPHONE ENCOUNTER
"Screening Questions for Radiology Injections:    Injection to be done at which interventional clinic site? [facility]    If choosing [facility] for location, please inform patient:  \"[facility] is a Hospital based clinic. Before your visit, you should check with your insurance about how it covers the charges for facility services in a hospital-based clinic.     Procedure ordered by Dr. Sanchez     Procedure ordered? Repeat left L5 and S1 two level TFESI   Transforaminal Cervical CÉSAR - Send to [facility] ([facility]) - No [facility] providers perform this procedure    What insurance would patient like us to bill for this procedure? [employer]  IF SCHEDULING IN [place] PAIN OR SPINE PLEASE SCHEDULE AT LEAST 7-10 BUSINESS DAYS OUT SO A PA CAN BE OBTAINED  Worker's comp or MVA (motor vehicle accident) -Any injection DO NOT SCHEDULE and route to Brian Sneed.    [place] insurance - For SI joint injections, DO NOT SCHEDULE and route to Radha Richardson.     ALL BCBS, [employer] and [employer] - DO NOT SCHEDULE and route to Radha Richardson  MEDICA- ALL INJECTIONS- route to Radha Richardson    Is patient scheduled at [facility]? NO   If YES, route every encounter to [facility] SPINE CENTER CARE NAVIGATION POOL [[phone]]    Is an  needed? No     Patient has a  home? (Review Grid) YES: Informed     Any chance of pregnancy? Not Applicable   If YES, do NOT schedule and route to RN pool  - Dr. Lerner route to Susy Hoffman and PM&R Nurse  [[id number]]      Is patient actively being treated for cancer or immunocompromised? No  If YES, do NOT schedule and route to RN pool/ Dr. Lerner's Team    Does the patient have a bleeding or clotting disorder? No   If YES, okay to schedule AND route to RN nurse / Dr. Lerner's Team   (For any patients with platelet count <100, RN must forward to provider)    Is patient taking any Blood Thinners OR Antiplatelet medication?  No   If hold needed, do NOT schedule, route to RN " paul/ Dr. Lerner's Team  Examples:   Blood Thinners: (Coumadin, Warfarin, Jantoven, Pradaxa, Xarelto, Eliquis, Edoxaban, Enoxaparin, Lovenox, Heparin, Arixtra, Fondaparinux or Fragmin)  Antiplatelet Medications: (Plavix, Brilinta or Effient)     Is patient taking any aspirin products (includes Excedrin and Fiorinal)? No   If yes route to RN pool/ Dr. Lerner's Team - Do not schedule    Is patient taking any GLP-1 Antagonist (hold needed for sedation patients only) No   (semaglutide (Ozempic, Wegovy), dulaglutide (Trulicity), exenatide ER (Bydureon), tirzepatide (Mounjaro), Liraglutide (Saxenda, Victoza), semaglutide (Rybelsus), Terzepatide (Zepbound)  If YES, okay to schedule AND route to RN nurse / Dr. Lerner's Team      Any allergies to contrast dye, iodine, shellfish, or numbing and steroid medications? No  If YES, schedule and add allergy information to appointment notes AND route to the RN pool/ Dr. Lerner's Team  If CÉSAR and Contrast Dye / Iodine Allergy? DO NOT SCHEDULE, route to RN pool/ Dr. Lerner's Team  Allergies: Patient has no known allergies.     Does patient have an active infection or treated for one within the past week? No  Is patient currently taking any antibiotics or steroid medications?  No   For patients on chronic, preventative, or prophylactic antibiotics, procedures may be scheduled.   For patients on antibiotics for active or recent infection, schedule 4 days after completed.  For patients on steroid medications, schedule 4 days after completed.     Has the patient had a flu shot or any other vaccinations within the past 7 days? No  If yes, explain that for the vaccine to work best they need to:     wait 1 week before and 1 week after getting any Vaccine  wait 1 week before and 2 weeks after getting any Covid Vaccine   If patient has concerns about the timing, send to RN pool/ Dr. Lerner's Team    Does patient have an MRI/CT?  YES: 05/24/24 Include Date and Check Procedure Scheduling Grid to  see if required.  Was the MRI/CT done within the last 3 years?  Yes   If no route to RN Pool/ Dr. Lerner's Team  If yes, where was the MRI/CT done? [facility]   Refer to PACS Transmissions list for approved external locations and route to RN Pool High Priority/ Dr. Khan Team  If MRI was not done at approved external location do NOT schedule and route to RN pool/ Dr. Serranos Team    If patient has an imaging disc, the injection MAY be scheduled but patient must bring disc to appt or appt will be cancelled.    Is patient able to transfer to a procedure table with minimal or no assistance? Yes   If no, do NOT schedule and route to RN Pool/ Dr. Lerner's Team    Procedure Specific Instructions:  If celiac plexus block, informed patient NPO for 6 hours and that it is okay to take medications with sips of water, especially blood pressure medications Not Applicable       If this is for a cervical procedure, informed patient that aspirin needs to be held for 6 days.   Not Applicable    Sedation, If Sedation is ordered for any procedure, patient must be NPO for 6 hours prior to procedure Not Applicable    If IV needed:  Do not schedule procedures requiring IV placement in the first appointment of the day or first appointment after lunch. Do NOT schedule at 0745, 0815 or 1245.     Instructed patient to arrive 30 minutes early for IV start if required. (Check Procedure Scheduling Grid)  Not Applicable    Reminders:  If you are started on any steroids or antibiotics between now and your appointment, you must contact us because the procedure may need to be cancelled.  Yes    As a reminder, receiving steroids can decrease your body's ability to fight infection.   Would you still like to move forward with scheduling the injection?  Yes    IV Sedation is not provided for procedures. If oral anti-anxiety medication is needed, the patient should request this from their referring provider.    Instruct patient to arrive as directed  prior to the scheduled appointment time:  If IV needed 30 minutes before appointment time     For patients 85 or older we recommend having an adult stay w/ them for the remainder of the day.     If the patient is Diabetic, remind them to bring their glucometer.      Does the patient have any questions?  NO  Alana Maharaj  [facility]

## 2024-08-29 ENCOUNTER — MYC REFILL (OUTPATIENT)
Dept: PALLIATIVE MEDICINE | Facility: OTHER | Age: 34
End: 2024-08-29
Payer: COMMERCIAL

## 2024-08-29 DIAGNOSIS — M54.16 LUMBAR RADICULOPATHY: ICD-10-CM

## 2024-08-29 NOTE — TELEPHONE ENCOUNTER
Received fax from pharmacy requesting refill(s) for gabapentin (NEURONTIN) 300 MG capsule     Date last filled 07/03/2024    Last Appt Date:08/22/2024    Next Appt scheduled: 11/26/2024    Pharmacy:   [facility], [place] - [address] [place] [address] route for processing    Susy Mckeon MA  [facility] [place]

## 2024-08-30 RX ORDER — GABAPENTIN 300 MG/1
300 CAPSULE ORAL 3 TIMES DAILY
Qty: 90 CAPSULE | Refills: 0 | Status: SHIPPED | OUTPATIENT
Start: 2024-08-30 | End: 2024-09-26

## 2024-09-26 ENCOUNTER — MYC REFILL (OUTPATIENT)
Dept: PALLIATIVE MEDICINE | Facility: OTHER | Age: 34
End: 2024-09-26
Payer: COMMERCIAL

## 2024-09-26 DIAGNOSIS — M54.16 LUMBAR RADICULOPATHY: ICD-10-CM

## 2024-09-27 NOTE — TELEPHONE ENCOUNTER
Received fax request from pharmacy requesting refill(s) for gabapentin (NEURONTIN) 300 MG capsule    Last refilled on 8/30/2024.    Pt last seen on 8/22/2024.  Next appt scheduled for 10/2/2024.    Will route to nursing to review titration dosage.

## 2024-09-27 NOTE — TELEPHONE ENCOUNTER
sue DASH Pain Nurse16 hours ago (4:38 PM)       Refills have been requested for the following medications:         gabapentin (NEURONTIN) 300 MG capsule [Armaan Sanchez]     Preferred pharmacy: [facility] [address] [place] [address]

## 2024-09-27 NOTE — TELEPHONE ENCOUNTER
My-chart message sent to patient to review how he is currently taking this medication  Awaiting response from patient    Call placed to patient: same message left on patient VM.  Awaiting a call back

## 2024-10-01 ENCOUNTER — TELEPHONE (OUTPATIENT)
Dept: PALLIATIVE MEDICINE | Facility: OTHER | Age: 34
End: 2024-10-01
Payer: COMMERCIAL

## 2024-10-01 RX ORDER — GABAPENTIN 300 MG/1
300 CAPSULE ORAL 3 TIMES DAILY
Qty: 270 CAPSULE | Refills: 0 | Status: SHIPPED | OUTPATIENT
Start: 2024-10-01

## 2024-10-02 ENCOUNTER — RADIOLOGY INJECTION OFFICE VISIT (OUTPATIENT)
Dept: PALLIATIVE MEDICINE | Facility: OTHER | Age: 34
End: 2024-10-02
Attending: STUDENT IN AN ORGANIZED HEALTH CARE EDUCATION/TRAINING PROGRAM
Payer: COMMERCIAL

## 2024-10-02 VITALS — HEART RATE: 86 BPM | DIASTOLIC BLOOD PRESSURE: 88 MMHG | SYSTOLIC BLOOD PRESSURE: 139 MMHG | OXYGEN SATURATION: 98 %

## 2024-10-02 DIAGNOSIS — M54.16 LUMBAR RADICULOPATHY: ICD-10-CM

## 2024-10-02 PROCEDURE — 64483 NJX AA&/STRD TFRM EPI L/S 1: CPT | Mod: LT | Performed by: STUDENT IN AN ORGANIZED HEALTH CARE EDUCATION/TRAINING PROGRAM

## 2024-10-02 PROCEDURE — 250N000011 HC RX IP 250 OP 636: Performed by: STUDENT IN AN ORGANIZED HEALTH CARE EDUCATION/TRAINING PROGRAM

## 2024-10-02 PROCEDURE — 255N000002 HC RX 255 OP 636: Performed by: STUDENT IN AN ORGANIZED HEALTH CARE EDUCATION/TRAINING PROGRAM

## 2024-10-02 PROCEDURE — 64484 NJX AA&/STRD TFRM EPI L/S EA: CPT | Mod: LT | Performed by: STUDENT IN AN ORGANIZED HEALTH CARE EDUCATION/TRAINING PROGRAM

## 2024-10-02 PROCEDURE — 250N000009 HC RX 250: Performed by: STUDENT IN AN ORGANIZED HEALTH CARE EDUCATION/TRAINING PROGRAM

## 2024-10-02 RX ORDER — LIDOCAINE HYDROCHLORIDE 10 MG/ML
3 INJECTION, SOLUTION EPIDURAL; INFILTRATION; INTRACAUDAL; PERINEURAL ONCE
Status: COMPLETED | OUTPATIENT
Start: 2024-10-02 | End: 2024-10-02

## 2024-10-02 RX ORDER — DEXAMETHASONE SODIUM PHOSPHATE 10 MG/ML
10 INJECTION INTRAMUSCULAR; INTRAVENOUS ONCE
Status: COMPLETED | OUTPATIENT
Start: 2024-10-02 | End: 2024-10-02

## 2024-10-02 RX ADMIN — IOHEXOL 10 ML: 180 INJECTION INTRAVENOUS at 13:28

## 2024-10-02 RX ADMIN — LIDOCAINE HYDROCHLORIDE 3 ML: 10 INJECTION, SOLUTION EPIDURAL; INFILTRATION; INTRACAUDAL; PERINEURAL at 15:57

## 2024-10-02 RX ADMIN — DEXAMETHASONE SODIUM PHOSPHATE 10 MG: 10 INJECTION, SOLUTION INTRAMUSCULAR; INTRAVENOUS at 15:57

## 2024-10-02 ASSESSMENT — PAIN SCALES - GENERAL
PAINLEVEL: NO PAIN (0)
PAINLEVEL: SEVERE PAIN (7)

## 2024-10-02 NOTE — NURSING NOTE
Pre-procedure Intake  If YES to any questions or NO to having a   Please complete laminated checklist and leave on the computer keyboard for Provider, verbally inform provider if able.    For SCS Trial, RFA's or any sedation procedure:  Have you been fasting? NA  If yes, for how long?     Are you taking any any blood thinners such as Coumadin, Warfarin, Jantoven, Pradaxa Xarelto, Eliquis, Edoxaban, Enoxaparin, Lovenox, Heparin, Arixtra, Fondaparinux, or Fragmin? OR Antiplatelet medication such as Plavix, Brilinta, or Effient?   No   If yes, when did you take your last dose?     Do you take aspirin?  Yes -   ASA  If cervical procedure, have you held aspirin for 6 days?   NA    Is the Pt taking any GLP-1 Antagonist (hold needed for sedation patients only)  (semaglutide (Ozempic, Wegovy), dulaglutide (Trulicity), exenatide ER (Bydureon), tirzepatide (Mounjaro), Liraglutide (Saxenda, Victoza), semaglutide (Rybelsus)     NA  If yes, when did you take your last dose?     Do you have any allergies to contrast dye, iodine, steroid and/or numbing medications?  NO    Are you currently taking antibiotics or have an active infection?  NO    Have you had a fever/elevated temperature within the past week? NO    Are you currently taking oral steroids? NO    Do you have a ? Yes    Are you pregnant or breastfeeding?  Not Applicable    Have you received any vaccinations in the last week? NO    Notify provider and RNs if systolic BP >170, diastolic BP >100, P >100 or O2 sats < 90%

## 2024-10-02 NOTE — PATIENT INSTRUCTIONS
[facility] Procedure Discharge Instructions    Today you saw:   Dr. Sanchez    Your procedure:  trans foraminal epidural steroid injection       Medications used:  Lidocaine (anesthetic)  Dexamethasone (steroid) Omnipaque (contrast)             Be cautious when walking as numbness and/or weakness in the legs may occur up to 6-8 hours after the procedure due to effect of the local anesthetic  Do not drive for 6 hours. The effect of the local anesthetic could slow your reflexes.   Avoid strenuous activity for the first 24 hours. You may resume your regular activities after that.   You may shower, however avoid swimming, tub baths or hot tubs for 24 hours following your procedure  You may have a mild to moderate increase in pain for several days following the injection.    You may use ice packs for 10-15 minutes, 3 to 4 times a day at the injection site for comfort  Do not use heat to painful areas for 6 to 8 hours. This will give the local anesthetic time to wear off and prevent you from accidentally burning your skin.  Unless you have been directed to avoid the use of anti-inflammatory medications (NSAIDS-ibuprofen, Aleve, Motrin), you may use these medications or Tylenol for pain control if needed.   With diabetes, check your blood sugar more frequently than usual as your blood sugar may be higher than normal for 10-14 days following a steroid injection. Contact your doctor who manages your diabetes if your blood sugar is higher than usual  Possible side effects of steroids that you may experience include flushing, elevated blood pressure, increased appetite, mild headaches and restlessness.  All of these symptoms will get better with time.  It may take up to 14 days for the steroid medication to start working although you may feel the effect as early as a few days after the procedure.   Follow up with your referring provider in 2-3 weeks    If you experience any of the following, call the pain  center line during work hours at [phone] or on-call physician after hours at [phone]:  -Fever over 100 degree F  -Swelling, bleeding, redness, drainage, warmth at the injection site  -Progressive weakness or numbness in your legs or arms  -Loss of bowel or bladder function  -Unusual headache that is not relieved by Tylenol or your regular headache medication  -Unusual new onset of pain that is not improving

## 2024-10-02 NOTE — NURSING NOTE
Discharge Information    Discharge Criteria = When patient returns to baseline or as per MD order    Consciousness:  Pt is fully awake    Circulation:  BP +/- 20% of pre-procedure level    Respiration:  Patient is able to breathe deeply    O2 Sat:  Patient is able to maintain O2 Sat >92% on room air    Activity:  Moves 4 extremities on command    Ambulation:  Patient is able to stand and walk or stand and pivot into wheelchair    Dressing:  Clean/dry or No Dressing    Notes:   Discharge instructions and AVS given to patient    Patient meets criteria for discharge?  YES    Admitted to PCU?  No    Responsible adult present to accompany patient home?  Yes    Signature/Title:    KIZZY WOOD RN  RN Care Coordinator  [facility] [place]

## 2024-10-18 DIAGNOSIS — M54.16 LUMBAR RADICULOPATHY: Primary | ICD-10-CM

## 2024-10-18 NOTE — TELEPHONE ENCOUNTER
Injection completed.    Closed encounter.      Gris ESPINOZA    [facility] [place]

## 2024-10-21 ENCOUNTER — ANCILLARY PROCEDURE (OUTPATIENT)
Dept: GENERAL RADIOLOGY | Facility: CLINIC | Age: 34
End: 2024-10-21
Attending: ORTHOPAEDIC SURGERY
Payer: COMMERCIAL

## 2024-10-21 ENCOUNTER — OFFICE VISIT (OUTPATIENT)
Dept: ORTHOPEDICS | Facility: CLINIC | Age: 34
End: 2024-10-21
Attending: STUDENT IN AN ORGANIZED HEALTH CARE EDUCATION/TRAINING PROGRAM
Payer: COMMERCIAL

## 2024-10-21 DIAGNOSIS — M54.16 LUMBAR RADICULOPATHY: ICD-10-CM

## 2024-10-21 DIAGNOSIS — M47.27 OSTEOARTHRITIS OF SPINE WITH RADICULOPATHY, LUMBOSACRAL REGION: Primary | ICD-10-CM

## 2024-10-21 DIAGNOSIS — M51.16 LUMBAR DISC HERNIATION WITH RADICULOPATHY: ICD-10-CM

## 2024-10-21 PROCEDURE — 99204 OFFICE O/P NEW MOD 45 MIN: CPT | Performed by: ORTHOPAEDIC SURGERY

## 2024-10-21 PROCEDURE — 72110 X-RAY EXAM L-2 SPINE 4/>VWS: CPT | Mod: TC | Performed by: RADIOLOGY

## 2024-10-21 RX ORDER — ACETAMINOPHEN 325 MG/1
975 TABLET ORAL ONCE
Status: CANCELLED | OUTPATIENT
Start: 2024-10-21 | End: 2024-10-21

## 2024-10-21 RX ORDER — CELECOXIB 100 MG/1
400 CAPSULE ORAL ONCE
Status: CANCELLED | OUTPATIENT
Start: 2024-10-21 | End: 2024-10-21

## 2024-10-21 NOTE — NURSING NOTE
Reason For Visit:   Chief Complaint   Patient presents with    Lower Back - Lumbar/SI    Consult     somewhat responsive to gabapentin, equivocal to   TFESI          Primary MD: Mitesh Bai  Ref. MD: Armaan Sanchez MD    ?  No  Occupation [employer].  Currently working? Yes.  Work status?  Full time.    Smoker: No    There were no vitals taken for this visit.    Pain Assessment  Patient Currently in Pain: No    Oswestry (FLETCHER) Questionnaire        10/21/2024     8:02 AM   OSWESTRY DISABILITY INDEX   Count 9   Sum 16   Oswestry Score (%) 35.56 %            Neck Disability Index (NDI) Questionnaire         No data to display                       Visual Analog Pain Scale  Back Pain Scale 0-10: 5  Right leg pain: 0  Left leg pain: 5  Neck Pain Scale 0-10: 0  Right arm pain: 0  Left arm pain: 0    Promis 10 Assessment         No data to display                         Jessica Montiel, CMA

## 2024-10-21 NOTE — LETTER
October 22, 2024    RE:  Rogelio Noel                              [id number] [facility] [address]            To whom it may concern:    Rogelio Noel is under my professional care for    Lumbar radiculopathy  Osteoarthritis of spine with radiculopathy, lumbosacral region  Lumbar disc herniation with radiculopathy. He may return to work with the following: The employee is having spine surgery on 10/29/2024 and is able to return to work on 11/06/2024 afterward.    When the patient returns to work, the following restrictions apply until 12/16/2024:No excessive bending, twisting, or lifting greater than 10 pounds. Patient is able to return to work sooner if pain is tolerable and he is not on sedative type medication (e.g. muscle relaxer, pain medication). Patient should be allowed to stand for 10 minutes every hours, and take a break for pain relief every hour while working.     If you have questions please contact my office at [phone].      Sincerely,      Electronically signed by   Onel Hebert MD

## 2024-10-21 NOTE — LETTER
10/21/2024      Rogelio Noel  [id number] [facility] [address]      Dear Colleague,    Thank you for referring your patient, Rogelio Noel, to the [facility]. Please see a copy of my visit note below.    Chief concern: Lumosacral radiculopathy     History of present illness:   Rogelio Noel is a very pleasant 34 year old male with four year history of back pain and severe radiculopathy. Pain starts near tail bone and radiates down left leg posteriorly.  Symptoms exacerbated by sitting.  Alleviated somewhat by standing. He was able to get standing desk at work which has been somewhat helpful.      Has had many injections. First one with Dr. Oh 4 years ago didn't help. Then had a second injection that helped for a year.  More recently has been having treatment from Dr. Sanchez.  Underwent left L5 and S1 transforaminal epidural steroid injection with Dr. Sanchez on 10/2 provided approximately 70% symptom relief.  Injection that preceded that 1 was not very helpful.  He notes the repeat injections are getting more painful.  His wife has to help him put his socks on in the morning. Was referred to physical therapy who eventually discharged him without substantial relief of his symptoms.     Taking gabapentin 300 mg three times daily on examination today patient alert and oriented no acute distress      Has mild tenderness to palpation left lower lumbosacral junction which does radiate caudally  Resisted strength testing notable for 4/5 strength with left ankle great toe dorsiflexion, otherwise 5/5 hip flexion, knee extension, ankle dorsiflexion great toe extension and plantarflexion  Sensation intact to gliding light touch L3-S1  Markedly positive straight leg raise, positive cross straight leg raise as well 2+ patella tendon Achilles tendon reflexes    4 views lumbar spine obtained today show multilevel spondylosis with marginal osteophytes throughout the lumbar spine.  No  clinically relevant spondylolisthesis.     Massive L5-S1 disc herniation with clear compression of traversing S1 nerve root the lateral recess.  Also appears that there is a dorsally projecting osteophytic spur off the superior endplate of S1 which further contributes to lateral recess stenosis on the left also multilevel disc osteophyte complex including at L2-3 L3-4 L4-5          Axial view at L5-S1 shows clear compression of the traversing S1 nerve root in the lateral recess due to disc herniation and osteophytic bone spur      Impression plan:  34-year-old male with 4-year history of severe lumbar radiculopathy in the setting of chronic disc herniation L5-S1.  He has had positive diagnostic and therapeutic response to epidural steroid injections in the past however these no longer provide lasting relief and the actual injection procedures have become very painful for him.    He has had appropriate maximization of nonoperative treatments and continues to have debilitating pain.  Given location of the pathology and corresponding S1 radicular symptoms recommended microdiscectomy left side L5-S1 to relieve neural compression.  Very high likelihood that disc herniation it has become quite calcified and may require more resection of osteophyte then removal of soft disc herniation.  Counseled the patient on this including the risks of the procedure such as recurrent disc herniation which 10 to 14% literature about but likely somewhat higher in his case.  Also could have incomplete pain relief due to prolonged nerve root compression.  May have recurrent symptoms in the future and ultimately need to undergo a fusion procedure at some point.  Patient was understanding these complications and risks and elected proceed with scheduling.    Time spent on this clinical encounter including previsit chart review, history and physical examination, patient counseling and documentation was 30 minutes on the date of  encounter.    Onel Hebert MD  , Department of Orthopedic Surgery  [facility]      Again, thank you for allowing me to participate in the care of your patient.        Sincerely,        Onel Hebert MD

## 2024-10-21 NOTE — PROGRESS NOTES
Chief concern: Lumosacral radiculopathy     History of present illness:   Rogelio Noel is a very pleasant 34 year old male with four year history of back pain and severe radiculopathy. Pain starts near tail bone and radiates down left leg posteriorly.  Symptoms exacerbated by sitting.  Alleviated somewhat by standing. He was able to get standing desk at work which has been somewhat helpful.      Has had many injections. First one with Dr. Oh 4 years ago didn't help. Then had a second injection that helped for a year.  More recently has been having treatment from Dr. Sanchez.  Underwent left L5 and S1 transforaminal epidural steroid injection with Dr. Sanchez on 10/2 provided approximately 70% symptom relief.  Injection that preceded that 1 was not very helpful.  He notes the repeat injections are getting more painful.  His wife has to help him put his socks on in the morning. Was referred to physical therapy who eventually discharged him without substantial relief of his symptoms.     Taking gabapentin 300 mg three times daily on examination today patient alert and oriented no acute distress      Has mild tenderness to palpation left lower lumbosacral junction which does radiate caudally  Resisted strength testing notable for 4/5 strength with left ankle great toe dorsiflexion, otherwise 5/5 hip flexion, knee extension, ankle dorsiflexion great toe extension and plantarflexion  Sensation intact to gliding light touch L3-S1  Markedly positive straight leg raise, positive cross straight leg raise as well 2+ patella tendon Achilles tendon reflexes    4 views lumbar spine obtained today show multilevel spondylosis with marginal osteophytes throughout the lumbar spine.  No clinically relevant spondylolisthesis.     Massive L5-S1 disc herniation with clear compression of traversing S1 nerve root the lateral recess.  Also appears that there is a dorsally projecting osteophytic spur off the superior endplate of S1  which further contributes to lateral recess stenosis on the left also multilevel disc osteophyte complex including at L2-3 L3-4 L4-5          Axial view at L5-S1 shows clear compression of the traversing S1 nerve root in the lateral recess due to disc herniation and osteophytic bone spur      Impression plan:  34-year-old male with 4-year history of severe lumbar radiculopathy in the setting of chronic disc herniation L5-S1.  He has had positive diagnostic and therapeutic response to epidural steroid injections in the past however these no longer provide lasting relief and the actual injection procedures have become very painful for him.    He has had appropriate maximization of nonoperative treatments and continues to have debilitating pain.  Given location of the pathology and corresponding S1 radicular symptoms recommended microdiscectomy left side L5-S1 to relieve neural compression.  Very high likelihood that disc herniation it has become quite calcified and may require more resection of osteophyte then removal of soft disc herniation.  Counseled the patient on this including the risks of the procedure such as recurrent disc herniation which 10 to 14% literature about but likely somewhat higher in his case.  Also could have incomplete pain relief due to prolonged nerve root compression.  May have recurrent symptoms in the future and ultimately need to undergo a fusion procedure at some point.  Patient was understanding these complications and risks and elected proceed with scheduling.    Time spent on this clinical encounter including previsit chart review, history and physical examination, patient counseling and documentation was 30 minutes on the date of encounter.    Onel Hebert MD  , Department of Orthopedic Surgery  [facility]

## 2024-10-22 ENCOUNTER — TELEPHONE (OUTPATIENT)
Dept: ORTHOPEDICS | Facility: CLINIC | Age: 34
End: 2024-10-22
Payer: COMMERCIAL

## 2024-10-22 DIAGNOSIS — M54.16 LUMBAR RADICULOPATHY: Primary | ICD-10-CM

## 2024-10-22 NOTE — LETTER
October 22, 2024    RE:  Rogelio Noel                              [id number] [facility] [address]            To whom it may concern:    Rogelio Noel is under my professional care for [employer]. He may return to work with the following: The employee is having spine surgery on 11/07/24 and is able to return to work on 11/25/2024 afterward.    When the patient returns to work, the following restrictions apply until 12/16/2024:No excessive bending, twisting, or lifting greater than 10 pounds. Patient is able to return to work sooner if pain is tolerable and he is not on sedative type medication (e.g. muscle relaxer, pain medication). Patient should be allowed to stand for 10 minutes every hours, and take a break for pain relief every hour while working.     If you have questions please contact my office at [phone].      Sincerely,      Electronically signed by   Onel Hebert MD

## 2024-10-22 NOTE — TELEPHONE ENCOUNTER
Patient is scheduled for surgery with Dr. Hebert    Spoke with: patient    Date of Surgery: 11/7/24    Location: [place]    Post op: 12/16/24 [facility]    Pre op with Provider complete    H&P: Scheduled with PAC 11/4/24    Additional imaging/appointments: N/A    Surgery packet: received     Additional comments: patient declined 10/29/24        Argelia Regan CMA on 10/22/2024 at 10:56 AM

## 2024-10-23 NOTE — LETTER
October 22, 2024    RE:  Rogelio Noel                              [id number] [address] [facility] [address]            To whom it may concern:    Rogelio Noel is under my professional care for lumbar radiculopathy. He may return to work with the following: The employee is having spine surgery on 11/07/2024 and is able to return to work on 11/25/2024 afterward.    When the patient returns to work, the following restrictions apply until 12/16/2024:No excessive bending, twisting, or lifting greater than 10 pounds. Patient is able to return to work sooner if pain is tolerable and he is not on sedative type medication (e.g. muscle relaxer, pain medication). Patient should be allowed to stand for 10 minutes every hours, and take a break for pain relief every hour while working.     If you have questions please contact my office at [phone].      Sincerely,      Electronically signed by   Onel Hebert MD     Pt came into office requesting a referral for Prostate. Please advise?

## 2024-10-23 NOTE — TELEPHONE ENCOUNTER
FUTURE VISIT INFORMATION      SURGERY INFORMATION:  Date: 24  Location: [facility]  Surgeon:  Onel Hebert MD   Anesthesia Type:  general  Procedure: LEFT Lumbar 5-Sacral 1 MICRODISCECTOMY, SPINE, LUMBAR, 1 LEVEL, POSTERIOR APPROACH   Consult: ov 10/21/24    RECORDS REQUESTED FROM:       Primary Care Provider: Mitesh Bai MD - [facility]    Most recent EKG+ Tracin23

## 2024-11-04 ENCOUNTER — ANESTHESIA EVENT (OUTPATIENT)
Dept: SURGERY | Facility: CLINIC | Age: 34
DRG: 519 | End: 2024-11-04
Payer: COMMERCIAL

## 2024-11-04 ENCOUNTER — LAB (OUTPATIENT)
Dept: LAB | Facility: CLINIC | Age: 34
End: 2024-11-04
Payer: COMMERCIAL

## 2024-11-04 ENCOUNTER — VIRTUAL VISIT (OUTPATIENT)
Dept: SURGERY | Facility: CLINIC | Age: 34
End: 2024-11-04
Payer: COMMERCIAL

## 2024-11-04 ENCOUNTER — PRE VISIT (OUTPATIENT)
Dept: SURGERY | Facility: CLINIC | Age: 34
End: 2024-11-04

## 2024-11-04 VITALS — WEIGHT: 295 LBS | HEIGHT: 75 IN | BODY MASS INDEX: 36.68 KG/M2

## 2024-11-04 DIAGNOSIS — Z01.818 PRE-OP EVALUATION: Primary | ICD-10-CM

## 2024-11-04 DIAGNOSIS — Z01.818 PRE-OP EVALUATION: ICD-10-CM

## 2024-11-04 LAB
ANION GAP SERPL CALCULATED.3IONS-SCNC: 13 MMOL/L (ref 7–15)
BUN SERPL-MCNC: 25.6 MG/DL (ref 6–20)
CALCIUM SERPL-MCNC: 9.5 MG/DL (ref 8.8–10.4)
CHLORIDE SERPL-SCNC: 102 MMOL/L (ref 98–107)
CREAT SERPL-MCNC: 0.98 MG/DL (ref 0.67–1.17)
EGFRCR SERPLBLD CKD-EPI 2021: >90 ML/MIN/1.73M2
ERYTHROCYTE [DISTWIDTH] IN BLOOD BY AUTOMATED COUNT: 11.2 % (ref 10–15)
GLUCOSE SERPL-MCNC: 114 MG/DL (ref 70–99)
HCO3 SERPL-SCNC: 24 MMOL/L (ref 22–29)
HCT VFR BLD AUTO: 46.2 % (ref 40–53)
HGB BLD-MCNC: 15.5 G/DL (ref 13.3–17.7)
MCH RBC QN AUTO: 30 PG (ref 26.5–33)
MCHC RBC AUTO-ENTMCNC: 33.5 G/DL (ref 31.5–36.5)
MCV RBC AUTO: 89 FL (ref 78–100)
PLATELET # BLD AUTO: 203 10E3/UL (ref 150–450)
POTASSIUM SERPL-SCNC: 4.3 MMOL/L (ref 3.4–5.3)
RBC # BLD AUTO: 5.17 10E6/UL (ref 4.4–5.9)
SODIUM SERPL-SCNC: 139 MMOL/L (ref 135–145)
WBC # BLD AUTO: 6.4 10E3/UL (ref 4–11)

## 2024-11-04 PROCEDURE — 80048 BASIC METABOLIC PNL TOTAL CA: CPT

## 2024-11-04 PROCEDURE — 36415 COLL VENOUS BLD VENIPUNCTURE: CPT

## 2024-11-04 PROCEDURE — 85027 COMPLETE CBC AUTOMATED: CPT

## 2024-11-04 PROCEDURE — 99203 OFFICE O/P NEW LOW 30 MIN: CPT | Mod: 95 | Performed by: PHYSICIAN ASSISTANT

## 2024-11-04 RX ORDER — VENLAFAXINE HYDROCHLORIDE 75 MG/1
75 CAPSULE, EXTENDED RELEASE ORAL EVERY MORNING
COMMUNITY
Start: 2024-10-27

## 2024-11-04 ASSESSMENT — LIFESTYLE VARIABLES: TOBACCO_USE: 0

## 2024-11-04 ASSESSMENT — PAIN SCALES - GENERAL: PAINLEVEL_OUTOF10: NO PAIN (0)

## 2024-11-04 ASSESSMENT — ENCOUNTER SYMPTOMS: SEIZURES: 0

## 2024-11-04 NOTE — H&P
Pre-Operative H & P     CC:  Preoperative exam to assess for increased cardiopulmonary risk while undergoing surgery and anesthesia.    Date of Encounter: 11/4/2024  Primary Care Physician:  Mitesh Bai     Reason for visit:   Encounter Diagnosis   Name Primary?    Pre-op evaluation Yes       HPI  Rogelio Noel is a 34 year old male who presents for pre-operative H & P in preparation for  Procedure Information       Case: [id number] Date/Time: 11/07/24 1325    Procedure: LEFT LUMBAR 5-SACRAL 1 MICRODISCECTOMY, SPINE, LUMBAR, 1 LEVEL, POSTERIOR APPROACH (Left: Spine)    Anesthesia type: General    Diagnosis:       Osteoarthritis of spine with radiculopathy, lumbosacral region [M47.27]      Lumbar disc herniation with radiculopathy [M51.16]    Pre-op diagnosis:       Osteoarthritis of spine with radiculopathy, lumbosacral region [M47.27]      Lumbar disc herniation with radiculopathy [M51.16]    Location:  OR 02 / [facility]    Providers: Onel Hebert MD            Patient is being evaluated for comorbid conditions of dyslipidemia, obesity, anxiety    Mr. Noel has a 4 year history of back pain with significant radiculopathy. He has tried injections, PT, medications without significant comorbid conditions.     History is obtained from the patient and chart review    Hx of abnormal bleeding or anti-platelet use: denies      Past Medical History  No past medical history on file.    Past Surgical History  Past Surgical History:   Procedure Laterality Date    COLONOSCOPY N/A 5/10/2024    Procedure: COLONOSCOPY, FLEXIBLE, WITH LESION REMOVAL USING SNARE;  Surgeon: Collins Palacios MD;  Location: [facility]       Prior to Admission Medications  Current Outpatient Medications   Medication Sig Dispense Refill    acetaminophen (TYLENOL) 500 MG tablet Take 1,000 mg by mouth every 6 hours as needed for mild pain      gabapentin (NEURONTIN) 300 MG capsule Take 1 capsule (300 mg) by mouth 3 times daily. 270 capsule 0     ibuprofen (ADVIL/MOTRIN) 400 MG tablet Take 800 mg by mouth every 6 hours as needed for moderate pain Usually takes two.      lisinopril (ZESTRIL) 20 MG tablet Take 1 tablet (20 mg) by mouth daily (Patient taking differently: Take 20 mg by mouth every morning.) 90 tablet 1    LORazepam (ATIVAN) 1 MG tablet Take 1 tablet (1 mg) by mouth daily as needed for anxiety 20 tablet 0    venlafaxine (EFFEXOR XR) 75 MG 24 hr capsule Take 75 mg by mouth every morning.         Allergies  No Known Allergies    Social History  Social History     Socioeconomic History    Marital status:      Spouse name: Not on file    Number of children: Not on file    Years of education: Not on file    Highest education level: Not on file   Occupational History    Not on file   Tobacco Use    Smoking status: Never     Passive exposure: Never    Smokeless tobacco: Never   Vaping Use    Vaping status: Never Used   Substance and Sexual Activity    Alcohol use: Not Currently    Drug use: No    Sexual activity: Yes     Partners: Female   Other Topics Concern    Not on file   Social History Narrative    Not on file     Social Drivers of Health     Financial Resource Strain: Unknown (11/27/2023)    Financial Resource Strain     Within the past 12 months, have you or your family members you live with been unable to get utilities (heat, electricity) when it was really needed?: Patient refused   Food Insecurity: Unknown (11/27/2023)    Food Insecurity     Within the past 12 months, did you worry that your food would run out before you got money to buy more?: Patient refused     Within the past 12 months, did the food you bought just not last and you didn t have money to get more?: Patient refused   Transportation Needs: Unknown (11/27/2023)    Transportation Needs     Within the past 12 months, has lack of transportation kept you from medical appointments, getting your medicines, non-medical meetings or appointments, work, or from getting things  that you need?: Patient refused   Physical Activity: Not on file   Stress: Not on file   Social Connections: Not on file   Interpersonal Safety: Low Risk  (11/27/2023)    Interpersonal Safety     Do you feel physically and emotionally safe where you currently live?: Yes     Within the past 12 months, have you been hit, slapped, kicked or otherwise physically hurt by someone?: No     Within the past 12 months, have you been humiliated or emotionally abused in other ways by your partner or ex-partner?: No   Housing Stability: Unknown (11/27/2023)    Housing Stability     Do you have housing? : Patient refused     Are you worried about losing your housing?: Patient refused       Family History  Family History   Problem Relation Age of Onset    Cancer Mother     Diabetes Mother     Hypertension Mother     Obesity Mother     Diabetes Father     Hypertension Father     Obesity Father     Hypertension Brother     Anesthesia Reaction No family hx of     Deep Vein Thrombosis (DVT) No family hx of        Review of Systems  The complete review of systems is negative other than noted in the HPI or here.   Anesthesia Evaluation   Pt has had prior anesthetic. Type: MAC.    No history of anesthetic complications       ROS/MED HX  ENT/Pulmonary:     (+)     FELY risk factors, snores loudly,  obese,                             (-) tobacco use   Neurologic: Comment: OA of spine with radiculopathy   (-) no seizures and no CVA   Cardiovascular:     (+)  - -   -  - -                                 Previous cardiac testing   Echo: Date: Results:    Stress Test:  Date: Results:    ECG Reviewed:  Date: 11/2023 Results:  SR  Cath:  Date: Results:   (-) taking anticoagulants/antiplatelets   METS/Exercise Tolerance: 4 - Raking leaves, gardening Comment: Limited due to back symptoms, especially over last month. Was previously playing hockey. Can go for walks without BRAVO or CP.    Hematologic:  - neg hematologic  ROS  (-) history of blood clots  and history of blood transfusion   Musculoskeletal:       GI/Hepatic:  - neg GI/hepatic ROS  (-) GERD   Renal/Genitourinary:  - neg Renal ROS     Endo:     (+)               Obesity (BMI 36),    (-) chronic steroid usage   Psychiatric/Substance Use:     (+) psychiatric history anxiety       Infectious Disease:  - neg infectious disease ROS     Malignancy:       Other:            Virtual visit -  No vitals were obtained    Physical Exam  Constitutional: Awake, alert, cooperative, no apparent distress, and appears stated age.  HENT: Normocephalic  Respiratory: non labored breathing   Neurologic: Awake, alert, oriented to name, place and time.   Neuropsychiatric: Calm, cooperative. Normal affect.      Prior Labs/Diagnostic Studies   All labs and imaging personally reviewed     EKG/ stress test - if available please see in ROS above   No results found.       No data to display                  The patient's records and results personally reviewed by this provider.     Outside records reviewed from: Care Everywhere      Assessment    Rogelio Noel is a 34 year old male seen as a PAC referral for risk assessment and optimization for anesthesia.    Plan/Recommendations  Pt will be optimized for the proposed procedure.  See below for details on the assessment, risk, and preoperative recommendations    NEUROLOGY  - No history of TIA, CVA or seizure  -lumbar radiculopathy with the above procedure planned. Reports h/o feeling shock down spine intermittently. He reports his pain provider has not been concerned about this symptom. He will also discuss further with his surgeon.   -Post Op delirium risk factors:  No risk identified    ENT  - No current airway concerns.  Will need to be reassessed day of surgery.  Mallampati: Unable to assess  TM: Unable to assess    CARDIAC  - No history of CAD and Afib  -hypertension using lisinopril   -denies cardiac symptoms. Was very active playing hockey. Has more recently been limited due  "to back symptoms. Can still go for walks with dogs, but sometimes needs to stop early due to back pain. Denies BRAVO or CP  - METS (Metabolic Equivalents)  Patient performs 4 or more METS exercise without symptoms             Total Score: 0      RCRI-Very low risk: Class 1 0.4% complication rate             Total Score: 0        PULMONARY  FELY Medium Risk             Total Score: 4    FELY: Snores loudly    FELY: Hypertension    FELY: BMI over 35 kg/m2    FELY: Male      - Denies asthma or inhaler use  - Tobacco History    History   Smoking Status    Never   Smokeless Tobacco    Never       GI  PONV Medium Risk  Total Score: 2           1 AN PONV: Patient is not a current smoker    1 AN PONV: Intended Post Op Opioids        /RENAL  - Baseline Creatinine  WNL. Will update prior to surgery     ENDOCRINE    - BMI: Estimated body mass index is 36.87 kg/m  as calculated from the following:    Height as of this encounter: 1.905 m (6' 3\").    Weight as of this encounter: 133.8 kg (295 lb).  Obesity (BMI >30)  - No history of Diabetes Mellitus Last A1c 5.1    HEME  VTE Low Risk 0.5%             Total Score: 2    VTE: Male      - No history of abnormal bleeding or antiplatelet use.  -will update CBC prior to surgery     MSK  Patient is NOT Frail             Total Score: 2    Frailty: Slower walking speed    Frailty: Increased exhaustion      -PM&R referral is not indicated     Different anesthesia methods/types have been discussed with the patient, but they are aware that the final plan will be decided by the assigned anesthesia provider on the date of service.    The patient is optimized for their procedure. AVS with information on surgery time/arrival time, meds and NPO status given by nursing staff. No further diagnostic testing indicated.    Please refer to the physical examination documented by the anesthesiologist in the anesthesia record on the day of surgery.    Video-Visit Details    Type of service:  Video " Visit    Provider received verbal consent for a Video Visit from the patient? Yes     Originating Location (pt. Location): Home    Distant Location (provider location):  Off-site  Mode of Communication:  Video Conference via [employer]  On the day of service:     Prep time: 3 minutes  Visit time: 14 minutes  Documentation time: 7 minutes  ------------------------------------------  Total time: 24 minutes      Holli Kingsley PA-C  Preoperative Assessment Center  [facility]  Clinic and [facility] Center  Phone: [phone]  Fax: [phone]

## 2024-11-04 NOTE — PROGRESS NOTES
Rogelio is a 34 year old who is being evaluated via a billable video visit.    How would you like to obtain your AVS? MyChart  If the video visit is dropped, the invitation should be resent by: Text to cell phone: [phone]    Subjective   Rogelio is a 34 year old, presenting for the following health issues:  Pre-Op Exam    HPI           Physical Exam

## 2024-11-04 NOTE — PATIENT INSTRUCTIONS
Preparing for Your Surgery      Name:  Rogelio Noel   MRN:  [id number]   :  1990   Today's Date:  2024       Arriving for surgery:  Surgery date:  24  Arrival time:  11 am    Please come to:     [facility] [address]   (Address takes you to the [facility].)  [address] [place] [place]  [address]     The Green Ramp for patients and visitors is beneath the [facility]. The parking facility entrance is at the intersection of [address] [place] [address] and [address]. Patients and visitors who self-park will receive the reduced hospital parking rate (no ticket validation needed).   mobileo parking, located at the [facility] main entrance on [address] [place] [address], is available Monday - Friday from 7 am to 3:30 pm.   Discounted parking pass options can be purchased from  attendants during business hours.     -Check in at the security desk in the [facility] ([facility])   Lobby. They will direct you to the correct elevators.   -Proceed to the 3rd floor, Adult Surgery Waiting Lounge. [phone]     If you need directions, a wheelchair or escort please stop at the Information Desk in the lobby.  Inform the information person you are here for surgery; a wheelchair and escort to [address] will be provided.   An escort to the Adult Surgery Waiting Lounge will be provided. .    What can I eat or drink?  -  You may eat and drink normally up to 8 hours prior to arrival time. (Until 3 am)  -  You may have clear liquids until 2 hours prior to arrival time. (Until 9 am)    Examples of clear liquids:  Water  Clear broth  Juices (apple, white grape, white cranberry  and cider) without pulp  Noncarbonated, powder based beverages  (lemonade and Samy-Aid)  Sodas (Sprite, 7-Up, ginger ale and seltzer)  Coffee or tea (without milk or cream)  Gatorade    -  No Alcohol or  cannabis products for at least 24 hours before surgery.     Which medicines can I take?  Hold Aspirin for 7 days before surgery.   Hold Multivitamins for 7 days before surgery.  Hold Supplements for 7 days before surgery.  Hold Ibuprofen (Advil, Motrin) for 3 days before surgery--unless otherwise directed by surgeon.  Hold Naproxen (Aleve) for 4 days before surgery.  Acetaminophen (Tylenol) is okay to take if needed.    -  DO NOT take these medications the day of surgery:  Lisinopril (Zestril)    -  PLEASE TAKE these medications the day of surgery:  Gabapentin (Neurontin)  Venlafaxine (Effexor XR)  Acetaminophen (Tylenol) if needed  Lorazepam (Ativan) if needed    How do I prepare myself?  - Please take 2 showers (one the night prior to surgery and one the morning of surgery) using Scrubcare or Hibiclens soap.   You may use your own shampoo and conditioner. No other hair products.    Use this soap only from the neck to your toes. Avoid genital area    Leave the soap on your skin for one minute--then rinse thoroughly.   - Please remove all jewelry and body piercings.  - No lotions, deodorants or fragrance.  - No makeup or fingernail polish.   - Bring your ID and insurance card.    -If you use a CPAP machine, please bring the CPAP machine, tubing, and mask to hospital.    -If you have a Deep Brain Stimulator, Spinal Cord Stimulator, or any Neuro Stimulator device---you must bring the remote control to the hospital.      ALL PATIENTS GOING HOME THE SAME DAY OF SURGERY ARE REQUIRED TO HAVE A RESPONSIBLE ADULT TO DRIVE AND BE IN ATTENDANCE WITH THEM FOR 24 HOURS FOLLOWING SURGERY.    Covid testing policy as of 12/06/2022  Your surgeon will notify and schedule you for a COVID test if one is needed before surgery--please direct any questions or COVID symptoms to your surgeon      Questions or Concerns:    - For any questions regarding the day of surgery or your hospital stay, please contact the Pre Admission Nursing Office  at [phone].       - If you have health changes between today and your surgery, please call your surgeon.       - For questions after surgery, please call your surgeons office.           Current Visitor Guidelines    You may have 2 visitors in the pre op area.    Visiting hours: 8 a.m. to 8:30 p.m.    Patients confirmed or suspected to have symptoms of COVID 19 or flu:     No visitors allowed for adult patients.   Children (under age 18) can have 1 named visitor.     People who are sick or showing symptoms of COVID 19 or flu:    Are not allowed to visit patients--we can only make exceptions in special situations.       Please follow these guidelines for your visit:          Please maintain social distance          Masking is optional--however at times you may be asked to wear a mask for the safety of yourself and others     Clean your hands with alcohol hand . Do this when you arrive at and leave the building and patient room,    And again after you touch your mask or anything in the room.     Go directly to and from the room you are visiting.     Stay in the patient s room during your visit. Limit going to other places in the hospital as much as possible     Leave bags and jackets at home or in the car.     For everyone s health, please don t come and go during your visit. That includes for smoking   during your visit.

## 2024-11-07 ENCOUNTER — ANESTHESIA (OUTPATIENT)
Dept: SURGERY | Facility: CLINIC | Age: 34
DRG: 519 | End: 2024-11-07
Payer: COMMERCIAL

## 2024-11-07 ENCOUNTER — APPOINTMENT (OUTPATIENT)
Dept: GENERAL RADIOLOGY | Facility: CLINIC | Age: 34
DRG: 519 | End: 2024-11-07
Attending: ORTHOPAEDIC SURGERY
Payer: COMMERCIAL

## 2024-11-07 ENCOUNTER — HOSPITAL ENCOUNTER (INPATIENT)
Facility: CLINIC | Age: 34
LOS: 1 days | Discharge: HOME | DRG: 519 | End: 2024-11-08
Attending: ORTHOPAEDIC SURGERY | Admitting: ORTHOPAEDIC SURGERY
Payer: COMMERCIAL

## 2024-11-07 DIAGNOSIS — M51.16 LUMBAR DISC HERNIATION WITH RADICULOPATHY: Primary | ICD-10-CM

## 2024-11-07 DIAGNOSIS — M54.16 LUMBAR RADICULOPATHY: ICD-10-CM

## 2024-11-07 PROCEDURE — 250N000011 HC RX IP 250 OP 636: Performed by: STUDENT IN AN ORGANIZED HEALTH CARE EDUCATION/TRAINING PROGRAM

## 2024-11-07 PROCEDURE — 120N000002 HC R&B MED SURG/OB UMMC

## 2024-11-07 PROCEDURE — 370N000017 HC ANESTHESIA TECHNICAL FEE, PER MIN: Performed by: ORTHOPAEDIC SURGERY

## 2024-11-07 PROCEDURE — 999N000180 XR SURGERY CARM FLUORO LESS THAN 5 MIN

## 2024-11-07 PROCEDURE — 63030 LAMOT DCMPRN NRV RT 1 LMBR: CPT | Mod: LT | Performed by: ORTHOPAEDIC SURGERY

## 2024-11-07 PROCEDURE — 250N000011 HC RX IP 250 OP 636: Performed by: ORTHOPAEDIC SURGERY

## 2024-11-07 PROCEDURE — 250N000013 HC RX MED GY IP 250 OP 250 PS 637: Performed by: ORTHOPAEDIC SURGERY

## 2024-11-07 PROCEDURE — 258N000003 HC RX IP 258 OP 636

## 2024-11-07 PROCEDURE — 250N000011 HC RX IP 250 OP 636

## 2024-11-07 PROCEDURE — 999N000141 HC STATISTIC PRE-PROCEDURE NURSING ASSESSMENT: Performed by: ORTHOPAEDIC SURGERY

## 2024-11-07 PROCEDURE — 250N000013 HC RX MED GY IP 250 OP 250 PS 637: Performed by: STUDENT IN AN ORGANIZED HEALTH CARE EDUCATION/TRAINING PROGRAM

## 2024-11-07 PROCEDURE — 710N000009 HC RECOVERY PHASE 1, LEVEL 1, PER MIN: Performed by: ORTHOPAEDIC SURGERY

## 2024-11-07 PROCEDURE — 00QT0ZZ REPAIR SPINAL MENINGES, OPEN APPROACH: ICD-10-PCS | Performed by: ORTHOPAEDIC SURGERY

## 2024-11-07 PROCEDURE — 250N000009 HC RX 250: Performed by: NURSE ANESTHETIST, CERTIFIED REGISTERED

## 2024-11-07 PROCEDURE — 272N000001 HC OR GENERAL SUPPLY STERILE: Performed by: ORTHOPAEDIC SURGERY

## 2024-11-07 PROCEDURE — 250N000011 HC RX IP 250 OP 636: Performed by: NURSE ANESTHETIST, CERTIFIED REGISTERED

## 2024-11-07 PROCEDURE — 250N000009 HC RX 250

## 2024-11-07 PROCEDURE — 360N000084 HC SURGERY LEVEL 4 W/ FLUORO, PER MIN: Performed by: ORTHOPAEDIC SURGERY

## 2024-11-07 PROCEDURE — 258N000003 HC RX IP 258 OP 636: Performed by: STUDENT IN AN ORGANIZED HEALTH CARE EDUCATION/TRAINING PROGRAM

## 2024-11-07 PROCEDURE — 01NB0ZZ RELEASE LUMBAR NERVE, OPEN APPROACH: ICD-10-PCS | Performed by: ORTHOPAEDIC SURGERY

## 2024-11-07 PROCEDURE — C1763 CONN TISS, NON-HUMAN: HCPCS | Performed by: ORTHOPAEDIC SURGERY

## 2024-11-07 PROCEDURE — 250N000009 HC RX 250: Performed by: ORTHOPAEDIC SURGERY

## 2024-11-07 PROCEDURE — 272N000002 HC OR SUPPLY OTHER OPNP: Performed by: ORTHOPAEDIC SURGERY

## 2024-11-07 PROCEDURE — 0SB40ZZ EXCISION OF LUMBOSACRAL DISC, OPEN APPROACH: ICD-10-PCS | Performed by: ORTHOPAEDIC SURGERY

## 2024-11-07 PROCEDURE — 99222 1ST HOSP IP/OBS MODERATE 55: CPT

## 2024-11-07 PROCEDURE — 250N000025 HC SEVOFLURANE, PER MIN: Performed by: ORTHOPAEDIC SURGERY

## 2024-11-07 DEVICE — DURAGEN® DURAL GRAFT MATRIX 5 PK, 1X1 DOM
Type: IMPLANTABLE DEVICE | Site: SPINE LUMBAR | Status: FUNCTIONAL
Brand: DURAGEN®

## 2024-11-07 RX ORDER — SODIUM CHLORIDE 9 MG/ML
INJECTION, SOLUTION INTRAVENOUS CONTINUOUS
Status: DISCONTINUED | OUTPATIENT
Start: 2024-11-07 | End: 2024-11-08

## 2024-11-07 RX ORDER — ONDANSETRON 4 MG/1
4 TABLET, ORALLY DISINTEGRATING ORAL EVERY 30 MIN PRN
Status: DISCONTINUED | OUTPATIENT
Start: 2024-11-07 | End: 2024-11-07 | Stop reason: HOSPADM

## 2024-11-07 RX ORDER — TRANEXAMIC ACID 10 MG/ML
1 INJECTION, SOLUTION INTRAVENOUS ONCE
Status: DISCONTINUED | OUTPATIENT
Start: 2024-11-07 | End: 2024-11-07 | Stop reason: HOSPADM

## 2024-11-07 RX ORDER — SODIUM CHLORIDE, SODIUM LACTATE, POTASSIUM CHLORIDE, CALCIUM CHLORIDE 600; 310; 30; 20 MG/100ML; MG/100ML; MG/100ML; MG/100ML
INJECTION, SOLUTION INTRAVENOUS CONTINUOUS PRN
Status: DISCONTINUED | OUTPATIENT
Start: 2024-11-07 | End: 2024-11-07

## 2024-11-07 RX ORDER — LIDOCAINE 40 MG/G
CREAM TOPICAL
Status: DISCONTINUED | OUTPATIENT
Start: 2024-11-07 | End: 2024-11-08 | Stop reason: HOSPADM

## 2024-11-07 RX ORDER — LIDOCAINE HYDROCHLORIDE AND EPINEPHRINE 5; 5 MG/ML; UG/ML
INJECTION, SOLUTION INFILTRATION; PERINEURAL PRN
Status: DISCONTINUED | OUTPATIENT
Start: 2024-11-07 | End: 2024-11-07 | Stop reason: HOSPADM

## 2024-11-07 RX ORDER — FENTANYL CITRATE 50 UG/ML
INJECTION, SOLUTION INTRAMUSCULAR; INTRAVENOUS PRN
Status: DISCONTINUED | OUTPATIENT
Start: 2024-11-07 | End: 2024-11-07

## 2024-11-07 RX ORDER — ONDANSETRON 2 MG/ML
4 INJECTION INTRAMUSCULAR; INTRAVENOUS EVERY 6 HOURS PRN
Status: DISCONTINUED | OUTPATIENT
Start: 2024-11-07 | End: 2024-11-08 | Stop reason: HOSPADM

## 2024-11-07 RX ORDER — DEXAMETHASONE SODIUM PHOSPHATE 4 MG/ML
4 INJECTION, SOLUTION INTRA-ARTICULAR; INTRALESIONAL; INTRAMUSCULAR; INTRAVENOUS; SOFT TISSUE
Status: DISCONTINUED | OUTPATIENT
Start: 2024-11-07 | End: 2024-11-07

## 2024-11-07 RX ORDER — DEXAMETHASONE SODIUM PHOSPHATE 4 MG/ML
4 INJECTION, SOLUTION INTRA-ARTICULAR; INTRALESIONAL; INTRAMUSCULAR; INTRAVENOUS; SOFT TISSUE
Status: DISCONTINUED | OUTPATIENT
Start: 2024-11-07 | End: 2024-11-07 | Stop reason: HOSPADM

## 2024-11-07 RX ORDER — ONDANSETRON 4 MG/1
4 TABLET, ORALLY DISINTEGRATING ORAL EVERY 6 HOURS PRN
Status: DISCONTINUED | OUTPATIENT
Start: 2024-11-07 | End: 2024-11-08 | Stop reason: HOSPADM

## 2024-11-07 RX ORDER — PROCHLORPERAZINE MALEATE 10 MG
10 TABLET ORAL EVERY 6 HOURS PRN
Status: DISCONTINUED | OUTPATIENT
Start: 2024-11-07 | End: 2024-11-08 | Stop reason: HOSPADM

## 2024-11-07 RX ORDER — PROPOFOL 10 MG/ML
INJECTION, EMULSION INTRAVENOUS PRN
Status: DISCONTINUED | OUTPATIENT
Start: 2024-11-07 | End: 2024-11-07

## 2024-11-07 RX ORDER — SODIUM CHLORIDE, SODIUM LACTATE, POTASSIUM CHLORIDE, CALCIUM CHLORIDE 600; 310; 30; 20 MG/100ML; MG/100ML; MG/100ML; MG/100ML
INJECTION, SOLUTION INTRAVENOUS CONTINUOUS
Status: DISCONTINUED | OUTPATIENT
Start: 2024-11-07 | End: 2024-11-07

## 2024-11-07 RX ORDER — TRANEXAMIC ACID 10 MG/ML
1 INJECTION, SOLUTION INTRAVENOUS ONCE
Status: COMPLETED | OUTPATIENT
Start: 2024-11-07 | End: 2024-11-07

## 2024-11-07 RX ORDER — ACETAMINOPHEN 325 MG/1
650 TABLET ORAL EVERY 8 HOURS PRN
Qty: 20 TABLET | Refills: 0 | Status: SHIPPED | OUTPATIENT
Start: 2024-11-07

## 2024-11-07 RX ORDER — OXYCODONE HYDROCHLORIDE 5 MG/1
5 TABLET ORAL EVERY 6 HOURS PRN
Qty: 20 TABLET | Refills: 0 | Status: SHIPPED | OUTPATIENT
Start: 2024-11-07 | End: 2024-11-08

## 2024-11-07 RX ORDER — ONDANSETRON 2 MG/ML
INJECTION INTRAMUSCULAR; INTRAVENOUS PRN
Status: DISCONTINUED | OUTPATIENT
Start: 2024-11-07 | End: 2024-11-07

## 2024-11-07 RX ORDER — NALOXONE HYDROCHLORIDE 0.4 MG/ML
0.1 INJECTION, SOLUTION INTRAMUSCULAR; INTRAVENOUS; SUBCUTANEOUS
Status: DISCONTINUED | OUTPATIENT
Start: 2024-11-07 | End: 2024-11-07 | Stop reason: HOSPADM

## 2024-11-07 RX ORDER — NALOXONE HYDROCHLORIDE 0.4 MG/ML
0.1 INJECTION, SOLUTION INTRAMUSCULAR; INTRAVENOUS; SUBCUTANEOUS
Status: DISCONTINUED | OUTPATIENT
Start: 2024-11-07 | End: 2024-11-07

## 2024-11-07 RX ORDER — DEXAMETHASONE SODIUM PHOSPHATE 4 MG/ML
INJECTION, SOLUTION INTRA-ARTICULAR; INTRALESIONAL; INTRAMUSCULAR; INTRAVENOUS; SOFT TISSUE PRN
Status: DISCONTINUED | OUTPATIENT
Start: 2024-11-07 | End: 2024-11-07

## 2024-11-07 RX ORDER — HYDROMORPHONE HYDROCHLORIDE 1 MG/ML
0.4 INJECTION, SOLUTION INTRAMUSCULAR; INTRAVENOUS; SUBCUTANEOUS EVERY 5 MIN PRN
Status: DISCONTINUED | OUTPATIENT
Start: 2024-11-07 | End: 2024-11-07

## 2024-11-07 RX ORDER — KETAMINE HYDROCHLORIDE 10 MG/ML
INJECTION INTRAMUSCULAR; INTRAVENOUS PRN
Status: DISCONTINUED | OUTPATIENT
Start: 2024-11-07 | End: 2024-11-07

## 2024-11-07 RX ORDER — VENLAFAXINE HYDROCHLORIDE 75 MG/1
75 CAPSULE, EXTENDED RELEASE ORAL EVERY MORNING
Status: DISCONTINUED | OUTPATIENT
Start: 2024-11-08 | End: 2024-11-08 | Stop reason: HOSPADM

## 2024-11-07 RX ORDER — LIDOCAINE HYDROCHLORIDE 20 MG/ML
INJECTION, SOLUTION INFILTRATION; PERINEURAL PRN
Status: DISCONTINUED | OUTPATIENT
Start: 2024-11-07 | End: 2024-11-07

## 2024-11-07 RX ORDER — ACETAMINOPHEN 325 MG/1
650 TABLET ORAL EVERY 4 HOURS PRN
Status: DISCONTINUED | OUTPATIENT
Start: 2024-11-10 | End: 2024-11-08 | Stop reason: HOSPADM

## 2024-11-07 RX ORDER — ACETAMINOPHEN 325 MG/1
975 TABLET ORAL EVERY 8 HOURS
Status: DISCONTINUED | OUTPATIENT
Start: 2024-11-07 | End: 2024-11-08

## 2024-11-07 RX ORDER — BISACODYL 10 MG
10 SUPPOSITORY, RECTAL RECTAL DAILY PRN
Status: DISCONTINUED | OUTPATIENT
Start: 2024-11-10 | End: 2024-11-08 | Stop reason: HOSPADM

## 2024-11-07 RX ORDER — ONDANSETRON 4 MG/1
4 TABLET, ORALLY DISINTEGRATING ORAL EVERY 30 MIN PRN
Status: DISCONTINUED | OUTPATIENT
Start: 2024-11-07 | End: 2024-11-07

## 2024-11-07 RX ORDER — METHOCARBAMOL 500 MG/1
500 TABLET, FILM COATED ORAL 4 TIMES DAILY PRN
Qty: 5 TABLET | Refills: 0 | Status: SHIPPED | OUTPATIENT
Start: 2024-11-07 | End: 2024-11-08

## 2024-11-07 RX ORDER — ARGININE/GLUTAMINE/CALCIUM BMB 7G-7G-1.5G
1 POWDER IN PACKET (EA) ORAL
Status: DISCONTINUED | OUTPATIENT
Start: 2024-11-08 | End: 2024-11-08

## 2024-11-07 RX ORDER — ACETAMINOPHEN 325 MG/1
975 TABLET ORAL ONCE
Status: COMPLETED | OUTPATIENT
Start: 2024-11-07 | End: 2024-11-07

## 2024-11-07 RX ORDER — CELECOXIB 200 MG/1
400 CAPSULE ORAL ONCE
Status: COMPLETED | OUTPATIENT
Start: 2024-11-07 | End: 2024-11-07

## 2024-11-07 RX ORDER — KETOROLAC TROMETHAMINE 30 MG/ML
30 INJECTION, SOLUTION INTRAMUSCULAR; INTRAVENOUS EVERY 8 HOURS
Status: DISCONTINUED | OUTPATIENT
Start: 2024-11-07 | End: 2024-11-08 | Stop reason: HOSPADM

## 2024-11-07 RX ORDER — GABAPENTIN 300 MG/1
300 CAPSULE ORAL 3 TIMES DAILY
Status: DISCONTINUED | OUTPATIENT
Start: 2024-11-07 | End: 2024-11-07

## 2024-11-07 RX ORDER — ONDANSETRON 2 MG/ML
4 INJECTION INTRAMUSCULAR; INTRAVENOUS EVERY 30 MIN PRN
Status: DISCONTINUED | OUTPATIENT
Start: 2024-11-07 | End: 2024-11-07

## 2024-11-07 RX ORDER — HYDROMORPHONE HYDROCHLORIDE 1 MG/ML
0.4 INJECTION, SOLUTION INTRAMUSCULAR; INTRAVENOUS; SUBCUTANEOUS
Status: DISCONTINUED | OUTPATIENT
Start: 2024-11-07 | End: 2024-11-08 | Stop reason: HOSPADM

## 2024-11-07 RX ORDER — AMOXICILLIN 250 MG
1 CAPSULE ORAL 2 TIMES DAILY
Status: DISCONTINUED | OUTPATIENT
Start: 2024-11-07 | End: 2024-11-08

## 2024-11-07 RX ORDER — OXYCODONE HYDROCHLORIDE 5 MG/1
5 TABLET ORAL
Status: DISCONTINUED | OUTPATIENT
Start: 2024-11-07 | End: 2024-11-07 | Stop reason: HOSPADM

## 2024-11-07 RX ORDER — HYDROMORPHONE HYDROCHLORIDE 1 MG/ML
0.2 INJECTION, SOLUTION INTRAMUSCULAR; INTRAVENOUS; SUBCUTANEOUS
Status: DISCONTINUED | OUTPATIENT
Start: 2024-11-07 | End: 2024-11-08 | Stop reason: HOSPADM

## 2024-11-07 RX ORDER — LISINOPRIL 20 MG/1
20 TABLET ORAL EVERY MORNING
Status: DISCONTINUED | OUTPATIENT
Start: 2024-11-08 | End: 2024-11-08 | Stop reason: HOSPADM

## 2024-11-07 RX ORDER — METHOCARBAMOL 750 MG/1
750 TABLET, FILM COATED ORAL EVERY 6 HOURS PRN
Status: DISCONTINUED | OUTPATIENT
Start: 2024-11-07 | End: 2024-11-08 | Stop reason: HOSPADM

## 2024-11-07 RX ORDER — GABAPENTIN 300 MG/1
300 CAPSULE ORAL 3 TIMES DAILY
Status: DISCONTINUED | OUTPATIENT
Start: 2024-11-08 | End: 2024-11-08 | Stop reason: HOSPADM

## 2024-11-07 RX ORDER — CEFAZOLIN SODIUM 2 G/100ML
2 INJECTION, SOLUTION INTRAVENOUS EVERY 8 HOURS
Status: COMPLETED | OUTPATIENT
Start: 2024-11-08 | End: 2024-11-08

## 2024-11-07 RX ORDER — HYDROMORPHONE HYDROCHLORIDE 2 MG/1
4 TABLET ORAL EVERY 4 HOURS PRN
Status: DISCONTINUED | OUTPATIENT
Start: 2024-11-07 | End: 2024-11-08 | Stop reason: HOSPADM

## 2024-11-07 RX ORDER — HYDROMORPHONE HYDROCHLORIDE 1 MG/ML
0.2 INJECTION, SOLUTION INTRAMUSCULAR; INTRAVENOUS; SUBCUTANEOUS EVERY 5 MIN PRN
Status: DISCONTINUED | OUTPATIENT
Start: 2024-11-07 | End: 2024-11-07

## 2024-11-07 RX ORDER — OXYCODONE HYDROCHLORIDE 10 MG/1
10 TABLET ORAL
Status: DISCONTINUED | OUTPATIENT
Start: 2024-11-07 | End: 2024-11-07 | Stop reason: HOSPADM

## 2024-11-07 RX ORDER — CEFAZOLIN SODIUM/WATER 3 G/30 ML
3 SYRINGE (ML) INTRAVENOUS
Status: COMPLETED | OUTPATIENT
Start: 2024-11-07 | End: 2024-11-07

## 2024-11-07 RX ORDER — FENTANYL CITRATE 50 UG/ML
50 INJECTION, SOLUTION INTRAMUSCULAR; INTRAVENOUS EVERY 5 MIN PRN
Status: DISCONTINUED | OUTPATIENT
Start: 2024-11-07 | End: 2024-11-07

## 2024-11-07 RX ORDER — FENTANYL CITRATE 50 UG/ML
25 INJECTION, SOLUTION INTRAMUSCULAR; INTRAVENOUS EVERY 5 MIN PRN
Status: DISCONTINUED | OUTPATIENT
Start: 2024-11-07 | End: 2024-11-07

## 2024-11-07 RX ORDER — HYDROMORPHONE HYDROCHLORIDE 2 MG/1
2 TABLET ORAL EVERY 4 HOURS PRN
Status: DISCONTINUED | OUTPATIENT
Start: 2024-11-07 | End: 2024-11-08 | Stop reason: HOSPADM

## 2024-11-07 RX ORDER — ONDANSETRON 2 MG/ML
4 INJECTION INTRAMUSCULAR; INTRAVENOUS EVERY 30 MIN PRN
Status: DISCONTINUED | OUTPATIENT
Start: 2024-11-07 | End: 2024-11-07 | Stop reason: HOSPADM

## 2024-11-07 RX ORDER — GABAPENTIN 100 MG/1
100 CAPSULE ORAL 3 TIMES DAILY
Status: DISCONTINUED | OUTPATIENT
Start: 2024-11-07 | End: 2024-11-07

## 2024-11-07 RX ORDER — POLYETHYLENE GLYCOL 3350 17 G/17G
17 POWDER, FOR SOLUTION ORAL DAILY
Status: DISCONTINUED | OUTPATIENT
Start: 2024-11-08 | End: 2024-11-08

## 2024-11-07 RX ORDER — CEFAZOLIN SODIUM/WATER 3 G/30 ML
3 SYRINGE (ML) INTRAVENOUS SEE ADMIN INSTRUCTIONS
Status: DISCONTINUED | OUTPATIENT
Start: 2024-11-07 | End: 2024-11-07 | Stop reason: HOSPADM

## 2024-11-07 RX ORDER — FAMOTIDINE 20 MG/1
20 TABLET, FILM COATED ORAL 2 TIMES DAILY
Status: DISCONTINUED | OUTPATIENT
Start: 2024-11-07 | End: 2024-11-08 | Stop reason: HOSPADM

## 2024-11-07 RX ADMIN — CELECOXIB 400 MG: 200 CAPSULE ORAL at 12:07

## 2024-11-07 RX ADMIN — HYDROMORPHONE HYDROCHLORIDE 0.2 MG: 1 INJECTION, SOLUTION INTRAMUSCULAR; INTRAVENOUS; SUBCUTANEOUS at 20:18

## 2024-11-07 RX ADMIN — Medication 80 MG: at 15:48

## 2024-11-07 RX ADMIN — Medication 10 MG: at 17:07

## 2024-11-07 RX ADMIN — FENTANYL CITRATE 50 MCG: 50 INJECTION INTRAMUSCULAR; INTRAVENOUS at 19:47

## 2024-11-07 RX ADMIN — LIDOCAINE HYDROCHLORIDE 100 MG: 20 INJECTION, SOLUTION INFILTRATION; PERINEURAL at 15:46

## 2024-11-07 RX ADMIN — ACETAMINOPHEN 975 MG: 325 TABLET, FILM COATED ORAL at 12:06

## 2024-11-07 RX ADMIN — HYDROMORPHONE HYDROCHLORIDE 0.5 MG: 1 INJECTION, SOLUTION INTRAMUSCULAR; INTRAVENOUS; SUBCUTANEOUS at 18:34

## 2024-11-07 RX ADMIN — REMIFENTANIL HYDROCHLORIDE 0.3 MCG/KG/MIN: 1 INJECTION, POWDER, LYOPHILIZED, FOR SOLUTION INTRAVENOUS at 16:12

## 2024-11-07 RX ADMIN — TRANEXAMIC ACID 1 G: 10 INJECTION, SOLUTION INTRAVENOUS at 16:18

## 2024-11-07 RX ADMIN — SENNOSIDES AND DOCUSATE SODIUM 1 TABLET: 50; 8.6 TABLET ORAL at 22:50

## 2024-11-07 RX ADMIN — PHENYLEPHRINE HYDROCHLORIDE 100 MCG: 10 INJECTION INTRAVENOUS at 17:29

## 2024-11-07 RX ADMIN — ONDANSETRON 4 MG: 2 INJECTION INTRAMUSCULAR; INTRAVENOUS at 20:49

## 2024-11-07 RX ADMIN — FENTANYL CITRATE 100 MCG: 50 INJECTION INTRAMUSCULAR; INTRAVENOUS at 15:46

## 2024-11-07 RX ADMIN — SODIUM CHLORIDE, POTASSIUM CHLORIDE, SODIUM LACTATE AND CALCIUM CHLORIDE: 600; 310; 30; 20 INJECTION, SOLUTION INTRAVENOUS at 17:30

## 2024-11-07 RX ADMIN — FAMOTIDINE 20 MG: 20 TABLET ORAL at 22:50

## 2024-11-07 RX ADMIN — HYDROMORPHONE HYDROCHLORIDE 0.5 MG: 1 INJECTION, SOLUTION INTRAMUSCULAR; INTRAVENOUS; SUBCUTANEOUS at 17:14

## 2024-11-07 RX ADMIN — PHENYLEPHRINE HYDROCHLORIDE 100 MCG: 10 INJECTION INTRAVENOUS at 16:28

## 2024-11-07 RX ADMIN — SODIUM CHLORIDE: 9 INJECTION, SOLUTION INTRAVENOUS at 22:51

## 2024-11-07 RX ADMIN — DEXMEDETOMIDINE HYDROCHLORIDE 8 MCG: 100 INJECTION, SOLUTION INTRAVENOUS at 18:39

## 2024-11-07 RX ADMIN — ONDANSETRON 4 MG: 2 INJECTION INTRAMUSCULAR; INTRAVENOUS at 18:40

## 2024-11-07 RX ADMIN — Medication 10 MG: at 17:29

## 2024-11-07 RX ADMIN — DEXAMETHASONE SODIUM PHOSPHATE 8 MG: 4 INJECTION, SOLUTION INTRAMUSCULAR; INTRAVENOUS at 16:18

## 2024-11-07 RX ADMIN — ACETAMINOPHEN 975 MG: 325 TABLET, FILM COATED ORAL at 20:45

## 2024-11-07 RX ADMIN — PROCHLORPERAZINE EDISYLATE 10 MG: 5 INJECTION INTRAMUSCULAR; INTRAVENOUS at 22:51

## 2024-11-07 RX ADMIN — SODIUM CHLORIDE, POTASSIUM CHLORIDE, SODIUM LACTATE AND CALCIUM CHLORIDE: 600; 310; 30; 20 INJECTION, SOLUTION INTRAVENOUS at 15:42

## 2024-11-07 RX ADMIN — Medication 30 MG: at 16:31

## 2024-11-07 RX ADMIN — DEXMEDETOMIDINE HYDROCHLORIDE 12 MCG: 100 INJECTION, SOLUTION INTRAVENOUS at 17:19

## 2024-11-07 RX ADMIN — PHENYLEPHRINE HYDROCHLORIDE 100 MCG: 10 INJECTION INTRAVENOUS at 16:52

## 2024-11-07 RX ADMIN — SUGAMMADEX 200 MG: 100 INJECTION, SOLUTION INTRAVENOUS at 18:49

## 2024-11-07 RX ADMIN — GABAPENTIN 300 MG: 300 CAPSULE ORAL at 20:45

## 2024-11-07 RX ADMIN — PROPOFOL 200 MG: 10 INJECTION, EMULSION INTRAVENOUS at 15:48

## 2024-11-07 RX ADMIN — Medication 3 G: at 16:10

## 2024-11-07 RX ADMIN — TRANEXAMIC ACID 1 G: 10 INJECTION, SOLUTION INTRAVENOUS at 16:10

## 2024-11-07 RX ADMIN — MIDAZOLAM 2 MG: 1 INJECTION INTRAMUSCULAR; INTRAVENOUS at 15:40

## 2024-11-07 RX ADMIN — PROPOFOL 90 MG: 10 INJECTION, EMULSION INTRAVENOUS at 15:50

## 2024-11-07 ASSESSMENT — ACTIVITIES OF DAILY LIVING (ADL)
ADLS_ACUITY_SCORE: 0

## 2024-11-07 NOTE — ANESTHESIA PROCEDURE NOTES
Airway         Procedure Start/Stop Times: 11/7/2024 3:50 PM  Staff -        CRNA: Deepthi Roper APRN CRNA       Performed By: CRNAIndications and Patient Condition       Indications for airway management: avila-procedural         Mask difficulty assessment: 1 - vent by mask    Final Airway Details       Final airway type: endotracheal airway       Successful airway: ETT - single  Endotracheal Airway Details        ETT size (mm): 8.0       Cuffed: yes       Successful intubation technique: direct laryngoscopy       DL Blade Type: MAC 4       Grade View of Cords: 1       Adjucts: stylet       Position: Right       Measured from: lips       Secured at (cm): 22       Bite block used: Soft    Post intubation assessment        Placement verified by: capnometry, equal breath sounds and chest rise        Number of attempts at approach: 2 (7.5 ETT placed on first attempt. Removed and replaced with 8.0 ETT due to significant cuff leak)       Secured with: tape and other (comment) (tegaderm)       Ease of procedure: easy       Dentition: Intact and Unchanged    Medication(s) Administered   Medication Administration Time: 11/7/2024 3:50 PM

## 2024-11-07 NOTE — ANESTHESIA PREPROCEDURE EVALUATION
Anesthesia Pre-Procedure Evaluation    Patient: Rogelio Noel   MRN: [id number] : 1990        Procedure : Procedure(s):  LEFT LUMBAR 5-SACRAL 1 MICRODISCECTOMY, SPINE, LUMBAR, 1 LEVEL, POSTERIOR APPROACH          No past medical history on file.   Past Surgical History:   Procedure Laterality Date     COLONOSCOPY N/A 5/10/2024    Procedure: COLONOSCOPY, FLEXIBLE, WITH LESION REMOVAL USING SNARE;  Surgeon: Collins Palacios MD;  Location: [facility]      No Known Allergies   Social History     Tobacco Use     Smoking status: Never     Passive exposure: Never     Smokeless tobacco: Never   Substance Use Topics     Alcohol use: Not Currently      Wt Readings from Last 1 Encounters:   24 133.8 kg (295 lb)        Anesthesia Evaluation   Pt has had prior anesthetic. Type: MAC.        ROS/MED HX  ENT/Pulmonary:     (+)     FELY risk factors, snores loudly, hypertension, obese,                                Neurologic:       Cardiovascular:     (+)  hypertension-range: 130-145/70-80mmHg/ -   -  - -                                      METS/Exercise Tolerance: 4 - Raking leaves, gardening    Hematologic:       Musculoskeletal:       GI/Hepatic:       Renal/Genitourinary:       Endo:     (+)               Obesity,       Psychiatric/Substance Use:     (+) psychiatric history anxiety       Infectious Disease:       Malignancy:       Other:  - neg other ROS    (+)  , H/O Chronic Pain,         Physical Exam    Airway        Mallampati: I   TM distance: > 3 FB   Neck ROM: full   Mouth opening: > 3 cm    Respiratory Devices and Support         Dental       (+) Minor Abnormalities - some fillings, tiny chips    B=Bridge, C=Chipped, L=Loose, M=Missing    Cardiovascular          Rhythm and rate: regular and normal     Pulmonary           breath sounds clear to auscultation       Other findings: Left upper central incisor tooth has cap.  OUTSIDE LABS:  CBC:   Lab Results   Component Value Date    WBC 6.4 2024    HGB  "15.5 11/04/2024    HCT 46.2 11/04/2024     11/04/2024     BMP:   Lab Results   Component Value Date     11/04/2024     05/01/2024    POTASSIUM 4.3 11/04/2024    POTASSIUM 4.3 05/01/2024    CHLORIDE 102 11/04/2024    CHLORIDE 102 05/01/2024    CO2 24 11/04/2024    CO2 25 05/01/2024    BUN 25.6 (H) 11/04/2024    BUN 26.2 (H) 05/01/2024    CR 0.98 11/04/2024    CR 1.09 05/01/2024     (H) 11/04/2024     (H) 05/01/2024     COAGS: No results found for: \"PTT\", \"INR\", \"FIBR\"  POC: No results found for: \"BGM\", \"HCG\", \"HCGS\"  HEPATIC: No results found for: \"ALBUMIN\", \"PROTTOTAL\", \"ALT\", \"AST\", \"GGT\", \"ALKPHOS\", \"BILITOTAL\", \"BILIDIRECT\", \"BORIS\"  OTHER:   Lab Results   Component Value Date    A1C 5.1 05/01/2024    WALT 9.5 11/04/2024    TSH 1.24 02/11/2020       Anesthesia Plan    ASA Status:  2    NPO Status:  NPO Appropriate    Anesthesia Type: General.     - Airway: ETT   Induction: Intravenous, Propofol.   Maintenance: Balanced.   Techniques and Equipment:     - Airway: Video-Laryngoscope     - Lines/Monitors: BIS, 2nd IV     - Drips/Meds: Tranexamic acid, Ketamine, Remifentanil     Consents    Anesthesia Plan(s) and associated risks, benefits, and realistic alternatives discussed. Questions answered and patient/representative(s) expressed understanding.     - Discussed: Risks, Benefits and Alternatives for the PROCEDURE were discussed     - Discussed with:  Patient, Spouse      - Extended Intubation/Ventilatory Support Discussed: No.      - Patient is DNR/DNI Status: No     Use of blood products discussed: No .     Postoperative Care    Pain management: IV analgesics, Oral pain medications, Multi-modal analgesia.   PONV prophylaxis: Ondansetron (or other 5HT-3), Dexamethasone or Solumedrol     Comments:               Ángel Camacho MD    I have reviewed the pertinent notes and labs in the chart from the past 30 days and (re)examined the patient.  Any updates or changes from those notes are " "reflected in this note.                         # Obesity: Estimated body mass index is 36.87 kg/m  as calculated from the following:    Height as of 11/4/24: 1.905 m (6' 3\").    Weight as of 11/4/24: 133.8 kg (295 lb).             "

## 2024-11-08 ENCOUNTER — APPOINTMENT (OUTPATIENT)
Dept: PHYSICAL THERAPY | Facility: CLINIC | Age: 34
DRG: 519 | End: 2024-11-08
Attending: STUDENT IN AN ORGANIZED HEALTH CARE EDUCATION/TRAINING PROGRAM
Payer: COMMERCIAL

## 2024-11-08 VITALS
BODY MASS INDEX: 36.48 KG/M2 | SYSTOLIC BLOOD PRESSURE: 113 MMHG | HEART RATE: 88 BPM | HEIGHT: 76 IN | WEIGHT: 299.61 LBS | OXYGEN SATURATION: 98 % | DIASTOLIC BLOOD PRESSURE: 61 MMHG | RESPIRATION RATE: 18 BRPM | TEMPERATURE: 98.1 F

## 2024-11-08 LAB — GLUCOSE BLDC GLUCOMTR-MCNC: 125 MG/DL (ref 70–99)

## 2024-11-08 PROCEDURE — 97530 THERAPEUTIC ACTIVITIES: CPT | Mod: GP

## 2024-11-08 PROCEDURE — 250N000011 HC RX IP 250 OP 636: Performed by: STUDENT IN AN ORGANIZED HEALTH CARE EDUCATION/TRAINING PROGRAM

## 2024-11-08 PROCEDURE — 250N000013 HC RX MED GY IP 250 OP 250 PS 637: Performed by: INTERNAL MEDICINE

## 2024-11-08 PROCEDURE — 250N000013 HC RX MED GY IP 250 OP 250 PS 637

## 2024-11-08 PROCEDURE — 250N000013 HC RX MED GY IP 250 OP 250 PS 637: Performed by: NURSE PRACTITIONER

## 2024-11-08 PROCEDURE — 99232 SBSQ HOSP IP/OBS MODERATE 35: CPT | Performed by: INTERNAL MEDICINE

## 2024-11-08 PROCEDURE — 250N000013 HC RX MED GY IP 250 OP 250 PS 637: Performed by: STUDENT IN AN ORGANIZED HEALTH CARE EDUCATION/TRAINING PROGRAM

## 2024-11-08 PROCEDURE — 97116 GAIT TRAINING THERAPY: CPT | Mod: GP

## 2024-11-08 PROCEDURE — 97161 PT EVAL LOW COMPLEX 20 MIN: CPT | Mod: GP

## 2024-11-08 PROCEDURE — 999N000111 HC STATISTIC OT IP EVAL DEFER

## 2024-11-08 RX ORDER — ARGININE/GLUTAMINE/CALCIUM BMB 7G-7G-1.5G
1 POWDER IN PACKET (EA) ORAL 2 TIMES DAILY WITH MEALS
Status: DISCONTINUED | OUTPATIENT
Start: 2024-11-08 | End: 2024-11-08 | Stop reason: HOSPADM

## 2024-11-08 RX ORDER — POLYETHYLENE GLYCOL 3350 17 G/17G
17 POWDER, FOR SOLUTION ORAL DAILY
COMMUNITY
Start: 2024-11-08

## 2024-11-08 RX ORDER — TAMSULOSIN HYDROCHLORIDE 0.4 MG/1
0.4 CAPSULE ORAL ONCE
Status: COMPLETED | OUTPATIENT
Start: 2024-11-08 | End: 2024-11-08

## 2024-11-08 RX ORDER — AMOXICILLIN 250 MG
1 CAPSULE ORAL AT BEDTIME
COMMUNITY
Start: 2024-11-08

## 2024-11-08 RX ORDER — POLYETHYLENE GLYCOL 3350 17 G/17G
17 POWDER, FOR SOLUTION ORAL DAILY
Status: DISCONTINUED | OUTPATIENT
Start: 2024-11-08 | End: 2024-11-08 | Stop reason: HOSPADM

## 2024-11-08 RX ORDER — ACETAMINOPHEN 325 MG/1
975 TABLET ORAL EVERY 6 HOURS
Status: DISCONTINUED | OUTPATIENT
Start: 2024-11-08 | End: 2024-11-08 | Stop reason: HOSPADM

## 2024-11-08 RX ORDER — ARGININE/GLUTAMINE/CALCIUM BMB 7G-7G-1.5G
1 POWDER IN PACKET (EA) ORAL 2 TIMES DAILY WITH MEALS
Qty: 26 PACKET | Refills: 0 | Status: SHIPPED | OUTPATIENT
Start: 2024-11-08 | End: 2024-11-21

## 2024-11-08 RX ORDER — AMOXICILLIN 250 MG
1 CAPSULE ORAL AT BEDTIME
Status: DISCONTINUED | OUTPATIENT
Start: 2024-11-08 | End: 2024-11-08 | Stop reason: HOSPADM

## 2024-11-08 RX ORDER — HYDROMORPHONE HYDROCHLORIDE 2 MG/1
2-4 TABLET ORAL EVERY 4 HOURS PRN
Qty: 12 TABLET | Refills: 0 | Status: SHIPPED | OUTPATIENT
Start: 2024-11-08

## 2024-11-08 RX ORDER — METHOCARBAMOL 500 MG/1
500 TABLET, FILM COATED ORAL 4 TIMES DAILY PRN
Qty: 15 TABLET | Refills: 0 | Status: SHIPPED | OUTPATIENT
Start: 2024-11-08

## 2024-11-08 RX ADMIN — CEFAZOLIN SODIUM 2 G: 2 INJECTION, SOLUTION INTRAVENOUS at 09:02

## 2024-11-08 RX ADMIN — GABAPENTIN 300 MG: 300 CAPSULE ORAL at 13:53

## 2024-11-08 RX ADMIN — ACETAMINOPHEN 975 MG: 325 TABLET, FILM COATED ORAL at 05:34

## 2024-11-08 RX ADMIN — METHOCARBAMOL 750 MG: 750 TABLET ORAL at 09:11

## 2024-11-08 RX ADMIN — TAMSULOSIN HYDROCHLORIDE 0.4 MG: 0.4 CAPSULE ORAL at 12:40

## 2024-11-08 RX ADMIN — SENNOSIDES AND DOCUSATE SODIUM 1 TABLET: 50; 8.6 TABLET ORAL at 12:40

## 2024-11-08 RX ADMIN — HYDROMORPHONE HYDROCHLORIDE 4 MG: 2 TABLET ORAL at 05:34

## 2024-11-08 RX ADMIN — NALOXEGOL OXALATE 25 MG: 12.5 TABLET, FILM COATED ORAL at 09:05

## 2024-11-08 RX ADMIN — HYDROMORPHONE HYDROCHLORIDE 4 MG: 2 TABLET ORAL at 13:54

## 2024-11-08 RX ADMIN — ACETAMINOPHEN 975 MG: 325 TABLET, FILM COATED ORAL at 12:39

## 2024-11-08 RX ADMIN — VENLAFAXINE HYDROCHLORIDE 75 MG: 75 CAPSULE, EXTENDED RELEASE ORAL at 09:05

## 2024-11-08 RX ADMIN — POLYETHYLENE GLYCOL 3350 17 G: 17 POWDER, FOR SOLUTION ORAL at 12:39

## 2024-11-08 RX ADMIN — GABAPENTIN 300 MG: 300 CAPSULE ORAL at 09:04

## 2024-11-08 RX ADMIN — HYDROMORPHONE HYDROCHLORIDE 4 MG: 2 TABLET ORAL at 09:11

## 2024-11-08 RX ADMIN — KETOROLAC TROMETHAMINE 30 MG: 30 INJECTION, SOLUTION INTRAMUSCULAR at 05:35

## 2024-11-08 RX ADMIN — CEFAZOLIN SODIUM 2 G: 2 INJECTION, SOLUTION INTRAVENOUS at 00:30

## 2024-11-08 RX ADMIN — HYDROMORPHONE HYDROCHLORIDE 4 MG: 2 TABLET ORAL at 00:29

## 2024-11-08 RX ADMIN — KETOROLAC TROMETHAMINE 30 MG: 30 INJECTION, SOLUTION INTRAMUSCULAR at 12:50

## 2024-11-08 RX ADMIN — FAMOTIDINE 20 MG: 20 TABLET ORAL at 09:04

## 2024-11-08 RX ADMIN — METHOCARBAMOL 750 MG: 750 TABLET ORAL at 00:30

## 2024-11-08 ASSESSMENT — ACTIVITIES OF DAILY LIVING (ADL)
ADLS_ACUITY_SCORE: 0

## 2024-11-08 NOTE — PROGRESS NOTES
"Orthopedic Surgery Progress Note:     L5-S1 Microdiscectomy + Durotomy repair.     11/8/24    Subjective:   No acute events overnight. Pain well-controlled. Tolerating a clear diet. No new concerns or complaints. Had to be straight cathed last night. Was able to urinate this afternoon.   Denies headache with sitting or standing after flat lying.     Objective:   /63 (BP Location: Right arm)   Pulse 85   Temp 97.9  F (36.6  C) (Oral)   Resp 16   Ht 1.918 m (6' 3.5\")   Wt 135.9 kg (299 lb 9.7 oz)   SpO2 92%   BMI 36.95 kg/m    I/O this shift:  In: -   Out: 675 [Urine:675]  General: NAD. Resting comfortably in bed.  Respiratory: Breathing comfortably on RA.  Musculoskeletal: Clean dry bandage.       Motor Strength Right Left   Hip flexion: L1, L2, L3 5/5 5/5   Hip adduction: L2, L3 5/5 5/5   Knee flexion: S1 5/5 5/5   Knee extension: L3, L4 5/5 5/5   Ankle dosiflexion: L4, L5 5/5 5/5   EHL: L5 5/5 5/5   Ankle plantarflexion: S1 5/5 5/5     Sensation from L1-S2 is preserved.    Laboratory Data:  Lab Results   Component Value Date    WBC 6.4 11/04/2024    HGB 15.5 11/04/2024     11/04/2024       Images:  No new images were obtained.None required    Assessment & Plan:   Rogelio Noel is a 34 year old male with PMH including Lumbar disc herniation now s/p above procedure on 11/7/2024 with Dr. Hebert.     Patient had a dural tear that was underwent primary repair without intra-op leaking. Patient tolerated sitting up without positional HA, completed PT and is able to urinate.  No signs or symptoms of CSF leak. Patient prefers discharge today, and is safe, so I will discharge him after a 3rd PVR within normal limits. Educated patient on how anesthesia and opioids can cause urinary retention. Recommend he focus on acetaminophen and ibuprofen use for management at home.      Emilee Primary  Activity: Up with assist until independent No excessive bending or twisting. No lifting >10 lbs x 6 " weeks. No Fermin lift for transfers. Dural tear protocol.   Weight bearing status: WBAT.  Pain management: Avoid PO narcotics, use only as needed after PO acetaminophen and ibuprofen.    Antibiotics: Antibtioics x 24 hours.  Diet: Begin with clear fluids and progress diet as tolerated.   DVT prophylaxis: SCDs only. No chemical DVT ppx needed.  Imaging: None   Labs: Hgb POD 1  Bracing/Splinting: None.  Dressings: Keep anticoat dressing c/d/i x 14 days.  Drains: None  Shipman catheter: Remove POD#1.   Physical Therapy/Occupational Therapy: Eval and treat  Cultures: none.    Consults: Hospitalist  Follow-up: Clinic with Dr. Hebert in 6 weeks with repeat x-rays.   Disposition: Pending progress with therapies, pain control on orals, and medical stability, anticipate discharge to Home on POD #1    JM Hopkins

## 2024-11-08 NOTE — PLAN OF CARE
"/70 (BP Location: Right arm)   Pulse 97   Temp 97.2  F (36.2  C) (Axillary)   Resp 16   Ht 1.918 m (6' 3.5\")   Wt 135.9 kg (299 lb 9.7 oz)   SpO2 94%   BMI 36.95 kg/m     No acute events overnight  Urinary retention.Bladder scan 450. Straight cath 500ml  Intermittent numbness and tingling to BLE . Ortho aware .   Pt slept throughout . Drowsy Patient is alert and oriented x 4. VSS. RA. Pain well controlled .  Post op nausea. Antiemetics given . PIV x 2 to the  Left infusing cont NS .   No drains.   NOOB yet . On flat bedrest precautions.     "

## 2024-11-08 NOTE — PROGRESS NOTES
"Orthopedic Surgery Progress Note:     L5-S1 Microdiscectomy + Durotomy repair.     11/7/24    Subjective:   No acute events overnight. Pain well-controlled. Tolerating a clear diet. No new concerns or complaints. Had to be straight cathed last night.     Objective:   /63 (BP Location: Right arm)   Pulse 85   Temp 97.9  F (36.6  C) (Oral)   Resp 16   Ht 1.918 m (6' 3.5\")   Wt 135.9 kg (299 lb 9.7 oz)   SpO2 92%   BMI 36.95 kg/m    No intake/output data recorded.  General: NAD. Resting comfortably in bed.  Respiratory: Breathing comfortably on RA.  Drain Output: None  Musculoskeletal: Clean dry bandage.       Motor Strength Right Left   Hip flexion: L1, L2, L3 5/5 5/5   Hip adduction: L2, L3 5/5 5/5   Knee flexion: S1 5/5 5/5   Knee extension: L3, L4 5/5 5/5   Ankle dosiflexion: L4, L5 5/5 5/5   EHL: L5 5/5 5/5   Ankle plantarflexion: S1 5/5 5/5     Sensation from L1-S2 is preserved.    Laboratory Data:  Lab Results   Component Value Date    WBC 6.4 11/04/2024    HGB 15.5 11/04/2024     11/04/2024       Images:  No new images were obtained.None required    Assessment & Plan:   Rogelio Noel is a 34 year old male with PMH including Lumbar disc herniation now s/p above procedure on 11/7/2024 with Dr. Hebert.     Patient had a dural tear that was underwent primary repair without intra-op leaking. Patient to start lying at 45 degrees followed by 65 degrees at 1 hour intervals. If no signs or symptoms of CSF leak patient may be discharged today from [facility]. No intraoperative event could really explain his need for being straight cathed. Most likely a drug related interaction. Will monitor this.     If no signs of dural tear may be discharged from [facility].      Emilee Primary  Activity: Up with assist until independent No excessive bending or twisting. No lifting >10 lbs x 6 weeks. No Fermin lift for transfers. Dural tear protocol.   Weight bearing status: WBAT.  Pain management:   Transition from " IV to PO narcotics as tolerated.   Antibiotics: Antibtioics x 24 hours.  Diet: Begin with clear fluids and progress diet as tolerated.   DVT prophylaxis: SCDs only. No chemical DVT ppx needed.  Imaging: None   Labs: Hgb POD 1  Bracing/Splinting: None.  Dressings: Keep Aquacel c/d/i x 7 days.  Drains: None  Shipman catheter: Remove POD#1.   Physical Therapy/Occupational Therapy: Eval and treat  Cultures: none.    Consults: Hospitalist  Follow-up: Clinic with Dr. Hebert in 6 weeks with repeat x-rays.   Disposition: Pending progress with therapies, pain control on orals, and medical stability, anticipate discharge to Home on POD #2-3.    Otf Ewing MD  Spine Fellow     PLEASE PAGE ME directly with any questions/concerns during regular weekday hours before 5 pm. If there is no response, if it is a weekend, or if it is during evening hours then please page the orthopedic surgery resident on call.    FOLLOWUP:    Future Appointments   Date Time Provider Department [facility]   11/8/2024  8:00 PM Ronnell Aguilar, OTR [facility]   12/16/2024  4:00 PM Onel Hebert MD [facility]   2/4/2025 11:45 AM Armaan Sanchez MD [facility]

## 2024-11-08 NOTE — PLAN OF CARE
5 MS Admission Note    Reason for admission: Post op Lumbar  Primary team notified of pt arrival.  Admitted from: PACU  Via: Bed  Accompanied by: PACU nurses  Belongings: Placed in closet; valuables sent home with family  Admission Required Doc Completed: yes/no  Teaching: Orientation to unit and call light- call light within reach, use of console, meal times, when to call for the RN, and enforced importance of safety.  IV Access: 2 L .PIVs  Telemetry: No  Ht./Wt.: Completed  Code Status verified on armband: Yes  2 RN Skin Assessment Completed with: Kely BLUM  Suction/Ambu bag/Flowmeter at bedside: Yes    Pt status: Drowsy , Nauseous.

## 2024-11-08 NOTE — ANESTHESIA CARE TRANSFER NOTE
Patient: Rogelio Noel    Procedure: Procedure(s):  LEFT LUMBAR 5-SACRAL 1 MICRODISCECTOMY, SPINE, repair of durotomy.       Diagnosis: Osteoarthritis of spine with radiculopathy, lumbosacral region [M47.27]  Lumbar disc herniation with radiculopathy [M51.16]  Diagnosis Additional Information: No value filed.    Anesthesia Type:   General     Note:    Oropharynx: oropharynx clear of all foreign objects and spontaneously breathing  Level of Consciousness: drowsy  Oxygen Supplementation: face mask  Level of Supplemental Oxygen (L/min / FiO2): 8  Independent Airway: airway patency satisfactory and stable  Dentition: dentition unchanged  Vital Signs Stable: post-procedure vital signs reviewed and stable  Report to RN Given: handoff report given  Patient transferred to: PACU    Handoff Report: Identifed the Patient, Identified the Reponsible Provider, Reviewed the pertinent medical history, Discussed the surgical course, Reviewed Intra-OP anesthesia mangement and issues during anesthesia, Set expectations for post-procedure period and Allowed opportunity for questions and acknowledgement of understanding      Vitals:  Vitals Value Taken Time   /76    Temp 36.3  C (97.3  F) 11/07/24 1930   Pulse 85 11/07/24 1939   Resp 14 11/07/24 1939   SpO2 97 % 11/07/24 1939   Vitals shown include unfiled device data.    Electronically Signed By: JM George CRNA  November 7, 2024  7:40 PM

## 2024-11-08 NOTE — PROGRESS NOTES
11/08/24 1157   Appointment Info   Signing Clinician's Name / Credentials (PT) Jake Gandhi, PT, DPT   Rehab Comments (PT) spinal precautions   Living Environment   People in Home spouse   Current Living Arrangements house   Home Accessibility stairs within home   Number of Stairs, Within Home, Primary greater than 10 stairs   Stair Railings, Within Home, Primary railings on both sides of stairs   Transportation Anticipated family or friend will provide   Living Environment Comments Patient lives in house with spouse, has ramped entry. full flight of stairs to get downstairs with bilateral handrails. Walk-in shower with grab bar.   Self-Care   Usual Activity Tolerance good   Current Activity Tolerance moderate   Equipment Currently Used at Home none   Fall history within last six months no   Activity/Exercise/Self-Care Comment Patient reported being IND with mobility and cares at baseline with no assistive device. Spouse was helping with donning/doffing socks.   General Information   Onset of Illness/Injury or Date of Surgery 11/07/24   Referring Physician Otf Campbell MD   Patient/Family Therapy Goals Statement (PT) Patient would like to go home today   Pertinent History of Current Problem (include personal factors and/or comorbidities that impact the POC) 34 year old male with PMH including Lumbar disc herniation now s/p Left L5-S1 microdiscectomy and repair of durotomy on 11/7/2024 with Dr. Hebert.   Existing Precautions/Restrictions fall;spinal   General Observations dural tear protocol in AM   Cognition   Affect/Mental Status (Cognition) WFL   Orientation Status (Cognition) oriented x 4   Follows Commands (Cognition) WFL   Pain Assessment   Patient Currently in Pain Yes, see Vital Sign flowsheet   Range of Motion (ROM)   ROM Comment post-surgical spinal precautions   Strength (Manual Muscle Testing)   Strength Comments impaired LLE strength at baseline   Bed Mobility   Comment, (Bed Mobility)  supine<>sit transfers with supervision, HOB slightly elevated, and use of bedrail.   Transfers   Comment, (Transfers) sit<>stands with cane and SBA   Gait/Stairs (Locomotion)   Comment, (Gait/Stairs) ambulation with cane and initial CGA   Balance   Balance Comments requires UE support on cane at this time.   Sensory Examination   Sensory Perception patient reports no sensory changes   Clinical Impression   Criteria for Skilled Therapeutic Intervention Yes, treatment indicated   PT Diagnosis (PT) impaired mobility   Influenced by the following impairments LE weakness, post-surgical spinal precautions, decreased activity tolerance, increased pain levels   Functional limitations due to impairments impaired functional mobility, impaired gait, transfers, stair navigation   Clinical Presentation (PT Evaluation Complexity) stable   Clinical Presentation Rationale clinical judgment   Clinical Decision Making (Complexity) low complexity   Planned Therapy Interventions (PT) balance training;bed mobility training;gait training;neuromuscular re-education;patient/family education;stair training;strengthening;transfer training;progressive activity/exercise   Risk & Benefits of therapy have been explained evaluation/treatment results reviewed;care plan/treatment goals reviewed;risks/benefits reviewed;current/potential barriers reviewed;participants included;participants voiced agreement with care plan;patient   PT Total Evaluation Time   PT Eval, Low Complexity Minutes (46326) 9   Physical Therapy Goals   PT Frequency One time eval and treatment only   PT Predicted Duration/Target Date for Goal Attainment 11/09/24   PT Goals Transfers;Bed Mobility;Gait;Stairs   PT: Bed Mobility Supervision/stand-by assist;Supine to/from sit   PT: Transfers Supervision/stand-by assist;Sit to/from stand;Assistive device   PT: Gait Supervision/stand-by assist;Straight cane;150 feet   PT: Stairs Supervision/stand-by assist;3 stairs;Rail on  right;Assistive device   Interventions   Interventions Quick Adds Gait Training;Therapeutic Activity   Therapeutic Activity   Therapeutic Activities: dynamic activities to improve functional performance Minutes (99629) 14   Symptoms Noted During/After Treatment Fatigue;Increased pain   Treatment Detail/Skilled Intervention Patient supine in bed at beginning of session, agreeable to participate in PT. Educated on spinal precautions and log roll technique for bed mobility. Time during session for room set-up and equipment retrieval. Provided patient with proper posture and body movement handout and briefly educated on material. Performing supine<>sit tranfsers with SBA, HOB slightly elevated, and use of bedrail. Verbal cues for sequencing log roll technique. Discussed youtube video highlighting log roll technique for bed mobility which patient found on phone to show spouse. Patient performing sit<>stands during session with SBA and cane, progressed to supervision with cane. Cues for hand placement and maintaining spinal precautions. Performing sit<>stand from toilet with cane, grab bar, and supervision. Cues for hand placement. Voiding into urinal in standing with supervision and unilateral UE support. Performing slowly but safe throughout. PAtient agreeable to sit in recliner at end of session. Seated with call light next to him and all needs within reach.   Gait Training   Gait Training Minutes (31036) 12   Symptoms Noted During/After Treatment (Gait Training) fatigue;increased pain   Treatment Detail/Skilled Intervention Patient completed bout of ambulation for 175' with single endpoint cane, CGA, and unilateral UE support on IV pole. Progressed to only unilateral UE support on cane and supervision by end of bout. Initial decreased step length and gait velocity which improved with practice. No instability or overt LOB throughout. Cues for sequencing cane and stepping as needed. Completed bout of stair navigation x4  steps with unilateral cane, handrail, and initial CGA, progressed to supervision. Cues for sequencing cane with stair navigation, step to pattern throughout.   Distance in Feet 175', 4 stairs   PT Discharge Planning   PT Plan discharge from IP PT   PT Discharge Recommendation (DC Rec) home with assist   PT Rationale for DC Rec Patient below baseline functional mobility but mobilizing well during session with supervision and cane. Lives in accessible house with spouse. Anticipate discharge home with supervision and assist from spouse as needed for initial mobility and cares when medically cleared.   PT Brief overview of current status supervision for mobility with cane   Physical Therapy Time and Intention   Timed Code Treatment Minutes 26   Total Session Time (sum of timed and untimed services) 35

## 2024-11-08 NOTE — PHARMACY-ADMISSION MEDICATION HISTORY
Pharmacist Admission Medication History    Admission medication history is complete. The information provided in this note is only as accurate as the sources available at the time of the update.    Information Source(s): Patient via in-person    Pertinent Information: None    Changes made to PTA medication list:  Added: None  Deleted: None  Changed: None    Allergies reviewed with patient and updates made in EHR: yes    Medication History Completed By: Laure Jennings RPH 11/8/2024 9:56 AM    PTA Med List   Medication Sig Last Dose/Taking    acetaminophen (TYLENOL) 325 MG tablet Take 2 tablets (650 mg) by mouth every 8 hours as needed for mild pain. Taking As Needed    gabapentin (NEURONTIN) 300 MG capsule Take 1 capsule (300 mg) by mouth 3 times daily. 11/6/2024 Bedtime    ibuprofen (ADVIL/MOTRIN) 400 MG tablet Take 800 mg by mouth every 6 hours as needed for moderate pain Usually takes two. 11/6/2024    lisinopril (ZESTRIL) 20 MG tablet Take 1 tablet (20 mg) by mouth daily (Patient taking differently: Take 20 mg by mouth every morning.) 11/6/2024 Morning    methocarbamol (ROBAXIN) 500 MG tablet Take 1 tablet (500 mg) by mouth 4 times daily as needed for muscle spasms. Taking As Needed    oxyCODONE (ROXICODONE) 5 MG tablet Take 1 tablet (5 mg) by mouth every 6 hours as needed for pain. Taking As Needed    venlafaxine (EFFEXOR XR) 75 MG 24 hr capsule Take 75 mg by mouth every morning. 11/6/2024 Morning

## 2024-11-08 NOTE — PROGRESS NOTES
"PACU to Inpatient Nursing Handoff    Patient Rogelio Noel is a 34 year old male who speaks English.   Procedure Procedure(s):  LEFT LUMBAR 5-SACRAL 1 MICRODISCECTOMY, SPINE, repair of durotomy.   Surgeon(s) Primary: Onel Hebert MD  Fellow - Assisting: Otf Campbell MD     No Known Allergies    Isolation  No active isolations     Past Medical History   has no past medical history on file.    Anesthesia General   Dermatome Level     Preop Meds acetaminophen (Tylenol) - time given: 1206  celecoxib (Celebrex) - time given: 1206  TSA - time given: 1206   Nerve block Not applicable   Intraop Meds dexamethasone (Decadron)  dexmedetomidine (Precedex): 20 mcg total  fentanyl (Sublimaze): 100 mcg total  hydromorphone (Dilaudid): 1 mg total  ketamine (Ketalar): 40 mg given  ondansetron (Zofran): last given at 1840   Local Meds Yes   Antibiotics cefazolin (Ancef) - last given at 1610     Pain Patient Currently in Pain: yes- minimal   PACU meds  fentanyl (Sublimaze): 50 mcg (total dose) last given at 1947   Dilaudid 0.2 mg @ 2020  Tylenol 975mg and gabapentin 300 mg @ 2045  Zofran 4 mg @ 2050   PCA / epidural No   Capnography     Telemetry ECG Rhythm: Normal sinus rhythm   Inpatient Telemetry Monitor Ordered? No        Labs Glucose Lab Results   Component Value Date     11/04/2024     11/24/2021    GLC 96 02/11/2020       Hgb Lab Results   Component Value Date    HGB 15.5 11/04/2024       INR No results found for: \"INR\"   PACU Imaging Not applicable     Wound/Incision Incision/Surgical Site 11/07/24 Posterior;Left Lumbar spine (Active)   Incision Assessment UTV 11/07/24 2018   Closure Sutures;Liquid bandage 11/07/24 1653   Dressing Intervention Clean, dry, intact 11/07/24 2018   Number of days: 0      CMS     BLE numbness and tingling- see flowsheet. Dr Gildardo Posada notified, team will continue to follow up   Equipment ice pack   Other LDA       IV Access Peripheral IV 11/07/24 Anterior;Left " Upper forearm (Active)   Site Assessment [facility] 11/07/24 2018   Line Status Saline locked 11/07/24 2018   Dressing Transparent 11/07/24 2018   Dressing Status clean;dry;intact 11/07/24 2018   Phlebitis Scale 0-->no symptoms 11/07/24 2018   Infiltration? no 11/07/24 2018   Number of days: 0       Peripheral IV 11/07/24 Left Wrist (Active)   Site Assessment [facility] 11/07/24 2018   Line Status Infusing 11/07/24 2018   Dressing Transparent 11/07/24 2018   Dressing Status clean;dry;intact 11/07/24 2018   Phlebitis Scale 0-->no symptoms 11/07/24 2018   Infiltration? no 11/07/24 2018   Number of days: 0      Blood Products Not applicable EBL minimal mL   Intake/Output Date 11/07/24 0700 - 11/08/24 0659   Shift 3566-2997 2063-5693 8946-9004 24 Hour Total   INTAKE   I.V.  1000  1000   Shift Total(mL/kg)  1000(7.36)  1000(7.36)   OUTPUT   Blood  34  34   Shift Total(mL/kg)  34(0.25)  34(0.25)   Weight (kg) 135.9 135.9 135.9 135.9      Drains / Shipman     Time of void PreOp Time of Void Prior to Procedure: 1158 (11/07/24 1157)    PostOp  No urgency to void- minimal bladder volume    Diapered? No   Bladder Scan Bladder Scan Volume (mL): 140 ml (11/07/24 1900)   PO   200 mL Water, popsicle and crackers; some nausea- treated with zofran     Vitals    B/P: 132/69  T: 97.3  F (36.3  C)    Temp src: Temporal  P:  Pulse: 83 (11/07/24 2045)          R: 13  O2:  SpO2: 95 %    O2 Device: Nasal cannula (11/07/24 2018)    Oxygen Delivery: 2 LPM (11/07/24 2018)         Family/support present significant other   Patient belongings  Bags x 2   Patient transported on bed   DC meds/scripts (obs/outpt) Not applicable   Inpatient Pain Meds Released? Yes       Special needs/considerations HOB flat d/t dural tear during surgery   Tasks needing completion Emilee Primary  Activity: Up with assist until independent No excessive bending or twisting. No lifting >10 lbs x 6 weeks. No Ferimn lift for transfers. Dural tear protocol.   Weight bearing status:  WBAT.  Pain management:   Transition from IV to PO narcotics as tolerated.   Antibiotics: Antibtioics x 24 hours.  Diet: Begin with clear fluids and progress diet as tolerated.   DVT prophylaxis: SCDs only. No chemical DVT ppx needed.  Imaging: None   Labs: Hgb POD 1  Bracing/Splinting: None.  Dressings: Keep Aquacel c/d/i x 7 days.  Drains: None  Shipman catheter: Remove POD#1.   Physical Therapy/Occupational Therapy: Eval and treat  Cultures: none.    Consults: Hospitalist  Follow-up: Clinic with Dr. Hebert in 6 weeks with repeat x-rays.   Disposition: Pending progress with therapies, pain control on orals, and medical stability, anticipate discharge to Home on POD #2-3.        Yesenia ACEVES

## 2024-11-08 NOTE — OR NURSING
Patient reports taking up to 600 mg gabapentin three times daily leading up to surgery. Dr Gildardo Posada notified; order received to increase dose to 300 mg.

## 2024-11-08 NOTE — CARE PLAN
Pt. discharged at approximately 1745 to home, and left with personal belongings. Pt. received complete discharge paperwork and discharge medications as filled by discharge pharmacy. Pt received and signed for the narcotic medication.Discharge teaching included  medication, pain management, activity restrictions, dressing changes, and signs and symptoms of infection. Pt. had no further questions at the time of discharge and no unmet needs were identified. Both PIVs removed.

## 2024-11-08 NOTE — PLAN OF CARE
Physical Therapy Discharge Summary    Reason for therapy discharge:    Discharged to home with assist.    Progress towards therapy goal(s). See goals on Care Plan in [facility] electronic health record for goal details.  Goals met    Therapy recommendation(s):    No further therapy is recommended.

## 2024-11-08 NOTE — PLAN OF CARE
Problem: Adult Inpatient Plan of Care  Goal: Readiness for Transition of Care  Outcome: Met   Goal Outcome Evaluation:    VS: Temp: 97.9  F (36.6  C) Temp src: Oral BP: 122/63 Pulse: 85   Resp: 16 SpO2: 92 %    O2: Stable on RA, sating <90%    Output: Pt unable to void this morning, voided with PT at 1200, PVR 30mL, 2nd PVR 5mL.    Last BM: unknown   Activity: SBA    Skin: Spinal incision    Pain: Rates 2-4, given PRN robaxin and dilaudid.    CMS: A&O x4, calm and cooperative with care    Dressing: To spinal incision- CDI    Diet: Regular- good appetite.    LDA: 2x LUE PIV, both DCI, flushed with NS    Equipment: IV pole and pump, urinal, GB    Plan: Pending discharge home today    Additional Info: On dural tear protocol, no headache during shift.

## 2024-11-08 NOTE — PLAN OF CARE
Occupational Therapy: Orders received. Chart reviewed and discussed with care team.? Occupational Therapy not indicated as the patient mobilizing well with PT. No OT concerns at this time and will have assist as needed from spouse.? Defer discharge recommendations to PT/care team.? Will complete orders.

## 2024-11-08 NOTE — BRIEF OP NOTE
Brief Operative Note    Preop Dx:   Osteoarthritis of spine with radiculopathy, lumbosacral region [M47.27]  Lumbar disc herniation with radiculopathy [M51.16]  Post op Dx:   Same  Procedure:    Procedure(s):  LEFT LUMBAR 5-SACRAL 1 MICRODISCECTOMY, SPINE, repair of durotomy.  Surgeon:     Onel Hebert MD   Assistants:    Otf Campbell MD   Anesthesia:   General  EBL:    35ml  Total IV Fluids:  See Anesthesia Record  Specimens:   None  Findings:   See Operative Dictation      Assessment and Plan: Rogelio Noel is a 34 year old male with PMH including Lumbar disc herniation now s/p above procedure on 11/7/2024 with Dr. Hebert.     [facility] Primary  Activity: Up with assist until independent No excessive bending or twisting. No lifting >10 lbs x 6 weeks. No Fermin lift for transfers. Dural tear protocol.   Weight bearing status: WBAT.  Pain management:   Transition from IV to PO narcotics as tolerated.   Antibiotics: Antibtioics x 24 hours.  Diet: Begin with clear fluids and progress diet as tolerated.   DVT prophylaxis: SCDs only. No chemical DVT ppx needed.  Imaging: None   Labs: Hgb POD 1  Bracing/Splinting: None.  Dressings: Keep Aquacel c/d/i x 7 days.  Drains: None  Shipman catheter: Remove POD#1.   Physical Therapy/Occupational Therapy: Eval and treat  Cultures: none.    Consults: Hospitalist  Follow-up: Clinic with Dr. Hebert in 6 weeks with repeat x-rays.   Disposition: Pending progress with therapies, pain control on orals, and medical stability, anticipate discharge to Home on POD #2-3.    Indications for assistant:  I assisted in positioning, exposure, retraction, instrumentation, hemostasis, and wound closure allowing for reduction in anesthesia time and blood loss.     Otf Campbell MD  Spine Fellow    Please page me  with any questions/concerns during regular weekday hours before 4pm. If there is no response, if it is a weekend, or if it is during evening hours then please  page the orthopaedic surgery resident on call.

## 2024-11-08 NOTE — PROGRESS NOTES
[facility]    Medicine Consult Progress Note - Hospitalist Service, GOLD TEAM 22    Date of Admission:  11/7/2024    Assessment & Plan   Rogelio Noel is a 34 year old male admitted on 11/7/2024.  Medical history notable for back pain, severe radiculopathy, hypertension, anxiety.  Admitted by orthopedics for L5-S1 microdiscectomy     #S/p left L5-S1 microdiscectomy  #Back pain, severe radiculopathy  Surgery by Dr. Onel Hebert.    -Management as per orthopedics  -Activity: Up with assist until independent No excessive bending or twisting. No lifting >10 lbs x 6 weeks. No Fermin lift for transfers. Dural tear protocol.   -Weight bearing status: WBAT.  -Pain management: Transition from IV to PO narcotics as tolerated.   -Antibiotics: Antibtioics x 24 hours.  -Diet: Begin with clear fluids and progress diet as tolerated.   -DVT prophylaxis: SCDs only. No chemical DVT ppx needed.  -Imaging: None   -Labs: Hgb POD 1  -Bracing/Splinting: None.  -Dressings: Keep Aquacel c/d/i x 7 days.  -Drains: None  -Shipman catheter: Remove POD#1.   -Physical Therapy/Occupational Therapy: Eval and treat  -Cultures: none.    -Follow-up: Clinic with Dr. Hebert in 6 weeks with repeat x-rays.   -Disposition: Pending progress with therapies, pain control on orals, and medical stability, anticipate discharge to Home on POD #2-3.  -Resumed PTA gabapentin     # urinary retention:  Secondary to anesthesia/pain meds.  Discussed with ortho, and received a dose of flomax.  Continue to monitor.    #Hypertension  stable  -Will hold lisinopril in postoperative setting  -Can likely resume BP meds prior to discharge once tolerating orals     #Anxiety  -PTA Effexor  -Will hold PTA Ativan to prevent oversedation in postoperative setting, and hasn't been prescribed recently per prescription monitoring program       Clinically Significant Risk Factors Present on Admission                   #  "Hypertension: Home medication list includes antihypertensive(s)           # Obesity: Estimated body mass index is 36.95 kg/m  as calculated from the following:    Height as of this encounter: 1.918 m (6' 3.5\").    Weight as of this encounter: 135.9 kg (299 lb 9.7 oz).                        Delio Rangel MD  Hospitalist Service, GOLD TEAM 22  [facility]  Securely message with [employer] (more info)  Text page via [employer] Paging/Directory   See signed in provider for up to date coverage information  ______________________________________________________________________    Interval History   Pain stable, hoping to discharge, but awaiting resolution of urinary retention.    Physical Exam   Vital Signs: Temp: 97.9  F (36.6  C) Temp src: Oral BP: 122/63 Pulse: 85   Resp: 16 SpO2: 92 % O2 Device: Nasal cannula Oxygen Delivery: 2 LPM  Weight: 299 lbs 9.68 oz    Gen: NAD  HEENT: EOMI, PERRL, MMM  CV: extremities warm and well perfused  Resp: breathing comfortably on RA  : deferred  Msk: no LE edema  Skin: no rashes  Neuro: nonfocal exam     "

## 2024-11-08 NOTE — CONSULTS
[facility]  Consult Note - Hospitalist Service, GOLD TEAM   Date of Admission:  11/7/2024  Consult Requested by: Otf Campbell MD  Reason for Consult: Medical comanagement    Assessment & Plan   Rogelio LYLES Sadie is a 34 year old male admitted on 11/7/2024.  Medical history notable for back pain, severe radiculopathy, hypertension, anxiety.  Admitted by orthopedics for L5-S1 microdiscectomy    #S/p left L5-S1 microdiscectomy  #Back pain, severe radiculopathy  Surgery by Dr. Onel Hebert.    -Management as per orthopedics  -Activity: Up with assist until independent No excessive bending or twisting. No lifting >10 lbs x 6 weeks. No Fermin lift for transfers. Dural tear protocol.   -Weight bearing status: WBAT.  -Pain management: Transition from IV to PO narcotics as tolerated.   -Antibiotics: Antibtioics x 24 hours.  -Diet: Begin with clear fluids and progress diet as tolerated.   -DVT prophylaxis: SCDs only. No chemical DVT ppx needed.  -Imaging: None   -Labs: Hgb POD 1  -Bracing/Splinting: None.  -Dressings: Keep Aquacel c/d/i x 7 days.  -Drains: None  -Shipman catheter: Remove POD#1.   -Physical Therapy/Occupational Therapy: Eval and treat  -Cultures: none.    -Follow-up: Clinic with Dr. Hebert in 6 weeks with repeat x-rays.   -Disposition: Pending progress with therapies, pain control on orals, and medical stability, anticipate discharge to Home on POD #2-3.  -Resumed PTA gabapentin    #Hypertension  Blood pressure 125/70 in postoperative setting  -Will hold lisinopril in postoperative setting  -Can likely resume BP meds in a.m. as pressures allow    #Anxiety  -PTA Effexor  -Will initially hold PTA Ativan to prevent oversedation in postoperative setting    Clinically Significant Risk Factors Present on Admission                   # Hypertension: Home medication list includes antihypertensive(s)           # Obesity: Estimated body mass index is 36.95 kg/m   "as calculated from the following:    Height as of this encounter: 1.918 m (6' 3.5\").    Weight as of this encounter: 135.9 kg (299 lb 9.7 oz).              Anastacio Cantu  Hospitalist Service, GOLD TEAM   Securely message with [employer] (more info)  Text page via [facility] Paging/Directory   See signed in provider for up to date coverage information  ______________________________________________________________________    Chief Complaint   Low back pain, L5-S1 microdiscectomy    History is obtained from the patient    History of Present Illness   Rogelio Noel is a 34 year old male admitted on 11/7/2024.  Medical history notable for back pain, severe radiculopathy, hypertension, anxiety.  Admitted by orthopedics for L5-S1 microdiscectomy.  Saw patient on unit.  Overall doing well postoperatively.  Reports currently has 5/10 on a pain.  Reports no other symptoms at this time.  [place] current medications.       Past Medical History    History reviewed. No pertinent past medical history.    Past Surgical History   Past Surgical History:   Procedure Laterality Date    COLONOSCOPY N/A 5/10/2024    Procedure: COLONOSCOPY, FLEXIBLE, WITH LESION REMOVAL USING SNARE;  Surgeon: Collins Palacios MD;  Location: [facility]       Medications   I have reviewed this patient's current medications       Physical Exam   Vital Signs: Temp: 97.5  F (36.4  C) Temp src: Oral BP: 132/81 Pulse: 96   Resp: 16 SpO2: 94 % O2 Device: Nasal cannula Oxygen Delivery: 2 LPM  Weight: 299 lbs 9.68 oz    Exam:  General: Appears stated age, no acute distress  Head: Normocephalic. No masses, lesions, tenderness or abnormalities  Cardiovascular: S1/S2, Normal rate and rhythm   Respiratory: Normal respiratory effort, lung fields clear to auscultation  Gastrointestinal: Bowel sounds normal, no tenderness to palpation   extremities normal- no gross deformities noted, gait normal, and normal muscle tone  Neuropsych: Speaks clearly, answers questions " appropriately, motor/sensory innervation of extremities grossly intact       Medical Decision Making       30 MINUTES SPENT BY ME on the date of service doing chart review, history, exam, documentation & further activities per the note.      Data

## 2024-11-08 NOTE — PROGRESS NOTES
CLINICAL NUTRITION SERVICES - BRIEF NOTE     Nutrition Prescription    RECOMMENDATIONS FOR MDs/PROVIDERS TO ORDER:  None    Malnutrition Status:    Patient does not meet two of the established criteria necessary for diagnosing malnutrition     Recommendations already ordered by Registered Dietitian (RD):  Reji BID per provider order    Future/Additional Recommendations:  Monitor labs, weight trends, intakes and supplement tolerance       REASON FOR ASSESSMENT  Rogelio Noel is a 34 year old male assessed by the dietitian for supplement preference    Reason for admission: s/p L5-S1 Microdiscectomy + Durotomy repair.   PMH: back pain, severe radiculopathy, hypertension, anxiety     Findings    Provider ordered Reji supplements, supplements not on orders in [employer] [facility]. Writer spoke with pt to determine pt's flavor preference for supplement and instructions on how to consume supplement.    Body mass index is 36.95 kg/m .    Wt Readings from Last 10 Encounters:   11/07/24 135.9 kg (299 lb 9.7 oz)   11/04/24 133.8 kg (295 lb)   05/10/24 133.4 kg (294 lb)   05/09/24 133.4 kg (294 lb)   05/01/24 134.7 kg (297 lb)   01/03/24 138.3 kg (305 lb)   11/27/23 134.3 kg (296 lb)   11/09/23 135.6 kg (299 lb)   09/21/23 129.7 kg (286 lb)   06/14/23 131.5 kg (290 lb)       INTERVENTIONS  Implementation    Multi-trace element supplement therapy        Follow up/Monitoring  Progress toward goals will be monitored and evaluated per LOS protocol.    Nydia Gil RDN [facility] ICU RD pager: [phone]   On Call Pager (weekends only): [phone]

## 2024-11-09 NOTE — OP NOTE
Orthopaedic Surgery Op-Note     Patient: Rogelio Noel  : 1990  Date of Service: 2024 12:38 PM    Pre-operative Diagnosis:   Osteoarthritis of spine with radiculopathy, lumbosacral region [M47.27]  Lumbar disc herniation with radiculopathy [M51.16]    Post-operative Diagnosis: SAME    Procedure(s) Performed:   Left L5-S1 hemilaminectomy and microdiscectomy  Dura repair    Staff: Dr. Onel Hebert MD  Resident/Fellow:   Dr. Otf Tucker served as assistant surgeon.  His assistance was necessary for patient positioning, exposure, suctioning and retraction, decompression and closure.  There was no qualified resident available for the procedure.         Implants: n/a  Drains: n/a  Intra-op Labs/Cxs/Specimens:   * No specimens in log *    Indication for Procedure:   Patient is a 34 year old male who has had extensive nonoperative management without adequate symptomatic relief. Patient remains severely disabled due to these symptoms. After appropriate discussion of risks, alternatives and benefits of surgery the patient elected to proceed with surgery.  We discussed the risk of cardiac, pulmonary, and embolic complications which can be life threatening. We also discussed risk of incomplete symptom relief, injury to the nerve roots or spinal cord which can result in permanent sensory or motor function loss, dural injury with spinal fluid leak which can result in headaches and require lumbar drain or additional surgery to address, or persistent pain and require additional surgery to address. No guarantees were given. Patient voluntarily signed written informed consent.       Description of Procedure:   On the day of surgery I met the patient in the preoperative holding area.  We reviewed the surgical plan. I answered the patient's questions to the best of my ability. Site was marked and consent forms were signed by the patient and I.  OR and Anesthesia team were briefed. Patient was taken to the OR  and general anesthesia administered with endotracheal intubation. IV antibiotics and tranexamic acid were administered prior to incision. Patient was positioned in the prone position.  The surgical site was prepped and draped in sterile fashion. Timeout was performed.    Began with C arm in the AP and lateral views to marlo out the L5-S1 disc space as well as the medial border of the pedicle at L5-S1.  Then placed a spinal needle just medial to the facet joint confirmed with fluoroscopy and inject local anesthetic.  Wire was placed through the 18-gauge needle and the needle then where moved.  Then made a 1 cm incision centered on the guidewire.  Then used dilators dilating down just medial to the facet joint until I could feel the resistance of ligamentum flavum.  Again confirmed position of the dilators on fluoroscopy.  After the dilators were appropriately docked by slight a slight a working cannula over the dilators.  Then used direct illumination and magnification through an endoscope to view ligamentum flavum.  I punch to ligamentum flavum placed in the working cannula in the epidural space.  I then began working through the cannula resecting the ligamentum flavum with biters and a Kerrison.  The ligamentum flavum was resected and I turned the cannula to visualize the lateral recess.  While working into the disc space identifiable was concerning for a durotomy.  Given this I elected to open and direct visualize through loupe magnification.    I used a scalpel to create a 5 cm incision centered over the L5-S1 disc space.  Used a Bovie to dissect down the left side of the spinous process exposing the caudal aspect of the L5 lamina as well as the superior or cephalad aspect of the S1 lamina.  Then used a high-speed bur to perform a hemilaminotomy of L5 on the left side.  Resected additional ligamentum flavum and performed a partial medial facetectomy to expose the shoulder of the traversing nerve root as well as  the exiting nerve root.  Under loupe magnification I examined the thecal sac within the axilla at L5-S1 and identified a durotomy that had intact arachnoid there is no active CSF leakage.  Although the arachnoid was intact I felt it was best to perform a direct repair of the dura.  Given the arachnoid was intact I elected to proceed with the discectomy first as there was considerable ventral compression which would make repair more challenging.  I placed a piece of Gelfoam and a small cottonoid over the durotomy to protect it during the annulotomy.    I then carefully mobilized the shoulder of the S1 nerve root in the central thecal sac medially and protected this with a D'Erico retractor.  Then used a scalpel to create an a longitudinal annulotomy.  I then used a nerve hook and downgoing microcurette to free the herniated disc material from under the annulus and the dorsal aspect of the vertebral body at L5 and S1.  The disc herniation was removed largely en bloc with a few smaller pieces that came out piecemeal.  I again palpated into the annulus defect and did not identify any additional disc herniation.  I palpated ventral to the exiting and traversing nerve root confirming decompression of the L5-S1 disc space.  I used 6-0 Riverside-Twin suture and performed a direct side-to-side repair of the durotomy and then sewed down a small piece of DuraGen over my repair.  There was no evidence of spinal fluid leak after the repair.  I subsequently applied DuraSeal exact over the repair.    Wound was thoroughly irrigated with sterile saline throughout the case. Hemostasis was achieved with bipolar electrocautery and surgiflo/cottonoids.  0.5 g vancomycin powder was placed in the wound. The fascia was closed in watertight fashion using #1 vicryl. Subcutaneous tissue and dermis were closed with 2-0 vicryl. Skin was closed with running subcuticular monocryl. A Prineo dressing was applied. Sterile Acticoat bandage was applied.  Drapes were removed and patient transferred to the hospital cart. Patient emerged from general anesthesia and was extubated. They were taken to the PACU in stable condition.     All sponge and needle counts were correct x 2.  I was present and scrubbed for the entire procedure.     Post op plan:   Ortho Spine Primary team  Activity: Will have patient on flat bedrest as precaution for the dural repair overnight with plan to mobilize in the morning.  Weight bearing status: No lifting greater than 10 lbs. .  Pain management: Oral pain medications with IV for breakthrough. Wean IV as able.    Antibiotics: Ancef x 24 hours  Diet: Begin with clear fluids and progress diet as tolerated. Reji nutritional supplement twice daily  DVT prophylaxis: Mechanical prophylaxis with SCD  Imaging: None necessary  Labs: None necessary  Bracing/Splinting: None.   Dressings: Keep clean, dry and intact. Patient may shower with bandage in place. Dressing to be removed 2 weeks after surgery. Please change or reinforce as necessary for strikethrough or saturation.   Drains: N/A  Physical Therapy/Occupational Therapy: Eval and treat.  Consults: IM, BELEN.  Follow-up: Clinic 6 weeks  Disposition: Likely discharge postop day 1    Onel Hebert MD  Orthopedic Spine Surgeon  , Department of Orthopedic Surgery

## 2024-11-11 NOTE — DISCHARGE SUMMARY
ORTHOPEDIC SURGERY DISCHARGE SUMMARY     Date of Admission: 11/7/2024  Date of Discharge: 11/8/2024  5:59 PM  Disposition: Home  Staff Physician: Onel Hebert MD  Primary Care Provider: Mitesh Bai    DISCHARGE DIAGNOSIS:  Osteoarthritis of spine with radiculopathy, lumbosacral region [M47.27]  Lumbar disc herniation with radiculopathy [M51.16]    PROCEDURES: Procedure(s):  LEFT LUMBAR 5-SACRAL 1 MICRODISCECTOMY, SPINE, repair of durotomy.  on 11/7/2024    BRIEF HISTORY:  This is a 34 year old patient who has been followed in clinic. Please refer to that documentation for full details. Briefly, the patient has Lumbar radiculopathy. The patient has failed conservative treatment of symptoms and desires more definitive intervention. Therefore, after reviewing non-operative and operative options including the risks and benefits associated with each, the patient elected to proceed with the above stated procedure.     HOSPITAL COURSE:    The patient was admitted following the above listed procedures for pain control and rehabilitation. Rogelio Noel did well post-operatively. Medicine was consulted post operatively to aid in management of medical co-morbidities. The patient received routine nursing cares and at the time of discharge was medically stable. Vital signs were stable throughout admission. The patient is tolerating a regular diet and is voiding spontaneously. All PT/OT goals have been met for safe mobility. Pain is now controlled on oral medications which will be available on discharge. Stool softeners have been used while taking pain medications to help prevent constipation. Rogelio Noel is deemed medically safe to discharge.     Antibiotics:  Ancef given periop and 24 hours postop.  DVT prophylaxis:  Ambulation  PT Progress:  Has met PT/OT goals for safe mobility.   Pain Meds:  Weaned off all IV pain meds by discharge.  Inpatient Events:  No significant postoperative events or complications.      PHYSICAL EXAM:    General: NAD. Resting comfortably in bed.  Respiratory: Breathing comfortably on RA.  Drain Output: None  Musculoskeletal: Clean dry bandage.         Motor Strength Right Left   Hip flexion: L1, L2, L3 5/5 5/5   Hip adduction: L2, L3 5/5 5/5   Knee flexion: S1 5/5 5/5   Knee extension: L3, L4 5/5 5/5   Ankle dosiflexion: L4, L5 5/5 5/5   EHL: L5 5/5 5/5   Ankle plantarflexion: S1 5/5 5/5      Sensation from L1-S2 is preserved.    FOLLOWUP:        Future Appointments   Date Time Provider Department Center   12/16/2024  4:00 PM Onel Hebert MD [facility]   2/4/2025 11:45 AM Armaan Sanchez MD [facility]       Orthopedic Surgery appointments are at the [facility] ([address] [place] [address]). Call [phone] to schedule a follow-up appointment at this location with your provider.     PLANNED DISCHARGE ORDERS:      Discharge Medication List as of 11/8/2024  4:36 PM        START taking these medications    Details   !! acetaminophen (TYLENOL) 325 MG tablet Take 2 tablets (650 mg) by mouth every 8 hours as needed for mild pain., Disp-20 tablet, R-0, E-Prescribe      HYDROmorphone (DILAUDID) 2 MG tablet Take 1-2 tablets (2-4 mg) by mouth every 4 hours as needed for moderate to severe pain., Disp-12 tablet, R-0, E-Prescribe      Nutritional Supplements (BESS) PACK Take 1 packet by mouth 2 times daily (with meals) for 13 days., Disp-26 packet, R-0, E-Prescribe      polyethylene glycol (MIRALAX) 17 GM/Dose powder Take 17 g by mouth daily., OTC      senna-docusate (SENOKOT-S/PERICOLACE) 8.6-50 MG tablet Take 1 tablet by mouth at bedtime., OTC       !! - Potential duplicate medications found. Please discuss with provider.        CONTINUE these medications which have CHANGED    Details   methocarbamol (ROBAXIN) 500 MG tablet Take 1 tablet (500 mg) by mouth 4 times daily as needed for muscle spasms., Disp-15 tablet, R-0, E-Prescribe           CONTINUE  "these medications which have NOT CHANGED    Details   gabapentin (NEURONTIN) 300 MG capsule Take 1 capsule (300 mg) by mouth 3 times daily., Disp-270 capsule, R-0, E-Prescribe      ibuprofen (ADVIL/MOTRIN) 400 MG tablet Take 800 mg by mouth every 6 hours as needed for moderate pain Usually takes two., Historical      lisinopril (ZESTRIL) 20 MG tablet Take 1 tablet (20 mg) by mouth daily, Disp-90 tablet, R-1, E-Prescribe      venlafaxine (EFFEXOR XR) 75 MG 24 hr capsule Take 75 mg by mouth every morning., Historical      !! acetaminophen (TYLENOL) 500 MG tablet Take 1,000 mg by mouth every 6 hours as needed for mild pain, Historical      LORazepam (ATIVAN) 1 MG tablet Take 1 tablet (1 mg) by mouth daily as needed for anxiety, Disp-20 tablet, R-0, Local Print       !! - Potential duplicate medications found. Please discuss with provider.            Discharge Procedure Orders   Reason for your hospital stay   Order Comments: Post op urinary retention and dural tear     Brief Discharge Instructions   Order Comments: Post-operative Discharge Instructions:     WOUND CARE:  You have a dressing on your incision which can be worn for up to 14 days, and it can be worn in the shower. After the dressing has been removed, you do not need a dressing. Do not be alarmed if your skin is discolored, the antibacterial properties of the dressing can cause discoloration of the skin.  There is \"surgical glue\" directly over the incision. This will fall off on its own, which can take up to 2 weeks. It is okay to shower and wash gently with soap and water. Do not soak in the bath. No pools, hot tubs, or lakes for 6 weeks.      After surgery, you may have a sensitive scar.  When the incision has fully healed, you may massage the scar to decrease sensitivity and help break down scar tissue. Do this up to 4 times per day.      DIET & EXERCISE:  - When you get home, you may resume your normal diet as tolerated. You may not be very hungry but " try to eat small healthy meals to help you heal. Remember to drink plenty of water/fluids to help keep you hydrated.     - You will be seen in the clinic at 6 weeks following surgery. You will not need to attend physical therapy during this time. You can focus on cardiovascular fitness by walking as much as you can tolerate. Avoid bending, lifting, and twisting. Your weight lifting restriction is 10 pounds until your first follow-up appointment.       PAIN MEDICATIONS:  For postoperative pain control, we have prescribed a variety of medications. Tylenol has been prescribed and should be taken as part of the complete pain management regimen. You may also have been prescribed a muscle relaxer to be taken as needed for muscle spasms. Other pain management techniques include icing the surgical area (for 15 minutes on, 15 minutes off) throughout the day to help with inflammation. Avoid NSAIDs (ibuprofen, Aleve, Advil, etc) for the first 12 weeks after surgery, unless approved by your surgeon.     You also received a prescription for a opioid medication.  This should be taken for the first few weeks following surgery.  As pain improves, decrease the amount you take (see tapering plan below). Opioid have numerous side effects including nausea, constipation, and drowsiness. If you experience nausea, this may be relieved by taking the medication with food or a light meal. To avoid constipation, please use an over-the-counter stool softeners and drink lots of water and eat fruits and vegetables. No operating heavy machinery or driving an automobile while on opioid medications.        Tapering opioids: As your pain symptoms improve, focus your efforts on decreasing (tapering) use of opioid medications. The most successful tapering strategy is to first, decrease the number of tablets you take every 4-6 hours to the minimum prescribed. Then, increase the amount of time between doses.  For example:  First, taper to   or 1 tablet  "every 4-6 hours.  Then, taper to   or 1 tablet every 6-8 hours.  Then, taper to   or 1 tablet every 8-10 hours.  Then, taper to   or 1 tablet every 10-12 hours.  Then, taper to   or 1 tablet at bedtime.  The bedtime dose can help with comfort during sleep and is typically the last dose to be discontinued after surgery.     Call the orthopedic clinic at [phone] several business days in advance of when you need a refill. Early refills will not be provided, and you may not take more than what is prescribed. Inform the nurse how much of the medication you are taking and how many tablets you have left.     WHEN TO CALL:  Please call or return if you experience the following:  - Fevers (temperature greater than 100.4 degrees Fahrenheit).  - Pain that is getting worse or does not respond to pain medications.  - Drainage from your wound.  - Increasing redness about the wound.  - Changes in strength or sensation to your arms/legs.   - Any other worrisome symptoms.     You may reach the clinic by dialing [phone].  After hours, you may reach the resident on call by dialing [phone].      When to call - Contact Surgeon Team   Order Comments: You may experience symptoms that require follow-up before your scheduled appointment. Refer to the \"Stoplight Tool\" for instructions on when to contact your Surgeon Team if you are concerned about pain control, blood clots, constipation, or if you are unable to urinate.     When to call - Reach out to Urgent Care   Order Comments: If you are not able to reach your Surgeon Team and you need immediate care, go to the Orthopedic Walk-in Clinic or Urgent Care at your Surgeon's office.  Do NOT go to the Emergency Room unless you have shortness of breath, chest pain, or other signs of a medical emergency.     When to call - Reasons to Call 911   Order Comments: Call 911 immediately if you experience sudden-onset chest pain, arm weakness/numbness, slurred speech, or shortness of " breath     Discharge Instruction - Breathing exercises   Order Comments: Perform breathing exercises using your Incentive Spirometer 10 times per hour while awake for 2 weeks.     Symptoms - Fever Management   Order Comments: A low grade fever can be expected after surgery.  Use acetaminophen (TYLENOL) as needed for fever management.  Contact your Surgeon Team if you have a fever greater than 101.5 F, chills, and/or night sweats.     Symptoms - Constipation management   Order Comments: Constipation (hard, dry bowel movements) is expected after surgery due to the combination of being less active, the anesthetic, and the opioid pain medication.  You can do the following to help reduce constipation:  ~  FLUIDS:  Drink clear liquids (water or Gatorade), or juice (apple/prune).  ~  DIET:  Eat a fiber rich diet.    ~  ACTIVITY:  Get up and move around several times a day.  Increase your activity as you are able.  MEDICATIONS:  Reduce the risk of constipation by starting medications before you are constipated.  You can take Miralax   (1 packet as directed) and/or a stool softener (Senokot 1-2 tablets 1-2 times a day).  If you already have constipation and these medications are not working, you can get magnesium citrate and use as directed.  If you continue to have constipation you can try an over the counter suppository or enema.  Call your Surgeon Team if it has been greater than 3 days since your last bowel movement.     Symptoms - Reduced Urine Output   Order Comments: Changes in the amount of fluids you drank before and after surgery may result in problems urinating.  It is important to stay well-hydrated after surgery and drink plenty of water. If it has been greater than 8 hours since you have urinated despite drinking plenty of water, call your Surgeon Team.     Activity - Exercises to prevent blood clots   Order Comments: Unless otherwise directed by your Surgeon team, perform the following exercises at least three  times per day for the first four weeks after surgery to prevent blood clots in your legs: 1) Point and flex your feet (Ankle Pumps), 2) Move your ankle around in big circles, 3) Wiggle your toes, 4) Walk, even for short distances, several times a day, will help decrease the risk of blood clots.     Order Specific Question Answer Comments   Is discharge order? Yes      Comfort and Pain Management - Pain after Surgery   Order Comments: Pain after surgery is normal and expected.  You will have some amount of pain for several weeks after surgery.  Your pain will improve with time.  There are several things you can do to help reduce your pain including: rest, ice, elevation, and using pain medications as needed. Contact your Surgeon Team if you have pain that persists or worsens after surgery despite rest, ice, elevation, and taking your medication(s) as prescribed. Contact your Surgeon Team if you have new numbness, tingling, or weakness in your operative extremity.     Comfort and Pain Management - Swelling after Surgery   Order Comments: Swelling and/or bruising of the surgical extremity is common and may persist for several months after surgery. In addition to frequent icing and elevation, gentle compressive support with an ACE wrap or tubigrip may help with swelling. Apply compression regularly, removing at least twice daily to perform skin checks. Contact your Surgeon Team if your swelling increases and is NOT associated with an increase in your activity level, or if your swelling increases and is associated with redness and pain.     Comfort and Pain Management - Cold therapy   Order Comments: Ice can be used to control swelling and discomfort after surgery. Place a thin towel over your operative site and apply the ice pack overtop. Leave ice pack in place for 20 minutes, then remove for 20 minutes. Repeat this 20 minutes on/20 minutes off routine as often as tolerated.     Medication Instructions - Acetaminophen  (TYLENOL) Instructions   Order Comments: You were discharged with acetaminophen (TYLENOL) for pain management after surgery. Acetaminophen most effectively manages pain symptoms when it is taken on a schedule without missing doses (every four, six, or eight hours). Your Provider will prescribe a safe daily dose between 3000 - 4000 mg.  Do NOT exceed this daily dose. Most patients use acetaminophen for pain control for the first four weeks after surgery.  You can wean from this medication as your pain decreases.     Medication Instructions - NSAID Instructions   Order Comments: Alternate Ibuprofen and Acetaminophen.     Medication Instructions - Muscle relaxant Medication Instructions   Order Comments: You were discharged with a muscle relaxer medication that can be used in conjunction with acetaminophen (TYLENOL) and the opioid medication to manage pain symptoms. Take the muscle relaxant medication exactly as directed.     Follow Up Care   Order Comments: Follow-up with your Surgeon Team in 6 weeks for wound check.     Opioid Instructions (Less than 65 years)   Order Comments: You were discharged with an opioid medication (hydromorphone, oxycodone, hydrocodone, or tramadol). This medication should only be taken for breakthrough pain that is not controlled with acetaminophen (TYLENOL). If you rate your pain less than 3 you do not need this medication. Pain rating 0-3: You do not need this medication. Pain rating 4-6: Take 1 tablet every 4-6 hours as needed Pain rating 7-10: Take 2 tablets every 4-6 hours as needed. Do not exceed 6 tablets per day     Medication Instructions - Opioids - Tapering Instructions   Order Comments: In the first three days following surgery, your symptoms may warrant use of the narcotic pain medication every four to six hours as prescribed. This is normal. As your pain symptoms improve, focus your efforts on decreasing (tapering) use of narcotic medications. The most successful tapering  strategy is to first, decrease the number of tablets you take every 4-6 hours to the minimum prescribed. Then, increase the amount of time between doses. For example: First, taper to   or 1 tablet every 4-6 hours. Then, taper to   or 1 tablet every 6-8 hours. Then, taper to   or 1 tablet every 8-10 hours. Then, taper to   or 1 tablet every 10-12 hours. Then, taper to   or 1 tablet at bedtime. The bedtime dose can help with comfort during sleep and is typically the last dose to be discontinued after surgery.     Orthotics, Mastectomy and Custom Compression Orders   Order Comments: Wear for comfort     Order Specific Question Answer Comments   Medical Equipment (DME) Supplier: Other (comments) [employer] or any home health equipment store (aka [facility])   PATIENT INSTRUCTIONS: Please contact your preferred vendor or insurance provider for assistance in obtaining the ordered medical equipment item.    Start Date: 11/8/2024    Needs Inpatient Orthotics/Prosthetics Consult for brace evaluation? No    Type: Orthotic    Orthotic Type Requested: LSO (Lumbosacral Orthosis)    LSO Custom or Off-the-Shelf: Off-the-Shelf (Available today)      Discharge Instruction - Regular Diet Adult   Order Comments: Return to your pre-surgery diet unless instructed otherwise     Order Specific Question Answer Comments   Is discharge order? Yes        Orthopedic surgery staff for this patient is Dr. Hebert. Discussed.        --  Otf Ewing MD  Spine Fellow

## 2024-11-14 ENCOUNTER — MYC MEDICAL ADVICE (OUTPATIENT)
Dept: ORTHOPEDICS | Facility: CLINIC | Age: 34
End: 2024-11-14
Payer: COMMERCIAL

## 2024-11-14 NOTE — TELEPHONE ENCOUNTER
See My chart message from pt.  DOS 11-7-24.  I called pt stated doing very well except hard to sleep last 4 nights.  States Sciatic pain is gone & only feels sore.  Only taking half a Dilaudid tab daily so has plenty left.  Taking Gabapentin from pain clinic.  Taking Tylenol & Robaxin & needs refills.  Having BMs & has plenty of stool soft.    Stated tried OTC Tylenol PM for sleep with no relief.  I advised trying OTC Benadryl or Lavendar & F/U with primary provider prn & pt agreed.  Call back prn. Lisa Dougherty RN.

## 2024-11-15 ENCOUNTER — DOCUMENTATION ONLY (OUTPATIENT)
Dept: ORTHOPEDICS | Facility: CLINIC | Age: 34
End: 2024-11-15
Payer: COMMERCIAL

## 2024-11-15 ENCOUNTER — TELEPHONE (OUTPATIENT)
Dept: ORTHOPEDICS | Facility: CLINIC | Age: 34
End: 2024-11-15
Payer: COMMERCIAL

## 2024-11-15 NOTE — TELEPHONE ENCOUNTER
Other: Pt is requesting that the FMLA for his wife be sent to his email, wasn't able to share information from [email or web address], so please send FMLA form to email at [email or web address]     Could we send this information to you in [place] or would you prefer to receive a phone call?:   Patient would prefer a phone call   Okay to leave a detailed message?: Yes at Cell number on file:    Telephone Information:   Mobile [phone]

## 2024-11-15 NOTE — PROGRESS NOTES
Received Completed forms Yes   Faxed Forms Faxed To: -  Fax Number: -   Sent to HIM (Date) 11/15/24

## 2024-11-15 NOTE — TELEPHONE ENCOUNTER
Form or Letter   Type or form/letter needing completion: [place] paperwork for his wife that is needed today. Please send to his [employer]. He said that he does not get a call he will continue to call until he gets one  Provider: Emilee  Date form needed: ASAP  Once completed: Mail form to Name: ChloéKACY, at Address: [employer]    Could we send this information to you in [employer] or would you prefer to receive a phone call?:   Patient would like to be contacted via [employer]

## 2024-11-15 NOTE — TELEPHONE ENCOUNTER
Action 11/15/24  2:15 PM KT   Action Taken  FMLA sent via email to [email or web address]

## 2024-11-20 NOTE — ANESTHESIA POSTPROCEDURE EVALUATION
Patient: Rogelio Noel    Procedure: Procedure(s):  LEFT LUMBAR 5-SACRAL 1 MICRODISCECTOMY, SPINE, repair of durotomy.       Anesthesia Type:  General    Note:  Disposition: Outpatient   Postop Pain Control: Uneventful            Sign Out: Well controlled pain   PONV: No   Neuro/Psych: Uneventful            Sign Out: Acceptable/Baseline neuro status   Airway/Respiratory: Uneventful            Sign Out: Acceptable/Baseline resp. status   CV/Hemodynamics: Uneventful            Sign Out: Acceptable CV status; No obvious hypovolemia; No obvious fluid overload   Other NRE: NONE   DID A NON-ROUTINE EVENT OCCUR? No           Last vitals:  Vitals Value Taken Time   /70 11/07/24 2115   Temp 36.4  C (97.5  F) 11/07/24 2100   Pulse 82 11/07/24 2115   Resp 17 11/07/24 2115   SpO2 96 % 11/07/24 2115       Electronically Signed By: Tasha Barney MD  November 20, 2024  11:29 AM

## 2024-12-03 DIAGNOSIS — I10 BENIGN ESSENTIAL HYPERTENSION: ICD-10-CM

## 2024-12-03 RX ORDER — LISINOPRIL 20 MG/1
20 TABLET ORAL DAILY
Qty: 90 TABLET | Refills: 3 | Status: SHIPPED | OUTPATIENT
Start: 2024-12-03

## 2024-12-16 ENCOUNTER — TELEPHONE (OUTPATIENT)
Dept: ORTHOPEDICS | Facility: CLINIC | Age: 34
End: 2024-12-16

## 2024-12-16 ENCOUNTER — VIRTUAL VISIT (OUTPATIENT)
Dept: ORTHOPEDICS | Facility: CLINIC | Age: 34
End: 2024-12-16
Payer: COMMERCIAL

## 2024-12-16 DIAGNOSIS — M51.16 LUMBAR DISC HERNIATION WITH RADICULOPATHY: Primary | ICD-10-CM

## 2024-12-16 DIAGNOSIS — Z98.890 H/O LUMBAR DISCECTOMY: ICD-10-CM

## 2024-12-16 PROCEDURE — 99024 POSTOP FOLLOW-UP VISIT: CPT | Mod: 95 | Performed by: ORTHOPAEDIC SURGERY

## 2024-12-16 NOTE — LETTER
12/16/2024      Rogelio Noel  [id number] [facility] [address]      Dear Colleague,    Thank you for referring your patient, Rogelio Noel, to the [facility]. Please see a copy of my visit note below.    Chief concern:  6-week postoperative follow-up    History of present illness:  Rogelio Noel returns today now 6 weeks s/p l5-S1 microdiscectomy.  Patient reports improvement in pain, strength and activity tolerance.  Denies any new focal strength or sensory deficits.  No bowel or bladder dysfunction    Objective via video visit:   Examination patient is alert and oriented with no acute distress  Nonlabored respiration with no audible wheeze  Patient is able to stand and walk without assistive device      Impression plan:  6-week status post above surgery.  Doing well overall.  We will plan to gradually advance lifting restrictions to 25 pounds as tolerated.  May begin increasing daily flexion extension rotation.  We discussed resuming home exercise program to focus on neutral spine stabilization and strengthening exercises, gait training.  Recommended and discussed birddog and dead bug exercises.  Would like to see patient back in clinic in 6 weeks with repeat radiographs.        Will provide note for return to work with restrictions.     Onel Hebert MD  Orthopedic Spine Surgeon  , Department of Orthopedic Surgery        Again, thank you for allowing me to participate in the care of your patient.        Sincerely,        Onel Hebert MD

## 2024-12-16 NOTE — PROGRESS NOTES
Chief concern:  6-week postoperative follow-up    History of present illness:  Rogelio Noel returns today now 6 weeks s/p l5-S1 microdiscectomy.  Patient reports improvement in pain, strength and activity tolerance.  Denies any new focal strength or sensory deficits.  No bowel or bladder dysfunction    Objective via video visit:   Examination patient is alert and oriented with no acute distress  Nonlabored respiration with no audible wheeze  Patient is able to stand and walk without assistive device      Impression plan:  6-week status post above surgery.  Doing well overall.  We will plan to gradually advance lifting restrictions to 25 pounds as tolerated.  May begin increasing daily flexion extension rotation.  We discussed resuming home exercise program to focus on neutral spine stabilization and strengthening exercises, gait training.  Recommended and discussed birddog and dead bug exercises.  Would like to see patient back in clinic in 6 weeks with repeat radiographs.        Will provide note for return to work with restrictions.     Onel Hebert MD  Orthopedic Spine Surgeon  , Department of Orthopedic Surgery

## 2024-12-16 NOTE — TELEPHONE ENCOUNTER
Writer called and left pt a voice message to change today's apt to video or phone visit per pt request. Pt to call clinic back to confirm as provider will not be in clinic in person.     Magy Mehta LPN

## 2024-12-16 NOTE — TELEPHONE ENCOUNTER
Writer called and scheduled a 6 week follow up appointment with Dr. Hebert. Dr. Hebert placed the work restriction letter, [facility] If not pt can let clinic know where to fax work letter to.     Magy Mehta LPN

## 2024-12-16 NOTE — NURSING NOTE
Reason For Visit:   Chief Complaint   Patient presents with    RECHECK      DOS 11/7/24 // LEFT L5-S1 MICRODISCECTOMY, SPINE, LUMBAR, 1 LEVEL, POSTERIOR APPROACH       Primary MD: Mitesh Bai  Ref. MD: EST       ?  No  Occupation [employer].  Currently working? Yes.  Work status?  Full time.     DOS: 11.7.24 - LEFT LUMBAR 5-SACRAL 1 MICRODISCECTOMY, SPINE     Smoker: No    There were no vitals taken for this visit.    Pain Assessment  Patient Currently in Pain: Yes  Patient's Stated Pain Goal: No pain  0-10 Pain Scale: 0  Primary Pain Location: Back    Oswestry (FLETCHER) Questionnaire        10/21/2024     8:02 AM   OSWESTRY DISABILITY INDEX   Count 9   Sum 16   Oswestry Score (%) 35.56 %                  Visual Analog Pain Scale  Back Pain Scale 0-10: 0  Right leg pain: 0  Left leg pain: 0  Neck Pain Scale 0-10: 0  Right arm pain: 0  Left arm pain: 0    Promis 10 Assessment         No data to display                         Ailyn Edwards, ATC

## 2024-12-16 NOTE — LETTER
Rogelio Noel     1990  Encounter date/time:  12/16/2024   [id number]    Report of Workability    1990  Physician:  Onel Hebert MD    Surgery Information:  Left L5-S1 microdiscectomy, dural repair  Date of Surgery:  11/7/2024  Diagnosis:  lumbar discectomy    Limitations:  Lift/Carry maximun: 25 lbs  Push/Pull maximum:  25 lbs  Allowed to sit:  45  minutes/hour  May stand/walk:  45 minutes/hour  May work up to:  8 hours/day    Other Restrictions:  Squat/kneel: Max up to 3x/hour  Twist/turn: Max up to 3x/hour  Reach above shoulders: Max up to 5x/hour  Bend or reach below knees: Max up to 5x/hour  Comments:    Return to work status: Return to work WITH limitations on 12/18/2024.    Follow-Up:   Return to clinic in 6 weeks.      Onel Hebert MD  , Department of Orthopedic Surgery  [facility]

## 2025-01-15 DIAGNOSIS — Z98.1 S/P LUMBAR SPINAL FUSION: Primary | ICD-10-CM

## 2025-02-03 ENCOUNTER — ANCILLARY PROCEDURE (OUTPATIENT)
Dept: GENERAL RADIOLOGY | Facility: CLINIC | Age: 35
End: 2025-02-03
Attending: ORTHOPAEDIC SURGERY
Payer: COMMERCIAL

## 2025-02-03 ENCOUNTER — OFFICE VISIT (OUTPATIENT)
Dept: ORTHOPEDICS | Facility: CLINIC | Age: 35
End: 2025-02-03
Payer: COMMERCIAL

## 2025-02-03 DIAGNOSIS — Z98.1 S/P LUMBAR SPINAL FUSION: ICD-10-CM

## 2025-02-03 DIAGNOSIS — Z98.890 H/O LUMBAR DISCECTOMY: Primary | ICD-10-CM

## 2025-02-03 PROCEDURE — 99024 POSTOP FOLLOW-UP VISIT: CPT | Performed by: ORTHOPAEDIC SURGERY

## 2025-02-03 PROCEDURE — 72100 X-RAY EXAM L-S SPINE 2/3 VWS: CPT | Mod: TC | Performed by: RADIOLOGY

## 2025-02-03 ASSESSMENT — PAIN SCALES - GENERAL: PAINLEVEL_OUTOF10: MILD PAIN (1)

## 2025-02-03 NOTE — LETTER
Rogelio Noel     1990  Encounter date/time:  2/3/2025   [id number]    Rogelio returned to clinic today for re-evaluation following his recent lumbar microdiscectomy.  At this time he has no work restrictions.      Follow-Up:   We will follow up as needed going forward.       Onel Hebert MD  , Department of Orthopedic Surgery  [facility]      
patellar Achilles tendon    AP and lateral views of the lumbar spine show previous hemilaminectomy. No spondylolisthesis or unexpected changes.     Impression plan:  3-months status post above surgery.  Doing great at this point. Provided work letter that he can return without restrictions. Follow up as needed.     Onel Hebert MD  Orthopedic Spine Surgeon  , Department of Orthopedic Surgery        Again, thank you for allowing me to participate in the care of your patient.        Sincerely,        Onel Hebert MD    Electronically signed

## 2025-02-03 NOTE — Clinical Note
2/3/2025      Rogelio Noel  [address] [facility] [id number]      Dear Colleague,    Thank you for referring your patient, Rogelio Noel, to the [facility]. Please see a copy of my visit note below.    Reason For Visit:   Chief Complaint   Patient presents with    RECHECK     3 months s/p LEFT LUMBAR 5-SACRAL 1 MICRODISCECTOMY, SPINE       Primary MD: Mitesh Bai  Ref. MD: EST    ?  No  Occupation [employer].  Currently working? Yes.  Work status?  Full time.     Smoker: No    There were no vitals taken for this visit.         Oswestry (FLETCHER) Questionnaire        2/3/2025     3:40 PM   OSWESTRY DISABILITY INDEX   Count 10    Sum 5    Oswestry Score (%) 10 %        Patient-reported            Neck Disability Index (NDI) Questionnaire         No data to display                            Promis 10 Assessment         No data to display                         Ailyn Edwards, [facility]        Again, thank you for allowing me to participate in the care of your patient.        Sincerely,        Onel Hebert MD    Electronically signed

## 2025-02-03 NOTE — PROGRESS NOTES
Reason For Visit:   Chief Complaint   Patient presents with    RECHECK     3 months s/p LEFT LUMBAR 5-SACRAL 1 MICRODISCECTOMY, SPINE       Primary MD: Mitesh Bai  Ref. MD: EST    ?  No  Occupation [employer].  Currently working? Yes.  Work status?  Full time.     Smoker: No    There were no vitals taken for this visit.         Oswestry (FLETCHER) Questionnaire        2/3/2025     3:40 PM   OSWESTRY DISABILITY INDEX   Count 10    Sum 5    Oswestry Score (%) 10 %        Patient-reported            Neck Disability Index (NDI) Questionnaire         No data to display                            Promis 10 Assessment         No data to display                         Ailyn Edwards, ATC

## 2025-02-08 NOTE — PROGRESS NOTES
Chief concern:  3-month lumbar spine surgery postoperative follow-up    History of present illness:  Rogelio Noel returns today now 3 months s/p L5-S1 microdiscectomy on 11/7/2024.  Patient has been performing physical therapy rehabilitation exercises since 6-week postoperative follow up visit. Has been able to return to work. Patient reports continued improvement in pain, strength and activity tolerance.  Denies any new focal strength or sensory deficits.  No bowel or bladder dysfunction    Objective:  Examination patient is alert and oriented with no acute distress  Nonlabored respiration with no audible wheeze  Examination of the surgical incision reveals well-healed surgical incision without surrounding erythema or fluctuance  Patient is able to stand and walk without assistive device  Resisted strength testing reveals 5/5 strength throughout the lower extremities  Sensations intact light light touch in L3-S1 distributions  There is no pathologic reflexes for patellar Achilles tendon    AP and lateral views of the lumbar spine show previous hemilaminectomy. No spondylolisthesis or unexpected changes.     Impression plan:  3-months status post above surgery.  Doing great at this point. Provided work letter that he can return without restrictions. Follow up as needed.     Onel Hebert MD  Orthopedic Spine Surgeon  , Department of Orthopedic Surgery

## 2025-02-27 ENCOUNTER — VIRTUAL VISIT (OUTPATIENT)
Dept: URGENT CARE | Facility: CLINIC | Age: 35
End: 2025-02-27
Payer: COMMERCIAL

## 2025-02-27 DIAGNOSIS — J10.1 INFLUENZA A: Primary | ICD-10-CM

## 2025-02-27 RX ORDER — OSELTAMIVIR PHOSPHATE 75 MG/1
75 CAPSULE ORAL 2 TIMES DAILY
Qty: 10 CAPSULE | Refills: 0 | Status: SHIPPED | OUTPATIENT
Start: 2025-02-27 | End: 2025-03-04

## 2025-02-27 NOTE — PROGRESS NOTES
Rogelio is a 35 year old male who presents for a billable video visit.    ASSESSMENT/PLAN:  Diagnoses and all orders for this visit:    Influenza A  -     oseltamivir (TAMIFLU) 75 MG capsule; Take 1 capsule (75 mg) by mouth 2 times daily for 5 days.    Pt presents with flu like illness x past 36 hours. With the amount of community flu currently, feel this is most likely the flu. Will treat with tamiflu here today, discussed side effects of tamiflu along with when to seek in person treatment. Pt verbalized understanding.     SUBJECTIVE: Pt reports 36 hours of fever, chills, congestion, body aches and fatigue. Denies shortness of breath.       ROS: Pertinent ROS neg other than the symptoms noted above in the HPI.     OBJECTIVE:  Vitals not done due to this being a virtual visit    GENERAL: healthy, alert and no distress  EYES: Eyes grossly normal to inspection,conjunctivae and sclerae normal  RESP: Able to speak in complete sentences, no audible wheeze or cough  SKIN: no suspicious lesions or rashes  NEURO: mentation intact and speech normal  PSYCH: mentation appears normal, affect normal/bright    Video-Visit Details    Type of service:  Video Visit  Video Start Time: 0802  Video End Time: 0808    Originating Location: Home    Distant Location:  [facility] VIRTUAL URGENT CARE     Platform used for Video Visit: JM Monroy CNP

## 2025-02-27 NOTE — LETTER
February 27, 2025      Rogelio Noel  [address] [facility] [address]        To Whom It May Concern:    Rogelio Noel  was seen on 2/27/2025.  Please excuse him until 24 hours fever free and symptoms improving due to illness.        Sincerely,        [facility]    Electronically signed

## 2025-03-01 ENCOUNTER — E-VISIT (OUTPATIENT)
Dept: URGENT CARE | Facility: CLINIC | Age: 35
End: 2025-03-01
Payer: COMMERCIAL

## 2025-03-01 DIAGNOSIS — B30.9 VIRAL CONJUNCTIVITIS: Primary | ICD-10-CM

## 2025-03-02 ENCOUNTER — OFFICE VISIT (OUTPATIENT)
Dept: URGENT CARE | Facility: URGENT CARE | Age: 35
End: 2025-03-02
Payer: COMMERCIAL

## 2025-03-02 VITALS
WEIGHT: 303 LBS | SYSTOLIC BLOOD PRESSURE: 130 MMHG | RESPIRATION RATE: 16 BRPM | DIASTOLIC BLOOD PRESSURE: 90 MMHG | TEMPERATURE: 97.6 F | BODY MASS INDEX: 37.37 KG/M2 | OXYGEN SATURATION: 98 % | HEART RATE: 97 BPM

## 2025-03-02 DIAGNOSIS — H10.33 ACUTE BACTERIAL CONJUNCTIVITIS OF BOTH EYES: Primary | ICD-10-CM

## 2025-03-02 PROCEDURE — 99213 OFFICE O/P EST LOW 20 MIN: CPT | Performed by: EMERGENCY MEDICINE

## 2025-03-02 RX ORDER — POLYMYXIN B SULFATE AND TRIMETHOPRIM 1; 10000 MG/ML; [USP'U]/ML
1-2 SOLUTION OPHTHALMIC EVERY 4 HOURS
Qty: 10 ML | Refills: 0 | Status: SHIPPED | OUTPATIENT
Start: 2025-03-02 | End: 2025-03-09

## 2025-03-02 NOTE — PATIENT INSTRUCTIONS
"Thank you for choosing us for your care. Based on your symptoms and length of illness, I do not think that you need an antibiotic prescription at this time.  Please follow the care advise I've provided below.    Viral pink eye can cause redness, irritation and itching in your eye as well as tearing and intermediate thickened discharge. Your eyes may even be stuck shut when when you wake up in the morning. Your eyelids may also become swollen. You'll be contagious while you have tearing and crusting in your eyes, generally 7-10 days. Wash your hands frequently and avoid touching your eyes to prevent spreading to others. You may use over the counter lubricating eye drops to help ease your symptoms. Allergy eye drops such as Patanol (olopatadine) or Zaditor (ketotifen) will help to control the itching. Avoid redness reducing eye drops for an extended period of time as these can cause a rebound and make the redness and irritation return when you stop using the drops. Cool packs on your eyes can help with irritation and swelling.       Pinkeye From a Virus in Children/Adult: Care Instructions  Overview     Pinkeye is a problem that many people get. In pinkeye, the lining of the eyelid and the eye surface become red and swollen. The lining is called the conjunctiva (say \"jpdt-bjrr-RP-vuh\"). Pinkeye is also called conjunctivitis (say \"bps-AHQB-iml-VY-tus\").  Pinkeye can be caused by bacteria, a virus, or an allergy.  Your pinkeye is caused by a virus. This type of pinkeye can spread quickly from person to person, usually from touching.  Pinkeye caused by a virus usually gets better on its own in 7 to 10 days. But it can last longer. Antibiotics do not help this type of pinkeye.  Follow-up care is a key part of your treatment and safety. Be sure to make and go to all appointments, and call your doctor if you are  having problems. It's also a good idea to know your test results and keep a list of the medicines your child " takes.  How can you care for your child at home?  Make yourself comfortable   Use moist cotton or a clean, wet cloth to remove the crust from your eyes. Wipe from the inside corner of the eye to the outside. Use a clean part of the cloth for each wipe.  Put cold or warm wet cloths on your eyes a few times a day if the eyes hurt or are itching.  Do not have wear contact lenses until the pinkeye is gone. Clean the contacts and storage case.  If you wear disposable contacts, get out a new pair when the eyes have cleared and it is safe to wear contacts again.  Prevent pinkeye from spreading   Wash your hands often. Always wash them before and after you treat pinkeye or touch your eyes or face.  Do not share towels, pillows, or clothes. Use clean linens, towels, and washcloths each day.  Do not share contact lens equipment, containers, or solutions.  When should you call for help?   Call your doctor now or seek immediate medical care if:    You have pain in an eye, not just irritation on the surface.     Change in vision or a loss of vision.     Pinkeye lasts longer than 7 days.         If you're not feeling better within  2-3days, please respond to this message and we can consider if an antibiotic prescription is needed.  You can schedule an appointment right here in [facility], or call [phone]  If the visit is for the same symptoms as your eVisit, we'll refund the cost of your eVisit if seen within seven days

## 2025-03-02 NOTE — PROGRESS NOTES
CHIEF COMPLAINT: Possible eye infection.      HPI: Patient is a 35-year-old male who over the last 24 hours is developed crusty exudate in both eyes.  He does wear contacts but has not worn them for a number of weeks.  No chronic eye disease other than vision correction.  Patient is currently on day 5 of influenza.      ROS: See HPI otherwise negative.  She is    No Known Allergies   Current Outpatient Medications   Medication Sig Dispense Refill    acetaminophen (TYLENOL) 325 MG tablet Take 2 tablets (650 mg) by mouth every 8 hours as needed for mild pain. 20 tablet 0    acetaminophen (TYLENOL) 500 MG tablet Take 1,000 mg by mouth every 6 hours as needed for mild pain      gabapentin (NEURONTIN) 300 MG capsule Take 1 capsule (300 mg) by mouth 3 times daily. 270 capsule 0    HYDROmorphone (DILAUDID) 2 MG tablet Take 1-2 tablets (2-4 mg) by mouth every 4 hours as needed for moderate to severe pain. 12 tablet 0    ibuprofen (ADVIL/MOTRIN) 400 MG tablet Take 800 mg by mouth every 6 hours as needed for moderate pain Usually takes two.      lisinopril (ZESTRIL) 20 MG tablet TAKE 1 TABLET (20 MG) BY MOUTH DAILY 90 tablet 3    LORazepam (ATIVAN) 1 MG tablet Take 1 tablet (1 mg) by mouth daily as needed for anxiety 20 tablet 0    methocarbamol (ROBAXIN) 500 MG tablet Take 1 tablet (500 mg) by mouth 4 times daily as needed for muscle spasms. 15 tablet 0    oseltamivir (TAMIFLU) 75 MG capsule Take 1 capsule (75 mg) by mouth 2 times daily for 5 days. 10 capsule 0    polyethylene glycol (MIRALAX) 17 GM/Dose powder Take 17 g by mouth daily.      polymixin b-trimethoprim (POLYTRIM) 88922-4.1 UNIT/ML-% ophthalmic solution Place 1-2 drops into both eyes every 4 hours for 7 days. 10 mL 0    senna-docusate (SENOKOT-S/PERICOLACE) 8.6-50 MG tablet Take 1 tablet by mouth at bedtime.      venlafaxine (EFFEXOR XR) 75 MG 24 hr capsule Take 75 mg by mouth every morning.           PE: No acute distress.  Vital signs normal.  Eyes reveal mild  injection of both eyes.  Lid margins are slightly erythematous.  No cellulitic changes about the eyes.        TREATMENT: None.      ASSESSMENT: Conjunctivitis viral versus bacterial.  Patient is in favor of more aggressive treatment to ensure bacterial infection is covered      DIAGNOSIS: Conjunctivitis both eyes      PLAN: Polytrim drops.  Follow-up 1 week if symptoms persist, sooner if worse

## 2025-03-04 ENCOUNTER — VIRTUAL VISIT (OUTPATIENT)
Dept: FAMILY MEDICINE | Facility: CLINIC | Age: 35
End: 2025-03-04
Payer: COMMERCIAL

## 2025-03-04 DIAGNOSIS — J01.10 ACUTE FRONTAL SINUSITIS, RECURRENCE NOT SPECIFIED: Primary | ICD-10-CM

## 2025-03-04 PROCEDURE — 98005 SYNCH AUDIO-VIDEO EST LOW 20: CPT | Performed by: NURSE PRACTITIONER

## 2025-03-04 NOTE — NURSING NOTE
"Chief Complaint   Patient presents with    URI    ER F/U       Initial There were no vitals taken for this visit. Estimated body mass index is 37.37 kg/m  as calculated from the following:    Height as of 11/7/24: 1.918 m (6' 3.5\").    Weight as of 3/2/25: 137.4 kg (303 lb).    Patient presents to the clinic using No DME    Is there anyone who you would like to be able to receive your results? No  If yes have patient fill out BILLY      "

## 2025-03-04 NOTE — PATIENT INSTRUCTIONS
Acute frontal sinusitis, recurrence not specified  Patient following up today for a 1 week history of upper respiratory symptoms including nasal congestion, postnasal drainage, headache, cough due to drainage and chills/sweats.  Patient seen last week and presumed influenza A was treated with Tamiflu, however symptoms are not better.  Patient still having chills and sweats with the above symptoms.  Patient has also developed conjunctivitis, treated via virtual visit previously.  Symptoms less likely influenza related as they continue, discussed other viral processes with patient as well as possibility of turning into sinus infection.  Patient only taking over-the-counter acetaminophen.  Recommend conservative management as below and if symptoms are not improving over the next 3 to 4 days can start antibiotic.  Start over-the-counter Flonase nasal spray, 2 sprays each nostril daily.  Reviewed proper administration instructions with patient.  Also start over-the-counter Mucinex 1200 mg twice daily  Ensure plenty of fluids, rest, elevating head of bed, use of a coolmist vaporizer and ibuprofen as needed.  - amoxicillin-clavulanate (AUGMENTIN) 875-125 MG tablet; Take 1 tablet by mouth 2 times daily for 7 days.

## 2025-03-04 NOTE — PROGRESS NOTES
"Rogelio is a 35 year old who is being evaluated via a billable video visit.    How would you like to obtain your AVS? MyChart  If the video visit is dropped, the invitation should be resent by: Text to cell phone: [phone]  Will anyone else be joining your video visit? No      Assessment & Plan     Acute frontal sinusitis, recurrence not specified  Patient following up today for a 1 week history of upper respiratory symptoms including nasal congestion, postnasal drainage, headache, cough due to drainage and chills/sweats.  Patient seen last week and presumed influenza A was treated with Tamiflu, however symptoms are not better.  Patient still having chills and sweats with the above symptoms.  Patient has also developed conjunctivitis, treated via virtual visit previously.  Symptoms less likely influenza related as they continue, discussed other viral processes with patient as well as possibility of turning into sinus infection.  Patient only taking over-the-counter acetaminophen.  Recommend conservative management as below and if symptoms are not improving over the next 3 to 4 days can start antibiotic.  Start over-the-counter Flonase nasal spray, 2 sprays each nostril daily.  Reviewed proper administration instructions with patient.  Also start over-the-counter Mucinex 1200 mg twice daily  Ensure plenty of fluids, rest, elevating head of bed, use of a coolmist vaporizer and ibuprofen as needed.  - amoxicillin-clavulanate (AUGMENTIN) 875-125 MG tablet; Take 1 tablet by mouth 2 times daily for 7 days.        MED REC REQUIRED  Post Medication Reconciliation Status: discharge medications reconciled and changed, per note/orders  BMI  Estimated body mass index is 37.37 kg/m  as calculated from the following:    Height as of 11/7/24: 1.918 m (6' 3.5\").    Weight as of 3/2/25: 137.4 kg (303 lb).   Weight management plan: Patient was referred to their PCP to discuss a diet and exercise plan.      See Patient " Ian Mercado is a 35 year old, presenting for the following health issues:  No chief complaint on file.        3/4/2025     9:58 AM   Additional Questions   Roomed by Tika CHEATHAM([facility])     HPI      Needs a doctors note - havent been at work since Wednesday last week     Pt had 3 Virtual visits  between 2/27/25 and 3/2/25  On 2/27/25 see details below  On 3/1/25 - E-Visit that turned to UC Virtual Visit which was related to dx of : Acute bacterial conjunctivitis of both eyes - was rx polymyxin-B-Trimethroprim drops every 4 hrs for both eyes     ED/UC Followup:    Facility:  [facility]  Date of visit:  02/27/25  Reason for visit: Influenza A   Current Status:         Acute Illness  Acute illness concerns: Influenza A    Onset/Duration: Follow up UC - Virtual visit from 2/27/25  Symptoms:  Fever: not in the past a few days   Chills/Sweats: YES  Headache (location?): YES  Sinus Pressure: YES  Conjunctivitis:  No  Ear Pain: no  Rhinorrhea: YES  Congestion: YES  Sore Throat: YES  Cough: YES  Wheeze: No  Decreased Appetite: No  Nausea: No  Vomiting: No  Diarrhea: No  Dysuria/Freq.: No  Dysuria or Hematuria: No  Fatigue/Achiness: YES  Sick/Strep Exposure: No  Therapies tried and outcome: Tamiflu,     Can breath out of nose better, but overall not better   Having chills and sweats through the night     Review of Systems  Constitutional, HEENT, cardiovascular, pulmonary, gi and gu systems are negative, except as otherwise noted.      Objective           Vitals:  No vitals were obtained today due to virtual visit.    Physical Exam   GENERAL: alert, no distress, and fatigued  EYES: Eyes grossly normal to inspection.  No discharge or erythema, or obvious scleral/conjunctival abnormalities.  HENT: Audible nasal congestion  RESP: No audible wheeze, cough, or visible cyanosis.    SKIN: Visible skin clear. No significant rash, abnormal pigmentation or lesions.  NEURO: Cranial nerves  grossly intact.  Mentation and speech appropriate for age.  PSYCH: Appropriate affect, tone, and pace of words    Diagnostic Test Results:  Labs reviewed in Epic  none        Video-Visit Details    Type of service:  Video Visit   Originating Location (pt. Location): Home    Distant Location (provider location):  Off-site  Platform used for Video Visit: [facility]  Signed Electronically by: Yudelka Campbell DNP    Chart documentation with Dragon Voice recognition Software. Although reviewed after completion, some words and grammatical errors may remain.

## 2025-03-04 NOTE — LETTER
March 4, 2025      Rogelio Noel  [address] [facility] [address]        To Whom It May Concern:    Rogelio Noel  was seen on 3/4/2025.  Please excuse him from 2/26/2025 - 3/6/2025 due to illness. He may return sooner if symptoms improving.        Sincerely,        Yudelka Campbell DNP    Electronically signed

## 2025-04-28 DIAGNOSIS — F41.9 ANXIETY: Primary | ICD-10-CM

## 2025-04-28 RX ORDER — VENLAFAXINE HYDROCHLORIDE 75 MG/1
75 CAPSULE, EXTENDED RELEASE ORAL DAILY
Qty: 30 CAPSULE | Refills: 0 | Status: SHIPPED | OUTPATIENT
Start: 2025-04-28

## 2025-05-19 ENCOUNTER — OFFICE VISIT (OUTPATIENT)
Dept: FAMILY MEDICINE | Facility: CLINIC | Age: 35
End: 2025-05-19
Payer: COMMERCIAL

## 2025-05-19 VITALS
RESPIRATION RATE: 16 BRPM | DIASTOLIC BLOOD PRESSURE: 88 MMHG | TEMPERATURE: 99 F | BODY MASS INDEX: 37.14 KG/M2 | HEIGHT: 76 IN | OXYGEN SATURATION: 98 % | WEIGHT: 305 LBS | HEART RATE: 86 BPM | SYSTOLIC BLOOD PRESSURE: 135 MMHG

## 2025-05-19 DIAGNOSIS — R73.09 ELEVATED GLUCOSE: ICD-10-CM

## 2025-05-19 DIAGNOSIS — E78.5 HYPERLIPIDEMIA LDL GOAL <100: Primary | ICD-10-CM

## 2025-05-19 DIAGNOSIS — F51.01 PRIMARY INSOMNIA: ICD-10-CM

## 2025-05-19 DIAGNOSIS — I10 BENIGN ESSENTIAL HYPERTENSION: ICD-10-CM

## 2025-05-19 DIAGNOSIS — F41.9 ANXIETY: ICD-10-CM

## 2025-05-19 PROBLEM — M54.16 LUMBAR RADICULOPATHY: Status: RESOLVED | Noted: 2024-10-21 | Resolved: 2025-05-19

## 2025-05-19 LAB
ANION GAP SERPL CALCULATED.3IONS-SCNC: 11 MMOL/L (ref 7–15)
BUN SERPL-MCNC: 22.5 MG/DL (ref 6–20)
CALCIUM SERPL-MCNC: 9.5 MG/DL (ref 8.8–10.4)
CHLORIDE SERPL-SCNC: 102 MMOL/L (ref 98–107)
CHOLEST SERPL-MCNC: 276 MG/DL
CREAT SERPL-MCNC: 1.04 MG/DL (ref 0.67–1.17)
EGFRCR SERPLBLD CKD-EPI 2021: >90 ML/MIN/1.73M2
EST. AVERAGE GLUCOSE BLD GHB EST-MCNC: 100 MG/DL
FASTING STATUS PATIENT QL REPORTED: NO
FASTING STATUS PATIENT QL REPORTED: NO
GLUCOSE SERPL-MCNC: 99 MG/DL (ref 70–99)
HBA1C MFR BLD: 5.1 % (ref 0–5.6)
HCO3 SERPL-SCNC: 27 MMOL/L (ref 22–29)
HDLC SERPL-MCNC: 41 MG/DL
LDLC SERPL CALC-MCNC: ABNORMAL MG/DL
NONHDLC SERPL-MCNC: 235 MG/DL
POTASSIUM SERPL-SCNC: 4.1 MMOL/L (ref 3.4–5.3)
SODIUM SERPL-SCNC: 140 MMOL/L (ref 135–145)
TRIGL SERPL-MCNC: 435 MG/DL

## 2025-05-19 PROCEDURE — 83721 ASSAY OF BLOOD LIPOPROTEIN: CPT | Mod: 59 | Performed by: FAMILY MEDICINE

## 2025-05-19 PROCEDURE — 83036 HEMOGLOBIN GLYCOSYLATED A1C: CPT | Performed by: FAMILY MEDICINE

## 2025-05-19 PROCEDURE — G2211 COMPLEX E/M VISIT ADD ON: HCPCS | Performed by: FAMILY MEDICINE

## 2025-05-19 PROCEDURE — 99214 OFFICE O/P EST MOD 30 MIN: CPT | Performed by: FAMILY MEDICINE

## 2025-05-19 PROCEDURE — 36415 COLL VENOUS BLD VENIPUNCTURE: CPT | Performed by: FAMILY MEDICINE

## 2025-05-19 PROCEDURE — 80061 LIPID PANEL: CPT | Performed by: FAMILY MEDICINE

## 2025-05-19 PROCEDURE — 80048 BASIC METABOLIC PNL TOTAL CA: CPT | Performed by: FAMILY MEDICINE

## 2025-05-19 RX ORDER — VENLAFAXINE HYDROCHLORIDE 75 MG/1
75 CAPSULE, EXTENDED RELEASE ORAL DAILY
Qty: 90 CAPSULE | Refills: 3 | Status: SHIPPED | OUTPATIENT
Start: 2025-05-19

## 2025-05-19 RX ORDER — LISINOPRIL 20 MG/1
20 TABLET ORAL DAILY
Qty: 90 TABLET | Refills: 3 | Status: SHIPPED | OUTPATIENT
Start: 2025-05-19

## 2025-05-19 RX ORDER — TRAZODONE HYDROCHLORIDE 50 MG/1
50 TABLET ORAL
Qty: 30 TABLET | Refills: 3 | Status: SHIPPED | OUTPATIENT
Start: 2025-05-19

## 2025-05-19 ASSESSMENT — PAIN SCALES - GENERAL: PAINLEVEL_OUTOF10: NO PAIN (0)

## 2025-05-19 NOTE — PROGRESS NOTES
"S :Rogelio LYLES Sadie is a 35 year old male with     Anxiety: better on venalafaxine.  Needs fills.      Hyperlipidemia: recheck    Htn: stable on lisinopril.  Fills and labs    Elevated glucose: recheck A1C    Insomnia: tried some tylenol PM, didn't work great.  Try something else?      Current Outpatient Medications   Medication Sig Dispense Refill    acetaminophen (TYLENOL) 325 MG tablet Take 2 tablets (650 mg) by mouth every 8 hours as needed for mild pain. 20 tablet 0    lisinopril (ZESTRIL) 20 MG tablet Take 1 tablet (20 mg) by mouth daily. 90 tablet 3    LORazepam (ATIVAN) 1 MG tablet Take 1 tablet (1 mg) by mouth daily as needed for anxiety 20 tablet 0    traZODone (DESYREL) 50 MG tablet Take 1 tablet (50 mg) by mouth nightly as needed for sleep. 30 tablet 3    venlafaxine (EFFEXOR XR) 75 MG 24 hr capsule Take 1 capsule (75 mg) by mouth daily. 90 capsule 3       O:/88   Pulse 86   Temp 99  F (37.2  C) (Tympanic)   Resp 16   Ht 1.918 m (6' 3.5\")   Wt (!) 138.3 kg (305 lb)   SpO2 98%   BMI 37.62 kg/m    GEN: Alert and oriented, in no acute distress  CV: RRR, no murmur  RESP: lungs clear bilaterally, good effort  EXT: no edema or lesions noted in lower extremities    A: Anxiety: better on venalafaxine.  Hyperlipidemia: recheck  Htn: stable on lisinopril.    Elevated glucose: recheck A1C  Insomnia: not controlled    P trazodone t rial.  Fills on other two meds as above  Labs ordered as appropriate for monitoring the listed medical conditions.    Continue medications for medical issues as above in med list and orders     The longitudinal plan of care for the diagnosis(es)/condition(s) as documented were addressed during this visit. Due to the added complexity in care, I will continue to support Rogelio in the subsequent management and with ongoing continuity of care.    "

## 2025-05-20 ENCOUNTER — RESULTS FOLLOW-UP (OUTPATIENT)
Dept: FAMILY MEDICINE | Facility: CLINIC | Age: 35
End: 2025-05-20

## 2025-05-20 LAB — LDLC SERPL DIRECT ASSAY-MCNC: 165 MG/DL

## 2025-07-13 ENCOUNTER — HEALTH MAINTENANCE LETTER (OUTPATIENT)
Age: 35
End: 2025-07-13

## (undated) DEVICE — BLADE CLIPPER DISP 4406

## (undated) DEVICE — DRAPE C-ARMOR 5 SIDED 5523

## (undated) DEVICE — SU MONOCRYL 3-0 PS-2 27" Y427H

## (undated) DEVICE — SU DERMABOND PRINEO 42CM CLR422US

## (undated) DEVICE — SU STRATAFIX PDS PLUS 0 CT-1 30CM SXPP1B450

## (undated) DEVICE — ENDO SNARE EXACTO COLD 9MM LOOP 2.4MMX230CM 00711115

## (undated) DEVICE — SOL WATER IRRIG 1000ML BOTTLE 2F7114

## (undated) DEVICE — Device

## (undated) DEVICE — LINEN BACK PACK 5440

## (undated) DEVICE — SU VICRYL 0 CT-1 CR 8X18" J740D

## (undated) DEVICE — GLOVE BIOGEL PI ULTRATOUCH G SZ 8.0 42180

## (undated) DEVICE — RX SURGIFLO HEMOSTATIC MATRIX W/THROMBIN 8ML 2994

## (undated) DEVICE — SUCTION TIP YANKAUER W/O VENT K86

## (undated) DEVICE — DRAPE LAP W/ARMBOARD 29410

## (undated) DEVICE — DRSG ACTICOAT 4 X 4.75" 66021770

## (undated) DEVICE — SUTURE VICRYL+ 2-0 CT-2 CR 8X18" VCP726D

## (undated) DEVICE — TUBING SUCTION MED-VAC 7MMX20' N720A

## (undated) DEVICE — SUCTION MANIFOLD NEPTUNE 2 SYS 4 PORT 0702-020-000

## (undated) DEVICE — PREP CHLORAPREP 26ML TINTED HI-LITE ORANGE 930815

## (undated) DEVICE — GLOVE BIOGEL PI MICRO INDICATOR UNDERGLOVE SZ 8.0 48980

## (undated) DEVICE — DRSG KERLIX 4 1/2"X4YDS ROLL 6730

## (undated) DEVICE — SOL ISOPROPYL RUBBING ALCOHOL USP 70% 4OZ HDX-20 I0020

## (undated) DEVICE — TOOL DISSECT MIDAS MR8 14CM MATCH HEAD 3MM MR8-14MH30

## (undated) DEVICE — SUTURE VICRYL+ 1 CT-1 CR 8X18" VIO VCP741D

## (undated) DEVICE — TUBING ARTHROSCOPY PUMP ARTHREX AR-6410

## (undated) DEVICE — SOL NACL 0.9% IRRIG 1000ML BOTTLE 2F7124

## (undated) DEVICE — DRAPE C-ARM W/STRAPS 42X72" 07-CA104

## (undated) DEVICE — DECANTER TRANSFER DEVICE 2008S

## (undated) RX ORDER — GABAPENTIN 300 MG/1
CAPSULE ORAL
Status: DISPENSED
Start: 2024-11-07

## (undated) RX ORDER — CALCIUM CHLORIDE 100 MG/ML
INJECTION INTRAVENOUS; INTRAVENTRICULAR
Status: DISPENSED
Start: 2024-11-07

## (undated) RX ORDER — HYDROMORPHONE HYDROCHLORIDE 1 MG/ML
INJECTION, SOLUTION INTRAMUSCULAR; INTRAVENOUS; SUBCUTANEOUS
Status: DISPENSED
Start: 2024-11-07

## (undated) RX ORDER — ACETAMINOPHEN 325 MG/1
TABLET ORAL
Status: DISPENSED
Start: 2024-11-07

## (undated) RX ORDER — REMIFENTANIL HYDROCHLORIDE 1 MG/ML
INJECTION, POWDER, LYOPHILIZED, FOR SOLUTION INTRAVENOUS
Status: DISPENSED
Start: 2024-11-07

## (undated) RX ORDER — ETOMIDATE 2 MG/ML
INJECTION INTRAVENOUS
Status: DISPENSED
Start: 2024-11-07

## (undated) RX ORDER — ONDANSETRON 2 MG/ML
INJECTION INTRAMUSCULAR; INTRAVENOUS
Status: DISPENSED
Start: 2024-11-07

## (undated) RX ORDER — DEXAMETHASONE SODIUM PHOSPHATE 10 MG/ML
INJECTION, SOLUTION INTRAMUSCULAR; INTRAVENOUS
Status: DISPENSED
Start: 2021-07-27

## (undated) RX ORDER — CELECOXIB 200 MG/1
CAPSULE ORAL
Status: DISPENSED
Start: 2024-11-07

## (undated) RX ORDER — DEXAMETHASONE SODIUM PHOSPHATE 4 MG/ML
INJECTION, SOLUTION INTRA-ARTICULAR; INTRALESIONAL; INTRAMUSCULAR; INTRAVENOUS; SOFT TISSUE
Status: DISPENSED
Start: 2024-11-07

## (undated) RX ORDER — LIDOCAINE HYDROCHLORIDE 10 MG/ML
INJECTION, SOLUTION EPIDURAL; INFILTRATION; INTRACAUDAL; PERINEURAL
Status: DISPENSED
Start: 2024-05-10

## (undated) RX ORDER — GLYCOPYRROLATE 0.2 MG/ML
INJECTION, SOLUTION INTRAMUSCULAR; INTRAVENOUS
Status: DISPENSED
Start: 2024-11-07

## (undated) RX ORDER — FENTANYL CITRATE 0.05 MG/ML
INJECTION, SOLUTION INTRAMUSCULAR; INTRAVENOUS
Status: DISPENSED
Start: 2024-11-07

## (undated) RX ORDER — FENTANYL CITRATE 50 UG/ML
INJECTION, SOLUTION INTRAMUSCULAR; INTRAVENOUS
Status: DISPENSED
Start: 2024-11-07

## (undated) RX ORDER — VANCOMYCIN HYDROCHLORIDE 1 G/20ML
INJECTION, POWDER, LYOPHILIZED, FOR SOLUTION INTRAVENOUS
Status: DISPENSED
Start: 2024-11-07

## (undated) RX ORDER — BUPIVACAINE HYDROCHLORIDE 2.5 MG/ML
INJECTION, SOLUTION EPIDURAL; INFILTRATION; INTRACAUDAL
Status: DISPENSED
Start: 2021-04-29

## (undated) RX ORDER — CEFAZOLIN SODIUM/WATER 3 G/30 ML
SYRINGE (ML) INTRAVENOUS
Status: DISPENSED
Start: 2024-11-07

## (undated) RX ORDER — PROPOFOL 10 MG/ML
INJECTION, EMULSION INTRAVENOUS
Status: DISPENSED
Start: 2024-11-07

## (undated) RX ORDER — DEXAMETHASONE SODIUM PHOSPHATE 10 MG/ML
INJECTION, SOLUTION INTRAMUSCULAR; INTRAVENOUS
Status: DISPENSED
Start: 2021-04-29

## (undated) RX ORDER — LIDOCAINE HYDROCHLORIDE AND EPINEPHRINE 5; 5 MG/ML; UG/ML
INJECTION, SOLUTION INFILTRATION; PERINEURAL
Status: DISPENSED
Start: 2024-11-07

## (undated) RX ORDER — FENTANYL CITRATE-0.9 % NACL/PF 10 MCG/ML
PLASTIC BAG, INJECTION (ML) INTRAVENOUS
Status: DISPENSED
Start: 2024-11-07

## (undated) RX ORDER — BUPIVACAINE HYDROCHLORIDE 2.5 MG/ML
INJECTION, SOLUTION EPIDURAL; INFILTRATION; INTRACAUDAL
Status: DISPENSED
Start: 2021-07-27

## (undated) RX ORDER — KETOROLAC TROMETHAMINE 30 MG/ML
INJECTION, SOLUTION INTRAMUSCULAR; INTRAVENOUS
Status: DISPENSED
Start: 2024-11-07